# Patient Record
Sex: MALE | Race: WHITE | NOT HISPANIC OR LATINO | Employment: FULL TIME
[De-identification: names, ages, dates, MRNs, and addresses within clinical notes are randomized per-mention and may not be internally consistent; named-entity substitution may affect disease eponyms.]

---

## 2017-08-12 ENCOUNTER — HEALTH MAINTENANCE LETTER (OUTPATIENT)
Age: 52
End: 2017-08-12

## 2017-09-05 ENCOUNTER — HOSPITAL ENCOUNTER (EMERGENCY)
Facility: CLINIC | Age: 52
Discharge: HOME | End: 2017-09-05
Attending: PHYSICIAN ASSISTANT | Admitting: PHYSICIAN ASSISTANT
Payer: COMMERCIAL

## 2017-09-05 VITALS — HEART RATE: 103 BPM | DIASTOLIC BLOOD PRESSURE: 95 MMHG | TEMPERATURE: 98.8 F | SYSTOLIC BLOOD PRESSURE: 137 MMHG

## 2017-09-05 DIAGNOSIS — R50.9 FEVER, UNSPECIFIED: ICD-10-CM

## 2017-09-05 DIAGNOSIS — K30 UPSET STOMACH: ICD-10-CM

## 2017-09-05 DIAGNOSIS — L98.9 SKIN LESION OF RIGHT LEG: ICD-10-CM

## 2017-09-05 LAB
INTERNAL QC OK POCT: YES
S PYO AG THROAT QL IA.RAPID: NEGATIVE

## 2017-09-05 PROCEDURE — 99213 OFFICE O/P EST LOW 20 MIN: CPT | Performed by: PHYSICIAN ASSISTANT

## 2017-09-05 PROCEDURE — 87880 STREP A ASSAY W/OPTIC: CPT | Performed by: PHYSICIAN ASSISTANT

## 2017-09-05 PROCEDURE — 87081 CULTURE SCREEN ONLY: CPT | Performed by: PHYSICIAN ASSISTANT

## 2017-09-05 PROCEDURE — 99213 OFFICE O/P EST LOW 20 MIN: CPT

## 2017-09-05 NOTE — ED AVS SNAPSHOT
[facility] Emergency Department    [address] [address]    Phone:  [phone]    Fax:  [phone]                                       Oscar Greenberg   MRN: [id number]    Department:  [facility] Emergency Department   Date of Visit:  9/5/2017           Patient Information     Date Of Birth          1965        Your diagnoses for this visit were:     Fever, unspecified     Upset stomach     Skin lesion of right leg        You were seen by Yamilet Lu PA-C.      Follow-up Information     Follow up with Rafa Mccoy MD In 2 days.    Specialty:  Family Practice    Contact information:    [address] [id number] [phone]          Discharge Instructions       Patient declined Emergency Room evaluation of fever.     Increase fluids, rest, tylenol/ibuprofen over the counter as needed.     Return if fevers > 104F or not controlled with fever reducing medications, abdominal pain, vomiting, diarrhea, dehydration, worsening headaches, rash, redness, warmth, red streaking up leg, or worsening/changing of overall symptoms.     Patient agrees and will return if symptoms fail to improve. Risks were discussed with patient     24 Hour Appointment Hotline       To make an appointment at any [facility], call [phone] ([phone]). If you don't have a family doctor or clinic, we will help you find one. [place] clinics are conveniently located to serve the needs of you and your family.             Review of your medicines      Our records show that you are taking the medicines listed below. If these are incorrect, please call your family doctor or clinic.        Dose / Directions Last dose taken    IBUPROFEN PO   Dose:  400 mg        Take 400 mg by mouth   Refills:  0                Procedures and tests performed during your visit     Beta strep group A r/o culture    Rapid strep group A screen POCT      Orders Needing  Specimen Collection     None      Pending Results     No orders found from 9/3/2017 to 9/6/2017.            Pending Culture Results     No orders found from 9/3/2017 to 9/6/2017.            Pending Results Instructions     If you had any lab results that were not finalized at the time of your Discharge, you can call the ED Lab Result RN at [phone]. You will be contacted by this team for any positive Lab results or changes in treatment. The nurses are available 7 days a week from 10A to 6:30P.  You can leave a message 24 hours per day and they will return your call.        Test Results From Your Hospital Stay        9/5/2017  9:26 PM      Component Results     Component Value Ref Range & Units Status    Rapid Strep A Screen negative neg Final    Internal QC OK Yes  Final                Thank you for choosing [facility]       Thank you for choosing [facility] for your care. Our goal is always to provide you with excellent care. Hearing back from our patients is one way we can continue to improve our services. Please take a few minutes to complete the written survey that you may receive in the mail after you visit with us. Thank you!        [employer] Information     [employer] gives you secure access to your electronic health record. If you see a primary care provider, you can also send messages to your care team and make appointments. If you have questions, please call your primary care clinic.  If you do not have a primary care provider, please call [phone] and they will assist you.        Care EveryWhere ID     This is your Care EveryWhere ID. This could be used by other organizations to access your [facility] medical records  [id number]        Equal Access to Services     NEREIDA SALCIDO : Maria Harris, karlee patel, qaybchela kaaldavid parmar. So [facility] [phone].    ATENCIÓN: Si habla español, tiene a jones disposición servicios gratuitos de asistencia  maryl ou Gomeznette al [phone].    We comply with applicable federal civil rights laws and [place] laws. We do not discriminate on the basis of race, color, national origin, age, disability sex, sexual orientation or gender identity.            After Visit Summary       This is your record. Keep this with you and show to your community pharmacist(s) and doctor(s) at your next visit.

## 2017-09-05 NOTE — ED AVS SNAPSHOT
[facility] Emergency Department    [address] [address]    Phone:  [phone]    Fax:  [phone]                                       Oscar Greenberg   MRN: [id number]    Department:  [facility] Emergency Department   Date of Visit:  9/5/2017           After Visit Summary Signature Page     I have received my discharge instructions, and my questions have been answered. I have discussed any challenges I see with this plan with the nurse or doctor.    ..........................................................................................................................................  Patient/Patient Representative Signature      ..........................................................................................................................................  Patient Representative Print Name and Relationship to Patient    ..................................................               ................................................  Date                                            Time    ..........................................................................................................................................  Reviewed by Signature/Title    ...................................................              ..............................................  Date                                                            Time

## 2017-09-06 NOTE — ED PROVIDER NOTES
History     Chief Complaint   Patient presents with     Tick Removal     right calf     HPI  Oscar Greenberg is a 52 year old male who presents with concerns for tick to the right mid calf. Patient states he noticed spot to right calf about 6-7 days ago and he returned from [facility] 6 days ago. Patient denies known tick, redness, warmth, swelling, tenderness, or drainage from area. Positive pin point blood blister noted to spot. Patient states ever since he has been home from [place] he has noticed body aches, upset stomach, nausea, on and off headaches, and fever of 100F today after take 1000mg of acetaminophen 1 hr prior. Patient states he had sepsis with unknown source in the past and significant bowel issues and surgeries last year. Patient denies chest pain, cough, shortness of breath, abdominal pain, urinary symptoms. Patient states he is concerned about a blood infection.     I have reviewed the Medications, Allergies, Past Medical and Surgical History, and Social History in the Epic system.    Review of Systems     All normal unless stated above     Physical Exam   BP: (!) 143/111  Pulse: 103  Temp: 98.8  F (37.1  C)  Physical Exam   Constitutional: He is oriented to person, place, and time. He appears well-developed and well-nourished. No distress.   HENT:   Right Ear: External ear normal.   Left Ear: External ear normal.   Mouth/Throat: Mucous membranes are normal. Posterior oropharyngeal erythema present. No oropharyngeal exudate, posterior oropharyngeal edema or tonsillar abscesses.   Neck: Normal range of motion. Neck supple. No tracheal deviation present. No thyromegaly present.   Cardiovascular: Normal rate, normal heart sounds and intact distal pulses.    No murmur heard.  Pulmonary/Chest: Effort normal and breath sounds normal. No stridor.   Abdominal: Soft. Bowel sounds are normal. He exhibits no distension and no mass. There is tenderness (mild throughout). There is no rebound and no  guarding.   Musculoskeletal: Normal range of motion. He exhibits no edema, tenderness or deformity.   Lymphadenopathy:     He has cervical adenopathy.   Neurological: He is alert and oriented to person, place, and time.   Skin: Skin is warm and dry.        2 mm lesion to the right mid calf with central blood blister noted. No tenderness, warmth, erythema, drainage, or red streaking noted. No tick present.    Psychiatric: He has a normal mood and affect. His behavior is normal. Judgment and thought content normal.     Rst: negative in clinic.       ED Course     ED Course     Procedures            Critical Care time:  none               Labs Ordered and Resulted from Time of ED Arrival Up to the Time of Departure from the ED   RAPID STREP GROUP A SCREEN POCT   BETA STREP GROUP A CULTURE       Assessments & Plan (with Medical Decision Making)     I have reviewed the nursing notes.    I have reviewed the findings, diagnosis, plan and need for follow up with the patient.  Informed patient that lesion on right calf at this time does not appear infectious or related to other symptoms, but if lesion persists patient to follow up with Dermatology.   Discussed with patient that I don't think the lesion on the leg is related to the rest of his symptoms and with his history and recent travel to [place] recommendation is for him to have further evaluation and work up for fever, GI upset, and body aches in the Emergency Room. Discussed with Dr. Patterson who agreed. Patient was declined further work up in Emergency Room at this time and states he will return if symptoms persist/change/fail to improve. Patient informed of all risks and is aware and understands.        Discharge Medication List as of 9/5/2017  9:35 PM          Final diagnoses:   Fever, unspecified   Upset stomach   Skin lesion of right leg       9/5/2017   [facility] EMERGENCY DEPARTMENT     Yamilet Lu PA-C  09/05/17 [id number]

## 2017-09-06 NOTE — DISCHARGE INSTRUCTIONS
Patient declined Emergency Room evaluation of fever.     Increase fluids, rest, tylenol/ibuprofen over the counter as needed.     Return if fevers > 104F or not controlled with fever reducing medications, abdominal pain, vomiting, diarrhea, dehydration, worsening headaches, rash, redness, warmth, red streaking up leg, or worsening/changing of overall symptoms.     Patient agrees and will return if symptoms fail to improve. Risks were discussed with patient

## 2017-09-08 LAB
BACTERIA SPEC CULT: NORMAL
Lab: NORMAL
SPECIMEN SOURCE: NORMAL

## 2017-10-16 ENCOUNTER — ALLIED HEALTH/NURSE VISIT (OUTPATIENT)
Dept: FAMILY MEDICINE | Facility: CLINIC | Age: 52
End: 2017-10-16
Payer: COMMERCIAL

## 2017-10-16 DIAGNOSIS — Z23 NEED FOR PROPHYLACTIC VACCINATION AND INOCULATION AGAINST INFLUENZA: Primary | ICD-10-CM

## 2017-10-16 PROCEDURE — 90471 IMMUNIZATION ADMIN: CPT

## 2017-10-16 PROCEDURE — 90686 IIV4 VACC NO PRSV 0.5 ML IM: CPT

## 2017-10-16 PROCEDURE — 99207 ZZC NO CHARGE NURSE ONLY: CPT

## 2017-10-16 NOTE — MR AVS SNAPSHOT
After Visit Summary   10/16/2017    Oscar Greenberg    MRN: [id number]           Patient Information     Date Of Birth          1965        Visit Information        Provider Department      10/16/2017 8:30 AM [place] FP/IM CMA/LPN [facility]        Today's Diagnoses     Need for prophylactic vaccination and inoculation against influenza    -  1       Follow-ups after your visit        Who to contact     If you have questions or need follow up information about today's clinic visit or your schedule please contact [facility] directly at [phone].  Normal or non-critical lab and imaging results will be communicated to you by Probiodrughart, letter or phone within 4 business days after the clinic has received the results. If you do not hear from us within 7 days, please contact the clinic through iOmandot or phone. If you have a critical or abnormal lab result, we will notify you by phone as soon as possible.  Submit refill requests through [employer] or call your pharmacy and they will forward the refill request to us. Please allow 3 business days for your refill to be completed.          Additional Information About Your Visit        MyChart Information     [employer] gives you secure access to your electronic health record. If you see a primary care provider, you can also send messages to your care team and make appointments. If you have questions, please call your primary care clinic.  If you do not have a primary care provider, please call [phone] and they will assist you.        Care EveryWhere ID     This is your Care EveryWhere ID. This could be used by other organizations to access your [facility] medical records  [id number]         Blood Pressure from Last 3 Encounters:   09/05/17 (!) 137/95   08/09/16 125/86   07/20/16 128/81    Weight from Last 3 Encounters:   08/09/16 265 lb (120.2 kg)   07/20/16 259 lb 0.7 oz (117.5 kg)   07/15/16 259 lb (117.5 kg)              We  Performed the Following     FLU VAC, SPLIT VIRUS IM > 3 YO (QUADRIVALENT) [80140]     Vaccine Administration, Initial [[id number]]        Primary Care Provider Office Phone # Fax #    Rafa Mccoy [phone] [address] [address]        Equal Access to Services     NEREIDA SALCIDO : Hadii aad ku hadasho Soomaali, waaxda luqadaha, qaybta kaalmada adeegyada, david sesay . So [facility] [phone].    ATENCIÓN: Si habla español, tiene a jones disposición servicios gratuitos de asistencia lingüística. Llame al [phone].    We comply with applicable federal civil rights laws and [place] laws. We do not discriminate on the basis of race, color, national origin, age, disability, sex, sexual orientation, or gender identity.            Thank you!     Thank you for choosing [facility]  for your care. Our goal is always to provide you with excellent care. Hearing back from our patients is one way we can continue to improve our services. Please take a few minutes to complete the written survey that you may receive in the mail after your visit with us. Thank you!             Your Updated Medication List - Protect others around you: Learn how to safely use, store and throw away your medicines at www.disposemymeds.org.          This list is accurate as of: 10/16/17  8:37 AM.  Always use your most recent med list.                   Brand Name Dispense Instructions for use Diagnosis    IBUPROFEN PO      Take 400 mg by mouth

## 2017-10-16 NOTE — PROGRESS NOTES
Injectable Influenza Immunization Documentation    1.  Is the person to be vaccinated sick today?   No    2. Does the person to be vaccinated have an allergy to a component   of the vaccine?   No    3. Has the person to be vaccinated ever had a serious reaction   to influenza vaccine in the past?   No    4. Has the person to be vaccinated ever had Guillain-Barré syndrome?   No    Form completed by Milena Gomez CMA  Per orders of Dr. Gifford, injection of flu vaccine given by Milena Gomez. Patient instructed to remain in clinic for 15 minutes afterwards, and to report any adverse reaction to me immediately.

## 2018-10-16 ENCOUNTER — TELEPHONE (OUTPATIENT)
Dept: FAMILY MEDICINE | Facility: CLINIC | Age: 53
End: 2018-10-16

## 2018-10-16 NOTE — TELEPHONE ENCOUNTER
Triaging patient for appointment on 10/18/18 for rectal bleeding  Patient reports:  6 years ago he had a colonoscopy and had non cancerous polyps removed  For the last several months, about a day each month, he has bright red blood after having a normal soft bowel movement when wiping, no clots or active bleeding present  No changes in diet  He believes his mother has crohn's disease  He has not seen gastroenterology  He has noticed increase in urgency to have a bowel movement over the last several months.  Bowel movement 8-10 times a day, plans days around being around bathroom.  Appointment with provider 10/18/18 to have provider evaluated these symptoms  Patient advised to keep appointment with provider    Dixie ALEMAN Rn

## 2018-10-16 NOTE — TELEPHONE ENCOUNTER
Received information from scheduling staff he has made an appt for later this week for rectal bleeding.     Attempted to contact patient to triage. Left message for patient to return call  ERYN WilsonN, RN

## 2018-10-18 ENCOUNTER — HOSPITAL ENCOUNTER (OUTPATIENT)
Facility: CLINIC | Age: 53
End: 2018-10-18
Attending: SURGERY | Admitting: SURGERY
Payer: COMMERCIAL

## 2018-10-18 ENCOUNTER — OFFICE VISIT (OUTPATIENT)
Dept: FAMILY MEDICINE | Facility: CLINIC | Age: 53
End: 2018-10-18
Payer: COMMERCIAL

## 2018-10-18 VITALS
RESPIRATION RATE: 14 BRPM | HEIGHT: 74 IN | WEIGHT: 283.8 LBS | DIASTOLIC BLOOD PRESSURE: 84 MMHG | BODY MASS INDEX: 36.42 KG/M2 | TEMPERATURE: 98.4 F | SYSTOLIC BLOOD PRESSURE: 130 MMHG | HEART RATE: 88 BPM | OXYGEN SATURATION: 94 %

## 2018-10-18 DIAGNOSIS — Z23 NEED FOR TDAP VACCINATION: ICD-10-CM

## 2018-10-18 DIAGNOSIS — Z12.11 SCREENING FOR MALIGNANT NEOPLASM OF COLON: ICD-10-CM

## 2018-10-18 DIAGNOSIS — R58 BLOOD ON TOILET PAPER: Primary | ICD-10-CM

## 2018-10-18 DIAGNOSIS — Z23 NEED FOR PROPHYLACTIC VACCINATION AND INOCULATION AGAINST INFLUENZA: ICD-10-CM

## 2018-10-18 PROCEDURE — 90472 IMMUNIZATION ADMIN EACH ADD: CPT | Performed by: FAMILY MEDICINE

## 2018-10-18 PROCEDURE — 90471 IMMUNIZATION ADMIN: CPT | Performed by: FAMILY MEDICINE

## 2018-10-18 PROCEDURE — 90715 TDAP VACCINE 7 YRS/> IM: CPT | Performed by: FAMILY MEDICINE

## 2018-10-18 PROCEDURE — 90682 RIV4 VACC RECOMBINANT DNA IM: CPT | Performed by: FAMILY MEDICINE

## 2018-10-18 PROCEDURE — 99213 OFFICE O/P EST LOW 20 MIN: CPT | Mod: 25 | Performed by: FAMILY MEDICINE

## 2018-10-18 RX ORDER — NICOTINE POLACRILEX 4 MG/1
20 GUM, CHEWING ORAL DAILY PRN
COMMUNITY
End: 2021-03-24 | Stop reason: DRUGHIGH

## 2018-10-18 NOTE — MR AVS SNAPSHOT
After Visit Summary   10/18/2018    Oscar Greenberg    MRN: [id number]           Patient Information     Date Of Birth          1965        Visit Information        Provider Department      10/18/2018 8:20 AM Rafa Mccoy MD [facility]        Today's Diagnoses     Blood on toilet paper    -  1    Screening for malignant neoplasm of colon        Need for prophylactic vaccination and inoculation against influenza        Need for Tdap vaccination          Care Instructions    Schedule colonoscopy for screening for colon cancer at your soonest convenience.    Your exam today did not identify source of bleeding.    If you have briana heavy bleeding or with dizziness or severe pain, see provider promptly.      Thank you for choosing [facility].  You may be receiving a survey in the mail from [employer] regarding your visit today.  Please take a few minutes to complete and return the survey to let us know how we are doing.      If you have questions or concerns, please contact us via [employer] or you can contact your care team at [phone].    Our Clinic hours are:  Monday 6:40 am  to 7:00 pm  Tuesday -Friday 6:40 am to 5:00 pm    The [facility] outpatient lab hours are:  Monday - Friday 6:10 am to 4:45 pm  Saturdays 7:00 am to 11:00 am  Appointments are required, call [phone]    If you have clinical questions after hours or would like to schedule an appointment,  call the clinic at [phone].    Lower GI Bleeding (Stable)  You have signs of blood in your stool. This is called rectal bleeding. The bleeding may have begun in another part of your gastrointestinal (GI) tract. If the blood is bright red, it is likely coming from the lower part of the GI tract. If the blood is black or dark, it might be coming from higher up in the GI tract. Very small amounts of GI bleeding may not be visible and can only be discovered during a test on your stool. Possible causes of  lower GI bleeding include:    Hemorrhoids    Anal fissures    Diverticulitis    Inflammatory bowel disease (Crohn's disease or ulcerative colitis)    Polyps (growths) in the intestine  Note: Iron supplements and medicines for diarrhea or upset stomach can cause black stools. Foods such as licorice and red beets can also discolor the stool and be mistaken for bleeding. These are not bleeding and are not a cause for alarm.  Home care  You have not lost a large amount of blood and your condition appears stable at this time. You may resume normal activity as long as you feel well.  Don't take NSAIDs, such as aspirin, ibuprofen, or naproxen. They can irritate the stomach and cause further bleeding. If you are taking these medicines for other medical reasons, talk to your healthcare provider before you stop them.   Follow-up care  Follow up with your healthcare provider as advised. Further tests may be needed to find the cause of your bleeding.  When to seek medical advice  Call your healthcare provider right away for any of the following:    Large amount of rectal bleeding     Increasing abdominal pain    Weakness, dizziness  Call 911  Call 911 if any of the following occur:    Loss of consciousness    Vomiting blood  Date Last Reviewed: 6/24/2015 2000-2017 [employer]. [address] [place] [address]. All rights reserved. This information is not intended as a substitute for professional medical care. Always follow your healthcare professional's instructions.                Follow-ups after your visit        Additional Services     GASTROENTEROLOGY ADULT REF PROCEDURE ONLY [facility] [phone]; [place] [facility] Surgeon       Last Lab Result: Creatinine (mg/dL)       Date                     Value                 08/09/2016               0.62 (L)         ----------  Body mass index is 36.19 kg/(m^2).     Needed:  No  Language:  English    Patient will be contacted to schedule  procedure.     Please be aware that coverage of these services is subject to the terms and limitations of your health insurance plan.  Call member services at your health plan with any benefit or coverage questions.  Any procedures must be performed at a [facility] facility OR coordinated by your clinic's referral office.    Please bring the following with you to your appointment:    (1) Any X-Rays, CTs or MRIs which have been performed.  Contact the facility where they were done to arrange for  prior to your scheduled appointment.    (2) List of current medications   (3) This referral request   (4) Any documents/labs given to you for this referral                  Who to contact     If you have questions or need follow up information about today's clinic visit or your schedule please contact [facility] directly at [phone].  Normal or non-critical lab and imaging results will be communicated to you by MyChart, letter or phone within 4 business days after the clinic has received the results. If you do not hear from us within 7 days, please contact the clinic through MyChart or phone. If you have a critical or abnormal lab result, we will notify you by phone as soon as possible.  Submit refill requests through [employer] or call your pharmacy and they will forward the refill request to us. Please allow 3 business days for your refill to be completed.          Additional Information About Your Visit        [employer] Information     [employer] gives you secure access to your electronic health record. If you see a primary care provider, you can also send messages to your care team and make appointments. If you have questions, please call your primary care clinic.  If you do not have a primary care provider, please call [phone] and they will assist you.        Care EveryWhere ID     This is your Care EveryWhere ID. This could be used by other organizations to access your [facility]  "records  [id number]        Your Vitals Were     Pulse Temperature Respirations Height Pulse Oximetry BMI (Body Mass Index)    88 98.4  F (36.9  C) (Tympanic) 14 6' 2.25\" (1.886 m) 94% 36.19 kg/m2       Blood Pressure from Last 3 Encounters:   10/18/18 130/84   09/05/17 (!) 137/95   08/09/16 125/86    Weight from Last 3 Encounters:   10/18/18 283 lb 12.8 oz (128.7 kg)   08/09/16 265 lb (120.2 kg)   07/20/16 259 lb 0.7 oz (117.5 kg)              We Performed the Following     EA ADD'L VACCINE     FLU VACCINE, (RIV4) RECOMBINANT HA  , IM (FluBlok, egg free) [26055]- >18 YRS (FMG recommended  50-64 YRS)     GASTROENTEROLOGY ADULT REF PROCEDURE ONLY [facility] [phone]; [place] [facility] Surgeon     TDAP, IM (10 - 64 YRS) - Adacel     Vaccine Administration, Initial [44782]        Primary Care Provider Office Phone # Fax #    Rafa Mccoy [phone] [address] [facility] [address]        Equal Access to Services     NEREIDA SALCIDO AH: Hadii amita garay hadasho Soomaali, waaxda luqadaha, qaybta kaalmada adeegyada, david caba. So [facility] [phone].    ATENCIÓN: Si habla español, tiene a jones disposición servicios gratuitos de asistencia lingüística. Llame al [phone].    We comply with applicable federal civil rights laws and [place] laws. We do not discriminate on the basis of race, color, national origin, age, disability, sex, sexual orientation, or gender identity.            Thank you!     Thank you for choosing [facility]  for your care. Our goal is always to provide you with excellent care. Hearing back from our patients is one way we can continue to improve our services. Please take a few minutes to complete the written survey that you may receive in the mail after your visit with us. Thank you!             Your Updated Medication List - Protect others around you: Learn how to safely use, store and throw away " your medicines at www.disposemymeds.org.          This list is accurate as of 10/18/18  9:05 AM.  Always use your most recent med list.                   Brand Name Dispense Instructions for use Diagnosis    IBUPROFEN PO      Take 400 mg by mouth every 8 hours as needed When needed        omeprazole 20 MG tablet      Take 20 mg by mouth daily as needed

## 2018-10-18 NOTE — PATIENT INSTRUCTIONS
Schedule colonoscopy for screening for colon cancer at your soonest convenience.    Your exam today did not identify source of bleeding.    If you have briana heavy bleeding or with dizziness or severe pain, see provider promptly.      Thank you for choosing [facility].  You may be receiving a survey in the mail from Sushma Cowan regarding your visit today.  Please take a few minutes to complete and return the survey to let us know how we are doing.      If you have questions or concerns, please contact us via [employer] or you can contact your care team at [phone].    Our Clinic hours are:  Monday 6:40 am  to 7:00 pm  Tuesday -Friday 6:40 am to 5:00 pm    The [place] outpatient lab hours are:  Monday - Friday 6:10 am to 4:45 pm  Saturdays 7:00 am to 11:00 am  Appointments are required, call [phone]    If you have clinical questions after hours or would like to schedule an appointment,  call the clinic at [phone].    Lower GI Bleeding (Stable)  You have signs of blood in your stool. This is called rectal bleeding. The bleeding may have begun in another part of your gastrointestinal (GI) tract. If the blood is bright red, it is likely coming from the lower part of the GI tract. If the blood is black or dark, it might be coming from higher up in the GI tract. Very small amounts of GI bleeding may not be visible and can only be discovered during a test on your stool. Possible causes of lower GI bleeding include:    Hemorrhoids    Anal fissures    Diverticulitis    Inflammatory bowel disease (Crohn's disease or ulcerative colitis)    Polyps (growths) in the intestine  Note: Iron supplements and medicines for diarrhea or upset stomach can cause black stools. Foods such as licorice and red beets can also discolor the stool and be mistaken for bleeding. These are not bleeding and are not a cause for alarm.  Home care  You have not lost a large amount of blood and your condition appears stable at this  time. You may resume normal activity as long as you feel well.  Don't take NSAIDs, such as aspirin, ibuprofen, or naproxen. They can irritate the stomach and cause further bleeding. If you are taking these medicines for other medical reasons, talk to your healthcare provider before you stop them.   Follow-up care  Follow up with your healthcare provider as advised. Further tests may be needed to find the cause of your bleeding.  When to seek medical advice  Call your healthcare provider right away for any of the following:    Large amount of rectal bleeding     Increasing abdominal pain    Weakness, dizziness  Call 911  Call 911 if any of the following occur:    Loss of consciousness    Vomiting blood  Date Last Reviewed: 6/24/2015 2000-2017 [employer]. [address] [place] [address], [place] [address]. All rights reserved. This information is not intended as a substitute for professional medical care. Always follow your healthcare professional's instructions.

## 2018-10-18 NOTE — PROGRESS NOTES
"  SUBJECTIVE:   Oscar Greenberg is a 53 year old male who presents to clinic today for the following health issues:  Chief Complaint   Patient presents with     Rectal Bleeding     Flu Shot     Imm/Inj     Tdap- will do today      Health Maintenance     Due for colonoscopy - last one 2007- had polyps          Concern - Rectal Bleeding   Onset: 6 months ago , did have previously 11 yrs ago as well     Description:   Sporadic blood after having a B.M he notices it on the tissue, Bright Red, Thin    Intensity: mild    Progression of Symptoms:  Intermittent - occurs about once a month, about 1-2 days.    Accompanying Signs & Symptoms:  Patient states he notices it more with \"looser bowel movements\".    Previous history of similar problem:   Yes 2011    Precipitating factors:   Worsened by: none     Alleviating factors:  Improved by: none     Therapies Tried and outcome: none     Verified above history with patient.      Problem list and histories reviewed & adjusted, as indicated.  Additional history: as documented    Patient Active Problem List   Diagnosis     ALCOH USE     Contact dermatitis and other eczema, due to unspecified cause     Nonspecific elevation of levels of transaminase or lactic acid dehydrogenase (LDH)     GERD (gastroesophageal reflux disease)     Saphenous vein thrombophlebitis     CARDIOVASCULAR SCREENING; LDL GOAL LESS THAN 160     Screening for hypertension     Alcoholic ketoacidosis     Abdominal pain     Bacteremia     Past Surgical History:   Procedure Laterality Date     ENDOSCOPIC RETROGRADE CHOLANGIOPANCREATOGRAM N/A 6/7/2016    Procedure: COMBINED ENDOSCOPIC RETROGRADE CHOLANGIOPANCREATOGRAPHY, PLACE TUBE/STENT;  Surgeon: Stephane Hunter MD;  Location: [facility] OR     LAPAROSCOPIC CHOLECYSTECTOMY WITH CHOLANGIOGRAMS N/A 6/2/2016    Procedure: LAPAROSCOPIC CHOLECYSTECTOMY WITH CHOLANGIOGRAMS;  Surgeon: Darian Mcclain MD;  Location: [facility] OR     NO HISTORY OF SURGERY         Social History " "  Substance Use Topics     Smoking status: Never Smoker     Smokeless tobacco: Never Used     Alcohol use Yes      Comment: occ.      Family History   Problem Relation Age of Onset     Unknown/Adopted Maternal Grandmother      C.A.D. Maternal Grandfather      Unknown/Adopted Paternal Grandmother      Cancer Paternal Grandfather          Current Outpatient Prescriptions   Medication Sig Dispense Refill     IBUPROFEN PO Take 400 mg by mouth every 8 hours as needed When needed       omeprazole 20 MG tablet Take 20 mg by mouth daily as needed       Allergies   Allergen Reactions     Nka [No Known Allergies]        Reviewed and updated as needed this visit by clinical staff  Tobacco  Allergies  Meds  Problems  Med Hx  Surg Hx  Fam Hx  Soc Hx        Reviewed and updated as needed this visit by Provider  Allergies  Meds  Problems         ROS:  ROS:  C: NEGATIVE for fever, chills or change in weight  I: NEGATIVE for rash or pallor  E: NEGATIVE for jaundice  R: NEGATIVE for exertional symptoms  CV: NEGATIVE for palpitations or lightheadedness  GI: see above  N: NEG for dizziness  H: NEGATIVE for bleeding problems    OBJECTIVE:     /84  Pulse 88  Temp 98.4  F (36.9  C) (Tympanic)  Resp 14  Ht 6' 2.25\" (1.886 m)  Wt 283 lb 12.8 oz (128.7 kg)  SpO2 94%  BMI 36.19 kg/m2  Body mass index is 36.19 kg/(m^2).   GEN: alert, oriented x 3, NAD  EYES: pink conjunctivae  EXT ANAL EXAM: no thrombosed hemorrhoid; no active bleeding, no visible mass, no fissure  ANOSCOPY: deferred  DIRECT RECTAL EXAM: good sphincter tone, intact vault, no mass to reach of exam finger, no blood per exam glove.    Diagnostic Test Results:  none     ASSESSMENT/PLAN:     Problem List Items Addressed This Visit     None      Visit Diagnoses     Blood on toilet paper    -  Primary    Screening for malignant neoplasm of colon        Relevant Orders    GASTROENTEROLOGY ADULT REF PROCEDURE ONLY [facility] [phone]; [facility] " Surgeon    Need for prophylactic vaccination and inoculation against influenza        Relevant Orders    FLU VACCINE, (RIV4) RECOMBINANT HA  , IM (FluBlok, egg free) [95159]- >18 YRS (FMG recommended  50-64 YRS) (Completed)    Vaccine Administration, Initial [90141] (Completed)    Need for Tdap vaccination        Relevant Orders    TDAP, IM (10 - 64 YRS) - Adacel (Completed)    EA ADD'L VACCINE (Completed)         DDx: hemorrhoids, polyps, diverticulosis, angiodysplasias, ulcers, other masses  Since recurrent and chronic, colonoscopy is ordered to look for occult conditions and to screen for colorectal cancer (pt is due)  Bleeding precautions given to patient.  High fiber diet discussed.  Further recommendations after colonoscopy.    FURTHER TESTING:       - colonoscopy  Patient Instructions   Schedule colonoscopy for screening for colon cancer at your soonest convenience.    Your exam today did not identify source of bleeding.    If you have briana heavy bleeding or with dizziness or severe pain, see provider promptly.      Thank you for choosing [facility].  You may be receiving a survey in the mail from [employer] regarding your visit today.  Please take a few minutes to complete and return the survey to let us know how we are doing.      If you have questions or concerns, please contact us via [employer] or you can contact your care team at [phone].    Our Clinic hours are:  Monday 6:40 am  to 7:00 pm  Tuesday -Friday 6:40 am to 5:00 pm    The [place] outpatient lab hours are:  Monday - Friday 6:10 am to 4:45 pm  Saturdays 7:00 am to 11:00 am  Appointments are required, call [phone]    If you have clinical questions after hours or would like to schedule an appointment,  call the clinic at [phone].    Lower GI Bleeding (Stable)  You have signs of blood in your stool. This is called rectal bleeding. The bleeding may have begun in another part of your gastrointestinal (GI) tract. If the blood is  bright red, it is likely coming from the lower part of the GI tract. If the blood is black or dark, it might be coming from higher up in the GI tract. Very small amounts of GI bleeding may not be visible and can only be discovered during a test on your stool. Possible causes of lower GI bleeding include:    Hemorrhoids    Anal fissures    Diverticulitis    Inflammatory bowel disease (Crohn's disease or ulcerative colitis)    Polyps (growths) in the intestine  Note: Iron supplements and medicines for diarrhea or upset stomach can cause black stools. Foods such as licorice and red beets can also discolor the stool and be mistaken for bleeding. These are not bleeding and are not a cause for alarm.  Home care  You have not lost a large amount of blood and your condition appears stable at this time. You may resume normal activity as long as you feel well.  Don't take NSAIDs, such as aspirin, ibuprofen, or naproxen. They can irritate the stomach and cause further bleeding. If you are taking these medicines for other medical reasons, talk to your healthcare provider before you stop them.   Follow-up care  Follow up with your healthcare provider as advised. Further tests may be needed to find the cause of your bleeding.  When to seek medical advice  Call your healthcare provider right away for any of the following:    Large amount of rectal bleeding     Increasing abdominal pain    Weakness, dizziness  Call 911  Call 911 if any of the following occur:    Loss of consciousness    Vomiting blood  Date Last Reviewed: 6/24/2015 2000-2017 [employer]. [address] [place] [address]. All rights reserved. This information is not intended as a substitute for professional medical care. Always follow your healthcare professional's instructions.            Rafa Mccoy MD  [facility]    Injectable Influenza Immunization Documentation    1.  Is the person to be vaccinated sick today?    No    2. Does the person to be vaccinated have an allergy to a component   of the vaccine?   No  Egg Allergy Algorithm Link    3. Has the person to be vaccinated ever had a serious reaction   to influenza vaccine in the past?   No    4. Has the person to be vaccinated ever had Guillain-Barré syndrome?   No    Form completed by Tono ALEMAN CMA

## 2018-10-18 NOTE — NURSING NOTE
Screening Questionnaire for Adult Immunization    Are you sick today?   No   Do you have allergies to medications, food, a vaccine component or latex?   No   Have you ever had a serious reaction after receiving a vaccination?   No   Do you have a long-term health problem with heart disease, lung disease, asthma, kidney disease, metabolic disease (e.g. diabetes), anemia, or other blood disorder?   No   Do you have cancer, leukemia, HIV/AIDS, or any other immune system problem?   No   In the past 3 months, have you taken medications that affect  your immune system, such as prednisone, other steroids, or anticancer drugs; drugs for the treatment of rheumatoid arthritis, Crohn s disease, or psoriasis; or have you had radiation treatments?   No   Have you had a seizure, or a brain or other nervous system problem?   No   During the past year, have you received a transfusion of blood or blood     products, or been given immune (gamma) globulin or antiviral drug?   No   For women: Are you pregnant or is there a chance you could become        pregnant during the next month?   No   Have you received any vaccinations in the past 4 weeks?   No     Immunization questionnaire answers were all negative.      Patient instructed to remain in clinic for 15 minutes afterwards, and to report any adverse reaction to me immediately.       Screening performed by Tono Schaeffer on 10/18/2018 at 8:48 AM.

## 2019-02-06 ENCOUNTER — ANESTHESIA EVENT (OUTPATIENT)
Dept: GASTROENTEROLOGY | Facility: CLINIC | Age: 54
End: 2019-02-06
Payer: COMMERCIAL

## 2019-02-06 NOTE — ANESTHESIA PREPROCEDURE EVALUATION
Anesthesia Pre-Procedure Evaluation    Patient: Oscar Greenberg   MRN: [id number] : 1965          Preoperative Diagnosis: screening    Procedure(s):  COLONOSCOPY    Past Medical History:   Diagnosis Date     Alcohol abuse      Past Surgical History:   Procedure Laterality Date     ENDOSCOPIC RETROGRADE CHOLANGIOPANCREATOGRAM N/A 2016    Procedure: COMBINED ENDOSCOPIC RETROGRADE CHOLANGIOPANCREATOGRAPHY, PLACE TUBE/STENT;  Surgeon: Stephane Hunter MD;  Location: [facility] OR     LAPAROSCOPIC CHOLECYSTECTOMY WITH CHOLANGIOGRAMS N/A 2016    Procedure: LAPAROSCOPIC CHOLECYSTECTOMY WITH CHOLANGIOGRAMS;  Surgeon: Darian Mcclain MD;  Location: [facility] OR     NO HISTORY OF SURGERY         Anesthesia Evaluation     . Pt has had prior anesthetic. Type: General and MAC    No history of anesthetic complications          ROS/MED HX    ENT/Pulmonary:  - neg pulmonary ROS   (+)SILVIA risk factors obese, , . .    Neurologic:  - neg neurologic ROS     Cardiovascular: Comment: Saphenous vein phrombophlebitis    (+) Dyslipidemia, ----. : . . . :. . Previous cardiac testing Echodate:16results:Interpretation Summary  - There are no vegetations or regurgitant valve lesions to suggest  endocarditis.  - Otherwise unremarkable study (see details below).  ______________________________________________________________________________        Procedure  Transesophageal Echocardiogram with color and spectral Doppler performed. The  procedure was performed in the Echo Lab. Informed consent for Transesophegeal  echo obtained. MARTA Probe #3 was used during the procedure. Patient was  sedated using Fentanyl 100 mcg. Patient was sedated using Versed 6 mg. The  Transducer was inserted without difficulty . The patient tolerated the  procedure well. Complications None.     Left Ventricle  Left ventricular diastolic function cannot be assessed. Global and regional  left ventricular function is normal with an EF of 55-60%. Left  ventricular  size is normal. Left ventricular wall thickness is normal.     Right Ventricle  Right ventricular function, chamber size, wall motion, and thickness are  normal.     Atria  The left atrial appendage is normal. It is free of spontaneous echo contrast  and thrombus. Both atria appear normal. The atrial septum is intact as  assessed by agitated saline bubble study .     Mitral Valve  The mitral valve is normal. No vegetations seen. Mild mitral insufficiency is  present.     Aortic Valve  There is very mild prolapse of either the left or non coronary cusp. No  vegetations seen. Trace aortic insufficiency is present.  Tricuspid Valve  The tricuspid valve is normal. No vegetations seen. Trace to mild tricuspid  insufficiency is present.     Pulmonic Valve  The pulmonic valve is normal. No vegetations seen.     Vessels  The aorta root is normal. The thoracic aorta is normal. The pulmonary artery  cannot be assessed.     Pericardium  No pericardial effusion is present.date: results:ECG reviewed date:5/16/16 results:Sinus Tachycardia   WITHIN NORMAL LIMITS date: results:          METS/Exercise Tolerance:     Hematologic:  - neg hematologic  ROS       Musculoskeletal:  - neg musculoskeletal ROS       GI/Hepatic:     (+) GERD liver disease, Other GI/Hepatic ETOH abuse; Alcoholic ketoacidosis      Renal/Genitourinary:  - ROS Renal section negative       Endo:  - neg endo ROS       Psychiatric:  - neg psychiatric ROS       Infectious Disease:  - neg infectious disease ROS       Malignancy:      - no malignancy   Other:    - neg other ROS                      Physical Exam  Normal systems: cardiovascular, pulmonary and dental    Airway   Mallampati: I  TM distance: >3 FB  Neck ROM: full    Dental     Cardiovascular       Pulmonary             Lab Results   Component Value Date    WBC 9.7 06/21/2016    HGB 10.7 (L) 06/21/2016    HCT 32.8 (L) 06/21/2016     06/21/2016    .0 (H) 05/16/2016      "06/22/2016    POTASSIUM 3.1 (L) 06/22/2016    CHLORIDE 102 06/22/2016    CO2 25 06/22/2016    BUN 12 06/22/2016    CR 0.62 (L) 08/09/2016     (H) 06/22/2016    BRIAN 8.2 (L) 06/22/2016    MAG 1.6 06/21/2016    ALBUMIN 2.1 (L) 06/21/2016    PROTTOTAL 6.5 (L) 06/21/2016    ALT 12 06/21/2016    AST 12 06/21/2016    GGT 1,135 (H) 05/30/2016    ALKPHOS 52 06/21/2016    BILITOTAL 0.7 06/21/2016    LIPASE 90 06/20/2016    AMYLASE 21 (L) 06/07/2016    MARIAA 21 05/17/2016    PTT 34 05/20/2016    INR 1.55 (H) 06/20/2016       Preop Vitals  BP Readings from Last 3 Encounters:   10/18/18 130/84   09/05/17 (!) 137/95   08/09/16 125/86    Pulse Readings from Last 3 Encounters:   10/18/18 88   09/05/17 103   07/20/16 89      Resp Readings from Last 3 Encounters:   10/18/18 14   08/09/16 18   07/20/16 14    SpO2 Readings from Last 3 Encounters:   10/18/18 94%   08/09/16 96%   07/20/16 97%      Temp Readings from Last 1 Encounters:   10/18/18 36.9  C (98.4  F) (Tympanic)    Ht Readings from Last 1 Encounters:   10/18/18 1.886 m (6' 2.25\")      Wt Readings from Last 1 Encounters:   10/18/18 128.7 kg (283 lb 12.8 oz)    Estimated body mass index is 36.19 kg/m  as calculated from the following:    Height as of 10/18/18: 1.886 m (6' 2.25\").    Weight as of 10/18/18: 128.7 kg (283 lb 12.8 oz).       Anesthesia Plan      History & Physical Review  History and physical reviewed and following examination; no interval change.    ASA Status:  2 .    NPO Status:  > 4 hours    Plan for MAC with Propofol induction. Reason for MAC:  Deep or markedly invasive procedure (G8)         Postoperative Care      Consents  Anesthetic plan, risks, benefits and alternatives discussed with:  Patient..                 LATRICIA Bosch CRNA  "

## 2019-02-08 ENCOUNTER — ANESTHESIA (OUTPATIENT)
Dept: GASTROENTEROLOGY | Facility: CLINIC | Age: 54
End: 2019-02-08
Payer: COMMERCIAL

## 2019-02-08 ENCOUNTER — HOSPITAL ENCOUNTER (OUTPATIENT)
Facility: CLINIC | Age: 54
Discharge: HOME | End: 2019-02-08
Attending: SURGERY | Admitting: SURGERY
Payer: COMMERCIAL

## 2019-02-08 VITALS
DIASTOLIC BLOOD PRESSURE: 99 MMHG | BODY MASS INDEX: 35.96 KG/M2 | OXYGEN SATURATION: 97 % | WEIGHT: 282 LBS | HEART RATE: 88 BPM | RESPIRATION RATE: 15 BRPM | SYSTOLIC BLOOD PRESSURE: 127 MMHG | TEMPERATURE: 98.4 F

## 2019-02-08 LAB — COLONOSCOPY: NORMAL

## 2019-02-08 PROCEDURE — 25000125 ZZHC RX 250: Performed by: NURSE ANESTHETIST, CERTIFIED REGISTERED

## 2019-02-08 PROCEDURE — 37000008 ZZH ANESTHESIA TECHNICAL FEE, 1ST 30 MIN: Performed by: SURGERY

## 2019-02-08 PROCEDURE — 25000125 ZZHC RX 250: Performed by: SURGERY

## 2019-02-08 PROCEDURE — 45380 COLONOSCOPY AND BIOPSY: CPT | Mod: 59 | Performed by: SURGERY

## 2019-02-08 PROCEDURE — 88305 TISSUE EXAM BY PATHOLOGIST: CPT | Mod: 26 | Performed by: SURGERY

## 2019-02-08 PROCEDURE — 45384 COLONOSCOPY W/LESION REMOVAL: CPT | Mod: PT | Performed by: SURGERY

## 2019-02-08 PROCEDURE — 45380 COLONOSCOPY AND BIOPSY: CPT | Performed by: SURGERY

## 2019-02-08 PROCEDURE — 45385 COLONOSCOPY W/LESION REMOVAL: CPT | Performed by: SURGERY

## 2019-02-08 PROCEDURE — 88305 TISSUE EXAM BY PATHOLOGIST: CPT | Performed by: SURGERY

## 2019-02-08 PROCEDURE — 37000009 ZZH ANESTHESIA TECHNICAL FEE, EACH ADDTL 15 MIN: Performed by: SURGERY

## 2019-02-08 PROCEDURE — 25000128 H RX IP 250 OP 636: Performed by: NURSE ANESTHETIST, CERTIFIED REGISTERED

## 2019-02-08 PROCEDURE — 25000128 H RX IP 250 OP 636: Performed by: SURGERY

## 2019-02-08 RX ORDER — SODIUM CHLORIDE, SODIUM LACTATE, POTASSIUM CHLORIDE, CALCIUM CHLORIDE 600; 310; 30; 20 MG/100ML; MG/100ML; MG/100ML; MG/100ML
INJECTION, SOLUTION INTRAVENOUS CONTINUOUS
Status: DISCONTINUED | OUTPATIENT
Start: 2019-02-08 | End: 2019-02-08 | Stop reason: HOSPADM

## 2019-02-08 RX ORDER — PROPOFOL 10 MG/ML
INJECTION, EMULSION INTRAVENOUS CONTINUOUS PRN
Status: DISCONTINUED | OUTPATIENT
Start: 2019-02-08 | End: 2019-02-08

## 2019-02-08 RX ORDER — FLUMAZENIL 0.1 MG/ML
0.2 INJECTION, SOLUTION INTRAVENOUS
Status: CANCELLED | OUTPATIENT
Start: 2019-02-08 | End: 2019-02-08

## 2019-02-08 RX ORDER — ONDANSETRON 2 MG/ML
4 INJECTION INTRAMUSCULAR; INTRAVENOUS
Status: DISCONTINUED | OUTPATIENT
Start: 2019-02-08 | End: 2019-02-08 | Stop reason: HOSPADM

## 2019-02-08 RX ORDER — NALOXONE HYDROCHLORIDE 0.4 MG/ML
.1-.4 INJECTION, SOLUTION INTRAMUSCULAR; INTRAVENOUS; SUBCUTANEOUS
Status: CANCELLED | OUTPATIENT
Start: 2019-02-08 | End: 2019-02-09

## 2019-02-08 RX ORDER — PROPOFOL 10 MG/ML
INJECTION, EMULSION INTRAVENOUS PRN
Status: DISCONTINUED | OUTPATIENT
Start: 2019-02-08 | End: 2019-02-08

## 2019-02-08 RX ORDER — LIDOCAINE HYDROCHLORIDE 10 MG/ML
INJECTION, SOLUTION INFILTRATION; PERINEURAL PRN
Status: DISCONTINUED | OUTPATIENT
Start: 2019-02-08 | End: 2019-02-08

## 2019-02-08 RX ORDER — LIDOCAINE 40 MG/G
CREAM TOPICAL
Status: DISCONTINUED | OUTPATIENT
Start: 2019-02-08 | End: 2019-02-08 | Stop reason: HOSPADM

## 2019-02-08 RX ADMIN — PROPOFOL 50 MG: 10 INJECTION, EMULSION INTRAVENOUS at 11:07

## 2019-02-08 RX ADMIN — LIDOCAINE HYDROCHLORIDE 50 MG: 10 INJECTION, SOLUTION INFILTRATION; PERINEURAL at 11:04

## 2019-02-08 RX ADMIN — PROPOFOL 150 MCG/KG/MIN: 10 INJECTION, EMULSION INTRAVENOUS at 11:04

## 2019-02-08 RX ADMIN — LIDOCAINE HYDROCHLORIDE 1 ML: 10 INJECTION, SOLUTION EPIDURAL; INFILTRATION; INTRACAUDAL; PERINEURAL at 10:16

## 2019-02-08 RX ADMIN — PROPOFOL 50 MG: 10 INJECTION, EMULSION INTRAVENOUS at 11:22

## 2019-02-08 RX ADMIN — PROPOFOL 50 MG: 10 INJECTION, EMULSION INTRAVENOUS at 11:27

## 2019-02-08 RX ADMIN — PROPOFOL 100 MG: 10 INJECTION, EMULSION INTRAVENOUS at 11:04

## 2019-02-08 RX ADMIN — SODIUM CHLORIDE, POTASSIUM CHLORIDE, SODIUM LACTATE AND CALCIUM CHLORIDE: 600; 310; 30; 20 INJECTION, SOLUTION INTRAVENOUS at 10:15

## 2019-02-08 NOTE — ANESTHESIA POSTPROCEDURE EVALUATION
Patient: Oscar Greenberg    Procedure(s):  COMBINED COLONOSCOPY, SINGLE OR MULTIPLE BIOPSY/POLYPECTOMY BY BIOPSY  Combined Colonoscopy, Remove Tumor/Polyp/Lesion By Snare    Diagnosis:screening  Diagnosis Additional Information: No value filed.    Anesthesia Type:  MAC    Note:  Anesthesia Post Evaluation    Patient location during evaluation: Phase 2  Patient participation: Able to fully participate in evaluation  Level of consciousness: awake  Pain management: adequate  Airway patency: patent  Cardiovascular status: acceptable  Respiratory status: acceptable  Hydration status: acceptable  PONV: none             Last vitals:  Vitals:    02/08/19 0938 02/08/19 1006   Pulse:  103   Resp: 18 16   Temp: 36.9  C (98.4  F)    SpO2: 97%          Electronically Signed By: LATRICIA Edward CRNA  February 8, 2019  11:44 AM

## 2019-02-08 NOTE — H&P
53 year old year old male here for colonoscopy for personal history of polyps.  Last colonoscopy 10 years ago with [facility].  Occasionally has small amount of blood with stool and perianal mass.      Patient Active Problem List   Diagnosis     ALCOH USE     Contact dermatitis and other eczema, due to unspecified cause     Nonspecific elevation of levels of transaminase or lactic acid dehydrogenase (LDH)     GERD (gastroesophageal reflux disease)     Saphenous vein thrombophlebitis     CARDIOVASCULAR SCREENING; LDL GOAL LESS THAN 160     Screening for hypertension     Alcoholic ketoacidosis     Abdominal pain     Bacteremia       Past Medical History:   Diagnosis Date     Alcohol abuse        Past Surgical History:   Procedure Laterality Date     ENDOSCOPIC RETROGRADE CHOLANGIOPANCREATOGRAM N/A 6/7/2016    Procedure: COMBINED ENDOSCOPIC RETROGRADE CHOLANGIOPANCREATOGRAPHY, PLACE TUBE/STENT;  Surgeon: Stephane Hunter MD;  Location: [facility] OR     LAPAROSCOPIC CHOLECYSTECTOMY WITH CHOLANGIOGRAMS N/A 6/2/2016    Procedure: LAPAROSCOPIC CHOLECYSTECTOMY WITH CHOLANGIOGRAMS;  Surgeon: Darian Mcclain MD;  Location: [facility] OR     NO HISTORY OF SURGERY         Family History   Problem Relation Age of Onset     Unknown/Adopted Maternal Grandmother      C.A.D. Maternal Grandfather      Unknown/Adopted Paternal Grandmother      Cancer Paternal Grandfather      No current outpatient medications on file.       Allergies   Allergen Reactions     Nka [No Known Allergies]        Pt reports that  has never smoked. he has never used smokeless tobacco. He reports that he drinks alcohol. He reports that he does not use drugs.    Exam:  Pulse 103   Temp 98.4  F (36.9  C) (Oral)   Resp 16   Wt 127.9 kg (282 lb)   SpO2 97%   BMI 35.96 kg/m      Awake, Alert OX3  Lungs - CTA bilaterally  CV - RRR, no murmurs, distal pulses intact  Abd - soft, non-distended, non-tender, +BS  Extr - No cyanosis or edema    A/P 53 year old  year old male in need of colonoscopy for personal history of polyps. Risks, benefits, alternatives, and complications were discussed including the possibility of perforation, bleeding, missed lesion and the patient agreed to proceed.    Toño Escamilla, DO on 2/8/2019 at 10:56 AM

## 2019-02-08 NOTE — ANESTHESIA CARE TRANSFER NOTE
Patient: Oscar Greenberg    Procedure(s):  COMBINED COLONOSCOPY, SINGLE OR MULTIPLE BIOPSY/POLYPECTOMY BY BIOPSY  Combined Colonoscopy, Remove Tumor/Polyp/Lesion By Snare    Diagnosis: screening  Diagnosis Additional Information: No value filed.    Anesthesia Type:   MAC     Note:  Airway :Room Air  Patient transferred to:Phase II  Handoff Report: Identifed the Patient, Identified the Reponsible Provider, Reviewed the pertinent medical history, Discussed the surgical course, Reviewed Intra-OP anesthesia mangement and issues during anesthesia, Set expectations for post-procedure period and Allowed opportunity for questions and acknowledgement of understanding      Vitals: (Last set prior to Anesthesia Care Transfer)    CRNA VITALS  2/8/2019 1110 - 2/8/2019 1140      2/8/2019             Pulse:  80    SpO2:  91 %                Electronically Signed By: LATRICIA Edward CRNA  February 8, 2019  11:40 AM

## 2019-02-12 LAB — COPATH REPORT: NORMAL

## 2019-02-13 PROBLEM — K63.5 COLON POLYPS: Status: ACTIVE | Noted: 2019-02-13

## 2019-04-09 ENCOUNTER — OFFICE VISIT (OUTPATIENT)
Dept: VASCULAR SURGERY | Facility: CLINIC | Age: 54
End: 2019-04-09
Payer: COMMERCIAL

## 2019-04-09 VITALS — SYSTOLIC BLOOD PRESSURE: 135 MMHG | DIASTOLIC BLOOD PRESSURE: 92 MMHG | HEART RATE: 85 BPM | RESPIRATION RATE: 16 BRPM

## 2019-04-09 DIAGNOSIS — I87.2 VENOUS (PERIPHERAL) INSUFFICIENCY: Primary | ICD-10-CM

## 2019-04-09 PROCEDURE — 99203 OFFICE O/P NEW LOW 30 MIN: CPT | Performed by: SURGERY

## 2019-04-09 NOTE — LETTER
2019       Re: Oscar Greenberg - 1965    [facility]     Oscar Greenberg  Medical Record #:  [id number]  YOB: 1965  Age:  53 year old      Date of Service: 2019     PRIMARY CARE PROVIDER: Rafa Mccoy      Reason for visit:  Left leg swelling, varicose veins.     IMPRESSION:  52 yo male with bilateral lower leg varicose veins, L>R and swelling with heaviness, C3 disease.     RECOMMENDATION:  I discussed with Oscar the need to start compression therapy and he is being measured for knee high hose today 20-30 mmHg.  Also, I'd like to see him back in 3 months in [place] for evaluation of his venous insufficiency with a left leg venous incompetency study.  He is happy with this plan and will see me in follow up.     HPI:  Oscar Greenberg is a 53 year old male who was seen today in consultation as requested by Dr. Mccoy regarding bilateral lower leg varicose veins and swelling.  He describes heaviness in his lower leg and foot that improves with elevation and is worse at the end of the day.  He denies phlebitis or previous DVT and his family history is only positive for a brother with mild venous disease.  Currently, he does not take pain medications for relief and is not limited at this time from his disease.       REVIEW OF SYSTEMS:    A 12 point ROS was reviewed and except for what is listed in the HPI above, all others are negative     PE:  BP (!) 135/92 (BP Location: Right arm, Patient Position: Chair, Cuff Size: Adult Regular)   Pulse 85   Resp 16       Wt Readings from Last 1 Encounters:   19 282 lb (127.9 kg)      There is no height or weight on file to calculate BMI.     EXAM:  GENERAL: This is a well-developed 53 year old male who appears his stated age  EYES: Grossly normal.  MOUTH: Buccal mucosa normal   CARDIAC:  NS1 S2, No Murmur  CHEST/LUNG:  Clear lung fields bilaterally   GASTROINTESINAL (ABDOMEN): Soft,  non-tender, B/S present, no pulsatile mass  MUSCULOSKELETAL: Grossly normal and both lower extremities are intact.  HEME/LYMPH: No lymphedema  NEUROLOGIC: Focally intact, Alert and oriented x 3.   PSYCH: appropriate affect  INTEGUMENT: No open lesions or ulcers.  The left medial calf there are 4-6 mm varicose veins in a cluster and communicate with the left thigh.  Mild small varicose veins in the right lower leg.  Significant lower leg edema noted in the left lower leg compared to the right side.  No evidence of phlebitis.  Some superficial clusters of spider veins in the area of this nest of varicose veins.     Pulse Exam:      Radial: Left 1              Right  2     DP:      Left 1              Right  1     PT:       Left 1              Right  1      LABS:            Sodium   Date Value Ref Range Status   06/22/2016 135 133 - 144 mmol/L Final   06/21/2016 135 133 - 144 mmol/L Final   06/20/2016 135 133 - 144 mmol/L Final            Urea Nitrogen   Date Value Ref Range Status   06/22/2016 12 7 - 30 mg/dL Final   06/21/2016 12 7 - 30 mg/dL Final   06/20/2016 7 7 - 30 mg/dL Final            Hemoglobin   Date Value Ref Range Status   06/21/2016 10.7 (L) 13.3 - 17.7 g/dL Final   06/20/2016 13.2 (L) 13.3 - 17.7 g/dL Final   06/07/2016 12.3 (L) 13.3 - 17.7 g/dL Final            Platelet Count   Date Value Ref Range Status   06/21/2016 170 150 - 450 10e9/L Final   06/20/2016 256 150 - 450 10e9/L Final   06/07/2016 262 150 - 450 10e9/L Final            INR   Date Value Ref Range Status   06/20/2016 1.55 (H) 0.86 - 1.14 Final   05/20/2016 1.11 0.86 - 1.14 Final   05/19/2016 1.11 0.86 - 1.14 Final         Vincent Alfaro MD  VASCULAR SURGERY

## 2019-04-09 NOTE — NURSING NOTE
"Initial BP (!) 135/92 (BP Location: Right arm, Patient Position: Chair, Cuff Size: Adult Regular)   Pulse 85   Resp 16  Estimated body mass index is 35.96 kg/m  as calculated from the following:    Height as of 10/18/18: 1.886 m (6' 2.25\").    Weight as of 2/8/19: 127.9 kg (282 lb). .    Patient is here for a consult for  Veins.  padmaja chanel LPN    "

## 2019-04-09 NOTE — PROGRESS NOTES
[facility]ASC[facility]GERY CLINIC CONSULTATION    VASCULAR SURGEON: Vincent Alfaro MD    LOCATION:  [facility]    Oscar Greenberg   Medical Record #:  [id number]  YOB: 1965  Age:  53 year old     Date of Service: 4/9/2019    PRIMARY CARE PROVIDER: Rafa Mccoy      Reason for visit:  Left leg swelling, varicose veins.    IMPRESSION:  52 yo male with bilateral lower leg varicose veins, L>R and swelling with heaviness, C3 disease.    RECOMMENDATION:  I discussed with Oscar the need to start compression therapy and he is being measured for knee high hose today 20-30 mmHg.  Also, I'd like to see him back in 3 months in [place] for evaluation of his venous insufficiency with a left leg venous incompetency study.  He is happy with this plan and will see me in follow up.    HPI:  Oscar Greenberg is a 53 year old male who was seen today in consultation as requested by Dr. Mccoy regarding bilateral lower leg varicose veins and swelling.  He describes heaviness in his lower leg and foot that improves with elevation and is worse at the end of the day.  He denies phlebitis or previous DVT and his family history is only positive for a brother with mild venous disease.  Currently, he does not take pain medications for relief and is not limited at this time from his disease.     PHH:    Past Medical History:   Diagnosis Date     Alcohol abuse         Past Surgical History:   Procedure Laterality Date     COLONOSCOPY N/A 2/8/2019    Procedure: COMBINED COLONOSCOPY, SINGLE OR MULTIPLE BIOPSY/POLYPECTOMY BY BIOPSY;  Surgeon: Toño Escamilla DO;  Location: [facility]     ENDOSCOPIC RETROGRADE CHOLANGIOPANCREATOGRAM N/A 6/7/2016    Procedure: COMBINED ENDOSCOPIC RETROGRADE CHOLANGIOPANCREATOGRAPHY, PLACE TUBE/STENT;  Surgeon: Stephane Hunter MD;  Location: [facility]     LAPAROSCOPIC CHOLECYSTECTOMY WITH CHOLANGIOGRAMS N/A 6/2/2016    Procedure: LAPAROSCOPIC  CHOLECYSTECTOMY WITH CHOLANGIOGRAMS;  Surgeon: Darian Mcclain MD;  Location: [facility]     NO HISTORY OF SURGERY         ALLERGIES:  Nka [no known allergies]    MEDS:    Current Outpatient Medications:      IBUPROFEN PO, Take 400 mg by mouth every 8 hours as needed When needed, Disp: , Rfl:      omeprazole 20 MG tablet, Take 20 mg by mouth daily as needed, Disp: , Rfl:     SOCIAL HABITS:    History   Smoking Status     Never Smoker   Smokeless Tobacco     Never Used     Social History    Substance and Sexual Activity      Alcohol use: Yes        Comment: occ.       History   Drug Use No       FAMILY HISTORY:    Family History   Problem Relation Age of Onset     Unknown/Adopted Maternal Grandmother      C.A.D. Maternal Grandfather      Unknown/Adopted Paternal Grandmother      Cancer Paternal Grandfather        REVIEW OF SYSTEMS:    A 12 point ROS was reviewed and except for what is listed in the HPI above, all others are negative    PE:  BP (!) 135/92 (BP Location: Right arm, Patient Position: Chair, Cuff Size: Adult Regular)   Pulse 85   Resp 16   Wt Readings from Last 1 Encounters:   02/08/19 282 lb (127.9 kg)     There is no height or weight on file to calculate BMI.    EXAM:  GENERAL: This is a well-developed 53 year old male who appears his stated age  EYES: Grossly normal.  MOUTH: Buccal mucosa normal   CARDIAC:  NS1 S2, No Murmur  CHEST/LUNG:  Clear lung fields bilaterally   GASTROINTESINAL (ABDOMEN): Soft, non-tender, B/S present, no pulsatile mass  MUSCULOSKELETAL: Grossly normal and both lower extremities are intact.  HEME/LYMPH: No lymphedema  NEUROLOGIC: Focally intact, Alert and oriented x 3.   PSYCH: appropriate affect  INTEGUMENT: No open lesions or ulcers.  The left medial calf there are 4-6 mm varicose veins in a cluster and communicate with the left thigh.  Mild small varicose veins in the right lower leg.  Significant lower leg edema noted in the left lower leg compared to the right side.  No  evidence of phlebitis.  Some superficial clusters of spider veins in the area of this nest of varicose veins.    Pulse Exam:     Radial: Left 1   Right  2    DP: Left 1   Right  1     PT:   Left 1   Right  1          LABS:      Sodium   Date Value Ref Range Status   06/22/2016 135 133 - 144 mmol/L Final   06/21/2016 135 133 - 144 mmol/L Final   06/20/2016 135 133 - 144 mmol/L Final     Urea Nitrogen   Date Value Ref Range Status   06/22/2016 12 7 - 30 mg/dL Final   06/21/2016 12 7 - 30 mg/dL Final   06/20/2016 7 7 - 30 mg/dL Final     Hemoglobin   Date Value Ref Range Status   06/21/2016 10.7 (L) 13.3 - 17.7 g/dL Final   06/20/2016 13.2 (L) 13.3 - 17.7 g/dL Final   06/07/2016 12.3 (L) 13.3 - 17.7 g/dL Final     Platelet Count   Date Value Ref Range Status   06/21/2016 170 150 - 450 10e9/L Final   06/20/2016 256 150 - 450 10e9/L Final   06/07/2016 262 150 - 450 10e9/L Final     INR   Date Value Ref Range Status   06/20/2016 1.55 (H) 0.86 - 1.14 Final   05/20/2016 1.11 0.86 - 1.14 Final   05/19/2016 1.11 0.86 - 1.14 Final       Vincent Alfaro MD  VASCULAR SURGERY

## 2019-07-10 ENCOUNTER — HOSPITAL ENCOUNTER (EMERGENCY)
Facility: CLINIC | Age: 54
Discharge: HOME | End: 2019-07-10
Attending: EMERGENCY MEDICINE | Admitting: EMERGENCY MEDICINE
Payer: COMMERCIAL

## 2019-07-10 VITALS
HEART RATE: 95 BPM | HEIGHT: 74 IN | SYSTOLIC BLOOD PRESSURE: 130 MMHG | BODY MASS INDEX: 35.94 KG/M2 | DIASTOLIC BLOOD PRESSURE: 83 MMHG | OXYGEN SATURATION: 95 % | WEIGHT: 280 LBS | TEMPERATURE: 98 F | RESPIRATION RATE: 16 BRPM

## 2019-07-10 DIAGNOSIS — R11.2 NON-INTRACTABLE VOMITING WITH NAUSEA, UNSPECIFIED VOMITING TYPE: ICD-10-CM

## 2019-07-10 DIAGNOSIS — F41.9 ANXIETY: ICD-10-CM

## 2019-07-10 DIAGNOSIS — R25.2 CRAMP OF LIMB: ICD-10-CM

## 2019-07-10 LAB
ALBUMIN SERPL-MCNC: 4.2 G/DL (ref 3.4–5)
ALP SERPL-CCNC: 102 U/L (ref 40–150)
ALT SERPL W P-5'-P-CCNC: 137 U/L (ref 0–70)
ANION GAP SERPL CALCULATED.3IONS-SCNC: 19 MMOL/L (ref 3–14)
AST SERPL W P-5'-P-CCNC: 147 U/L (ref 0–45)
BASOPHILS # BLD AUTO: 0.1 10E9/L (ref 0–0.2)
BASOPHILS NFR BLD AUTO: 0.9 %
BILIRUB SERPL-MCNC: 1.9 MG/DL (ref 0.2–1.3)
BUN SERPL-MCNC: 7 MG/DL (ref 7–30)
CALCIUM SERPL-MCNC: 9.5 MG/DL (ref 8.5–10.1)
CHLORIDE SERPL-SCNC: 98 MMOL/L (ref 94–109)
CO2 SERPL-SCNC: 20 MMOL/L (ref 20–32)
CREAT SERPL-MCNC: 0.71 MG/DL (ref 0.66–1.25)
DIFFERENTIAL METHOD BLD: ABNORMAL
EOSINOPHIL # BLD AUTO: 0 10E9/L (ref 0–0.7)
EOSINOPHIL NFR BLD AUTO: 0 %
ERYTHROCYTE [DISTWIDTH] IN BLOOD BY AUTOMATED COUNT: 12.1 % (ref 10–15)
GFR SERPL CREATININE-BSD FRML MDRD: >90 ML/MIN/{1.73_M2}
GLUCOSE SERPL-MCNC: 136 MG/DL (ref 70–99)
HCT VFR BLD AUTO: 47.4 % (ref 40–53)
HGB BLD-MCNC: 16 G/DL (ref 13.3–17.7)
IMM GRANULOCYTES # BLD: 0 10E9/L (ref 0–0.4)
IMM GRANULOCYTES NFR BLD: 0.5 %
LIPASE SERPL-CCNC: 144 U/L (ref 73–393)
LYMPHOCYTES # BLD AUTO: 0.7 10E9/L (ref 0.8–5.3)
LYMPHOCYTES NFR BLD AUTO: 12.5 %
MCH RBC QN AUTO: 33.9 PG (ref 26.5–33)
MCHC RBC AUTO-ENTMCNC: 33.8 G/DL (ref 31.5–36.5)
MCV RBC AUTO: 100 FL (ref 78–100)
MONOCYTES # BLD AUTO: 0.5 10E9/L (ref 0–1.3)
MONOCYTES NFR BLD AUTO: 8.6 %
NEUTROPHILS # BLD AUTO: 4.5 10E9/L (ref 1.6–8.3)
NEUTROPHILS NFR BLD AUTO: 77.5 %
NRBC # BLD AUTO: 0 10*3/UL
NRBC BLD AUTO-RTO: 0 /100
PLATELET # BLD AUTO: 154 10E9/L (ref 150–450)
POTASSIUM SERPL-SCNC: 3.5 MMOL/L (ref 3.4–5.3)
PROT SERPL-MCNC: 8.5 G/DL (ref 6.8–8.8)
RBC # BLD AUTO: 4.72 10E12/L (ref 4.4–5.9)
SODIUM SERPL-SCNC: 137 MMOL/L (ref 133–144)
TROPONIN I SERPL-MCNC: <0.015 UG/L (ref 0–0.04)
WBC # BLD AUTO: 5.8 10E9/L (ref 4–11)

## 2019-07-10 PROCEDURE — 25000128 H RX IP 250 OP 636: Performed by: EMERGENCY MEDICINE

## 2019-07-10 PROCEDURE — 84484 ASSAY OF TROPONIN QUANT: CPT | Performed by: EMERGENCY MEDICINE

## 2019-07-10 PROCEDURE — 80053 COMPREHEN METABOLIC PANEL: CPT | Performed by: EMERGENCY MEDICINE

## 2019-07-10 PROCEDURE — 96375 TX/PRO/DX INJ NEW DRUG ADDON: CPT | Performed by: EMERGENCY MEDICINE

## 2019-07-10 PROCEDURE — 93005 ELECTROCARDIOGRAM TRACING: CPT | Performed by: EMERGENCY MEDICINE

## 2019-07-10 PROCEDURE — 93010 ELECTROCARDIOGRAM REPORT: CPT | Mod: Z6 | Performed by: EMERGENCY MEDICINE

## 2019-07-10 PROCEDURE — 99285 EMERGENCY DEPT VISIT HI MDM: CPT | Mod: 25 | Performed by: EMERGENCY MEDICINE

## 2019-07-10 PROCEDURE — 83690 ASSAY OF LIPASE: CPT | Performed by: EMERGENCY MEDICINE

## 2019-07-10 PROCEDURE — 96361 HYDRATE IV INFUSION ADD-ON: CPT | Performed by: EMERGENCY MEDICINE

## 2019-07-10 PROCEDURE — 85025 COMPLETE CBC W/AUTO DIFF WBC: CPT | Performed by: EMERGENCY MEDICINE

## 2019-07-10 PROCEDURE — 96374 THER/PROPH/DIAG INJ IV PUSH: CPT | Performed by: EMERGENCY MEDICINE

## 2019-07-10 RX ORDER — LORAZEPAM 2 MG/ML
1 INJECTION INTRAMUSCULAR ONCE
Status: COMPLETED | OUTPATIENT
Start: 2019-07-10 | End: 2019-07-10

## 2019-07-10 RX ORDER — ONDANSETRON 2 MG/ML
4 INJECTION INTRAMUSCULAR; INTRAVENOUS EVERY 30 MIN PRN
Status: DISCONTINUED | OUTPATIENT
Start: 2019-07-10 | End: 2019-07-11 | Stop reason: HOSPADM

## 2019-07-10 RX ORDER — LORAZEPAM 1 MG/1
.5-1 TABLET ORAL EVERY 6 HOURS PRN
Qty: 15 TABLET | Refills: 0 | Status: SHIPPED | OUTPATIENT
Start: 2019-07-10 | End: 2021-10-25

## 2019-07-10 RX ORDER — ONDANSETRON 4 MG/1
4 TABLET, ORALLY DISINTEGRATING ORAL EVERY 8 HOURS PRN
Qty: 10 TABLET | Refills: 0 | Status: SHIPPED | OUTPATIENT
Start: 2019-07-10 | End: 2019-07-13

## 2019-07-10 RX ADMIN — ONDANSETRON 4 MG: 2 INJECTION INTRAMUSCULAR; INTRAVENOUS at 20:22

## 2019-07-10 RX ADMIN — LORAZEPAM 1 MG: 2 INJECTION INTRAMUSCULAR; INTRAVENOUS at 20:52

## 2019-07-10 RX ADMIN — SODIUM CHLORIDE 1000 ML: 9 INJECTION, SOLUTION INTRAVENOUS at 21:52

## 2019-07-10 RX ADMIN — SODIUM CHLORIDE 1000 ML: 9 INJECTION, SOLUTION INTRAVENOUS at 20:22

## 2019-07-10 ASSESSMENT — ENCOUNTER SYMPTOMS
SHORTNESS OF BREATH: 0
ABDOMINAL PAIN: 0
FEVER: 0
NAUSEA: 1
VOMITING: 1
DIARRHEA: 1

## 2019-07-10 ASSESSMENT — MIFFLIN-ST. JEOR: SCORE: 2194.82

## 2019-07-10 NOTE — ED AVS SNAPSHOT
[facility] Emergency Department  [address] [address]  Phone:  [phone]  Fax:  [phone]                                    Oscar Greenberg   MRN: [id number]    Department:  [facility] Emergency Department   Date of Visit:  7/10/2019           After Visit Summary Signature Page    I have received my discharge instructions, and my questions have been answered. I have discussed any challenges I see with this plan with the nurse or doctor.    ..........................................................................................................................................  Patient/Patient Representative Signature      ..........................................................................................................................................  Patient Representative Print Name and Relationship to Patient    ..................................................               ................................................  Date                                   Time    ..........................................................................................................................................  Reviewed by Signature/Title    ...................................................              ..............................................  Date                                               Time          22EPIC Rev 08/18

## 2019-07-11 NOTE — ED PROVIDER NOTES
History     Chief Complaint   Patient presents with     Nausea, Vomiting, & Diarrhea     started 2 days ago, took one of his wifes BP meds at 1300 today because he thought he could use it, not on meds for BP and wasn't having high BP that he is aware of      Panic Attack     had two of them today      HPI  Oscar Greenberg is a 53 year old male who has past medical history significant for alcohol abuse, GERD, history of prior cholecystectomy, presenting to the emergency department with a presently 2-day history of nausea, vomiting, diarrhea.  Patient also has had significant amounts of stresses in his life, and reports that he has had increased amounts of anxiety, which is not typical for the patient.  Later, during his ED course, patient also reports having significant amounts of compulsive behavior, especially with letters of the alphabet, reading street signs, and having compulsions to try to re-create words using ladders in the sports on the street signs.  Patient reports that over the past 2 days has had decreased appetite, with no significant abdominal pains, however has had increased amounts of nausea, with associated vomiting, diarrhea.  Patient does drink alcohol frequently, almost a daily basis for last drink of alcohol was at approximately 2 PM this afternoon.  Patient reports daily diarrhea, with no change in bowel movements.  No urinary symptoms.  Patient normally does not take home medications, however did take 1 of his wife's anxiety, and blood pressure medications this afternoon to see if they would help with symptoms    Allergies:  Allergies   Allergen Reactions     Nka [No Known Allergies]        Problem List:    Patient Active Problem List    Diagnosis Date Noted     Venous (peripheral) insufficiency 04/09/2019     Priority: Medium     Colon polyps 02/13/2019     Priority: Medium     FINAL DIAGNOSIS:   A.  Right colon, mucosal biopsy:   - Tubular adenoma.   - Negative for high-grade dysplasia and  malignancy.     B.  Right colon, mucosal biopsies:   - Several fragments of tubular adenoma.   - Negative for high-grade dysplasia and malignancy     C.  Sigmoid colon, mucosal biopsies:   - Tubular adenoma and hyperplastic polyp.   - Negative for high-grade dysplasia and malignancy.        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     Priority: Medium     GERD (gastroesophageal reflux disease) 10/23/2008     Priority: Medium     ALCOH USE 01/11/2007     Priority: Medium     500 ML per day in Jan o7  Chem dep eval recommended       Contact dermatitis and other eczema, due to unspecified cause 01/11/2007     Priority: Medium     Nonspecific elevation of levels of transaminase or lactic acid dehydrogenase (LDH) 01/11/2007     Priority: Medium     Suspect etoh related          Past Medical History:    Past Medical History:   Diagnosis Date     Alcohol abuse        Past Surgical History:    Past Surgical History:   Procedure Laterality Date     COLONOSCOPY N/A 2/8/2019    Procedure: COMBINED COLONOSCOPY, SINGLE OR MULTIPLE BIOPSY/POLYPECTOMY BY BIOPSY;  Surgeon: Toño Escamilla DO;  Location: [facility]     ENDOSCOPIC RETROGRADE CHOLANGIOPANCREATOGRAM N/A 6/7/2016    Procedure: COMBINED ENDOSCOPIC RETROGRADE CHOLANGIOPANCREATOGRAPHY, PLACE TUBE/STENT;  Surgeon: Stephane Hunter MD;  Location: [facility]     LAPAROSCOPIC CHOLECYSTECTOMY WITH CHOLANGIOGRAMS N/A 6/2/2016    Procedure: LAPAROSCOPIC CHOLECYSTECTOMY WITH CHOLANGIOGRAMS;  Surgeon: Darian Mcclain MD;  Location: [facility]     NO HISTORY OF SURGERY         Family History:    Family History   Problem Relation Age of Onset     Unknown/Adopted Maternal Grandmother   "    C.A.D. Maternal Grandfather      Unknown/Adopted Paternal Grandmother      Cancer Paternal Grandfather        Social History:  Marital Status:   [2]  Social History     Tobacco Use     Smoking status: Never Smoker     Smokeless tobacco: Never Used   Substance Use Topics     Alcohol use: Yes     Comment: occ.      Drug use: No        Medications:      IBUPROFEN PO   LORazepam (ATIVAN) 1 MG tablet   omeprazole 20 MG tablet   ondansetron (ZOFRAN ODT) 4 MG ODT tab         Review of Systems   Constitutional: Negative for fever.   Respiratory: Negative for shortness of breath.    Cardiovascular: Negative for chest pain.   Gastrointestinal: Positive for diarrhea, nausea and vomiting. Negative for abdominal pain.   All other systems reviewed and are negative.      Physical Exam   BP: 148/89  Pulse: 97  Heart Rate: 101  Temp: 98  F (36.7  C)  Resp: 16  Height: 188 cm (6' 2\")  Weight: 127 kg (280 lb)  SpO2: 99 %      Physical Exam  /83   Pulse 95   Temp 98  F (36.7  C) (Oral)   Resp 16   Ht 1.88 m (6' 2\")   Wt 127 kg (280 lb)   SpO2 95%   BMI 35.95 kg/m    General: alert, interactive, anxious appearing, with slight tremulousness  Head: atraumatic  Nose: no rhinorrhea or epistaxis  Ears: no external auditory canal discharge or bleeding.    Eyes: Sclera nonicteric. Conjunctiva noninjected. PERRL, EOMI  Mouth: no tonsillar erythema, edema, or exudate  Neck: supple, no palp LAD  Lungs: CTAB  CV: RRR, S1/S2; peripheral pulses palpable and symmetric  Abdomen: soft, nt, nd, no guarding or rebound. Positive bowel sounds  Extremities: no cyanosis or edema  Skin: no rash or diaphoresis  Neuro:  strength 5/5 in UE and LEs bilaterally, sensation intact to light touch in UE and LEs bilaterally;       ED Course        Procedures  EKG showing sinus rhythm.  Rate 94.  Nonspecific ST-T segment changes.  No acute ischemic changes.             Critical Care time:  none               Results for orders placed or performed " during the hospital encounter of 07/10/19 (from the past 24 hour(s))   CBC with platelets differential   Result Value Ref Range    WBC 5.8 4.0 - 11.0 10e9/L    RBC Count 4.72 4.4 - 5.9 10e12/L    Hemoglobin 16.0 13.3 - 17.7 g/dL    Hematocrit 47.4 40.0 - 53.0 %     78 - 100 fl    MCH 33.9 (H) 26.5 - 33.0 pg    MCHC 33.8 31.5 - 36.5 g/dL    RDW 12.1 10.0 - 15.0 %    Platelet Count 154 150 - 450 10e9/L    Diff Method Automated Method     % Neutrophils 77.5 %    % Lymphocytes 12.5 %    % Monocytes 8.6 %    % Eosinophils 0.0 %    % Basophils 0.9 %    % Immature Granulocytes 0.5 %    Nucleated RBCs 0 0 /100    Absolute Neutrophil 4.5 1.6 - 8.3 10e9/L    Absolute Lymphocytes 0.7 (L) 0.8 - 5.3 10e9/L    Absolute Monocytes 0.5 0.0 - 1.3 10e9/L    Absolute Eosinophils 0.0 0.0 - 0.7 10e9/L    Absolute Basophils 0.1 0.0 - 0.2 10e9/L    Abs Immature Granulocytes 0.0 0 - 0.4 10e9/L    Absolute Nucleated RBC 0.0    Comprehensive metabolic panel   Result Value Ref Range    Sodium 137 133 - 144 mmol/L    Potassium 3.5 3.4 - 5.3 mmol/L    Chloride 98 94 - 109 mmol/L    Carbon Dioxide 20 20 - 32 mmol/L    Anion Gap 19 (H) 3 - 14 mmol/L    Glucose 136 (H) 70 - 99 mg/dL    Urea Nitrogen 7 7 - 30 mg/dL    Creatinine 0.71 0.66 - 1.25 mg/dL    GFR Estimate >90 >60 mL/min/[1.73_m2]    GFR Estimate If Black >90 >60 mL/min/[1.73_m2]    Calcium 9.5 8.5 - 10.1 mg/dL    Bilirubin Total 1.9 (H) 0.2 - 1.3 mg/dL    Albumin 4.2 3.4 - 5.0 g/dL    Protein Total 8.5 6.8 - 8.8 g/dL    Alkaline Phosphatase 102 40 - 150 U/L     (H) 0 - 70 U/L     (H) 0 - 45 U/L   Lipase   Result Value Ref Range    Lipase 144 73 - 393 U/L   Troponin I   Result Value Ref Range    Troponin I ES <0.015 0.000 - 0.045 ug/L       Medications   ondansetron (ZOFRAN) injection 4 mg (4 mg Intravenous Given 7/10/19 2022)   0.9% sodium chloride BOLUS (0 mLs Intravenous Stopped 7/10/19 2127)   LORazepam (ATIVAN) injection 1 mg (1 mg Intravenous Given 7/10/19 2052)    0.9% sodium chloride BOLUS (0 mLs Intravenous Stopped 7/10/19 2961)       Assessments & Plan (with Medical Decision Making)  53 year old male, with past medical history as reviewed above, presenting to the emergency department with concerns regarding nausea, vomiting, diarrhea, which have been present over the past 2 days.  Also with increased amounts of anxiety during the day today.  Patient denies any recent triggers to his change in appetite over the past 2 days, or the significant amount of vomiting that has occurred over the past 2 days.  He does have slight amounts of left upper abdominal pain, however no significant tenderness to palpation on physical exam.  Patient also reports that he has had increased amounts of anxiety recently, with panic attack symptoms which have occurred on 2 occasions during the day today, which is not typical of the patient.  He does have significant history of alcohol abuse, and last alcohol was this afternoon.  No reports of alcohol withdrawal type issues, however patient is slightly tremulous upon arrival.  This did improve after Ativan was administered.    Laboratory work-up showing CBC which is normal, with normal white blood cell count.  CMP does have slightly elevated anion gap, with elevated LFTs, nonspecific in nature.  Remainder of CMP is unremarkable.  Given patient's left upper abdominal pain, decision was made to obtain EKG, in addition to troponin.  These are both normal/negative.  His LFTs are slightly elevated, however in the context of significant amounts of alcohol use, of uncertain significance.  No significant right upper quadrant tenderness to palpation.  Patient has had prior cholecystectomy.    Patient was given Zofran, in addition to Ativan.  Had great improvement of his symptoms.  He will be discharged home, and encouraged follow-up in clinic.  Return if worsening symptoms.     I have reviewed the nursing notes.    I have reviewed the findings, diagnosis,  plan and need for follow up with the patient.          Medication List      Started    LORazepam 1 MG tablet  Commonly known as:  ATIVAN  0.5-1 mg, Oral, EVERY 6 HOURS PRN     ondansetron 4 MG ODT tab  Commonly known as:  ZOFRAN ODT  4 mg, Oral, EVERY 8 HOURS PRN            Final diagnoses:   Vomiting   Cramp of limb   Anxiety       7/10/2019   [facility] [place] EMERGENCY DEPARTMENT     Seth Cornell MD  07/10/19 8276

## 2019-07-11 NOTE — ED NOTES
Patient states yesterday he started having a decreased appetite today started with vomiting and diarrhea. Patient states also having some left upper abdominal pain that goes towards the middle. Patient thinks he is also having panic attack experiencing increased dizziness and confusion intermittently. Patient states having stress 10/10 right now.

## 2019-07-11 NOTE — DISCHARGE INSTRUCTIONS
Zofran as needed for nausea, vomiting.    Ativan as needed for anxiety.    Recommend following up with primary care provider in the next week.

## 2019-07-17 ENCOUNTER — OFFICE VISIT (OUTPATIENT)
Dept: FAMILY MEDICINE | Facility: CLINIC | Age: 54
End: 2019-07-17
Payer: COMMERCIAL

## 2019-07-17 VITALS
SYSTOLIC BLOOD PRESSURE: 110 MMHG | HEIGHT: 74 IN | BODY MASS INDEX: 35.68 KG/M2 | HEART RATE: 85 BPM | TEMPERATURE: 98 F | OXYGEN SATURATION: 96 % | WEIGHT: 278 LBS | DIASTOLIC BLOOD PRESSURE: 86 MMHG | RESPIRATION RATE: 16 BRPM

## 2019-07-17 DIAGNOSIS — Z90.49 S/P CHOLECYSTECTOMY: ICD-10-CM

## 2019-07-17 DIAGNOSIS — K31.9 DYSPEPSIA AND DISORDER OF FUNCTION OF STOMACH: ICD-10-CM

## 2019-07-17 DIAGNOSIS — R10.13 DYSPEPSIA AND DISORDER OF FUNCTION OF STOMACH: ICD-10-CM

## 2019-07-17 DIAGNOSIS — R74.01 TRANSAMINASEMIA: ICD-10-CM

## 2019-07-17 DIAGNOSIS — F42.8 OBSESSIVE REACTION: ICD-10-CM

## 2019-07-17 DIAGNOSIS — F43.22 ADJUSTMENT DISORDER WITH ANXIOUS MOOD: Primary | ICD-10-CM

## 2019-07-17 LAB
ALBUMIN SERPL-MCNC: 3.3 G/DL (ref 3.4–5)
ALP SERPL-CCNC: 94 U/L (ref 40–150)
ALT SERPL W P-5'-P-CCNC: 175 U/L (ref 0–70)
ANION GAP SERPL CALCULATED.3IONS-SCNC: 5 MMOL/L (ref 3–14)
AST SERPL W P-5'-P-CCNC: 163 U/L (ref 0–45)
BILIRUB SERPL-MCNC: 0.6 MG/DL (ref 0.2–1.3)
BUN SERPL-MCNC: 8 MG/DL (ref 7–30)
CALCIUM SERPL-MCNC: 9.1 MG/DL (ref 8.5–10.1)
CHLORIDE SERPL-SCNC: 103 MMOL/L (ref 94–109)
CO2 SERPL-SCNC: 29 MMOL/L (ref 20–32)
CREAT SERPL-MCNC: 0.58 MG/DL (ref 0.66–1.25)
GFR SERPL CREATININE-BSD FRML MDRD: >90 ML/MIN/{1.73_M2}
GLUCOSE SERPL-MCNC: 115 MG/DL (ref 70–99)
POTASSIUM SERPL-SCNC: 3.5 MMOL/L (ref 3.4–5.3)
PROT SERPL-MCNC: 7.5 G/DL (ref 6.8–8.8)
SODIUM SERPL-SCNC: 137 MMOL/L (ref 133–144)

## 2019-07-17 PROCEDURE — 36415 COLL VENOUS BLD VENIPUNCTURE: CPT | Performed by: FAMILY MEDICINE

## 2019-07-17 PROCEDURE — 80053 COMPREHEN METABOLIC PANEL: CPT | Performed by: FAMILY MEDICINE

## 2019-07-17 PROCEDURE — 99214 OFFICE O/P EST MOD 30 MIN: CPT | Performed by: FAMILY MEDICINE

## 2019-07-17 RX ORDER — OMEPRAZOLE 40 MG/1
40 CAPSULE, DELAYED RELEASE ORAL DAILY
Qty: 30 CAPSULE | Refills: 0 | Status: SHIPPED | OUTPATIENT
Start: 2019-07-17 | End: 2021-10-25

## 2019-07-17 ASSESSMENT — MIFFLIN-ST. JEOR: SCORE: 2185.75

## 2019-07-17 ASSESSMENT — PATIENT HEALTH QUESTIONNAIRE - PHQ9
5. POOR APPETITE OR OVEREATING: MORE THAN HALF THE DAYS
SUM OF ALL RESPONSES TO PHQ QUESTIONS 1-9: 14

## 2019-07-17 ASSESSMENT — ANXIETY QUESTIONNAIRES
2. NOT BEING ABLE TO STOP OR CONTROL WORRYING: MORE THAN HALF THE DAYS
1. FEELING NERVOUS, ANXIOUS, OR ON EDGE: MORE THAN HALF THE DAYS
IF YOU CHECKED OFF ANY PROBLEMS ON THIS QUESTIONNAIRE, HOW DIFFICULT HAVE THESE PROBLEMS MADE IT FOR YOU TO DO YOUR WORK, TAKE CARE OF THINGS AT HOME, OR GET ALONG WITH OTHER PEOPLE: SOMEWHAT DIFFICULT
7. FEELING AFRAID AS IF SOMETHING AWFUL MIGHT HAPPEN: SEVERAL DAYS
5. BEING SO RESTLESS THAT IT IS HARD TO SIT STILL: SEVERAL DAYS
GAD7 TOTAL SCORE: 11
3. WORRYING TOO MUCH ABOUT DIFFERENT THINGS: MORE THAN HALF THE DAYS
6. BECOMING EASILY ANNOYED OR IRRITABLE: SEVERAL DAYS

## 2019-07-17 NOTE — PROGRESS NOTES
Subjective     Oscar Greenberg is a 53 year old male who presents to clinic today for the following health issues:    HPI   ED/UC Followup:    Facility:  [facility]  Date of visit: 7-  Reason for visit: ER NOTE IS COPIED BELOW:  Nausea, Vomiting, & Diarrhea    started 2 days ago, took one of his wifes BP meds at 1300 today because he thought he could use it, not on meds for BP and wasn't having high BP that he is aware of    Panic Attack   had two of them today       HPI from EMERGENCY DEPARTMENT:  Oscar Greenberg is a 53 year old male who has past medical history significant for alcohol abuse, GERD, history of prior cholecystectomy, presenting to the emergency department with a presently 2-day history of nausea, vomiting, diarrhea.  Patient also has had significant amounts of stresses in his life, and reports that he has had increased amounts of anxiety, which is not typical for the patient.  Later, during his ED course, patient also reports having significant amounts of compulsive behavior, especially with letters of the alphabet, reading street signs, and having compulsions to try to re-create words using ladders in the sports on the street signs.  Patient reports that over the past 2 days has had decreased appetite, with no significant abdominal pains, however has had increased amounts of nausea, with associated vomiting, diarrhea.  Patient does drink alcohol frequently, almost a daily basis for last drink of alcohol was at approximately 2 PM this afternoon.  Patient reports daily diarrhea, with no change in bowel movements.  No urinary symptoms.  Patient normally does not take home medications, however did take 1 of his wife's anxiety, and blood pressure medications this afternoon to see if they would help with symptoms     Current Status: Here to follow up on his labs, EKG and medication that was prescribed.  For the Lorazepam, he has taken 7 tablets on occasion when he has felt anxious or stressed, or knew  "there would be a situation to cause anxiety.  He is not sure if the medication has helped.     Patient reports recent stressors and worries: spouse's mental health condition; significant changes at work    Patient states has seen counselor only once with his wife. He states he intends to pursue further see one.    GASTRIC PROBLEM:  Would like to discuss about reflux symptoms and getting a Rx; reports intermittent unexplained nausea with emesis \"on occasion\". He is currently taking OTC Omeprazole as needed.  He takes 1-2 tablets in the morning, 5-6 days per week.  Taking more to prevent symptoms.  His father had his gallbladder removed and takes a powder form of medication due to that.  Pt would like to discuss about that for himself.  Patient is s/p cholecystectomy w/ cholangiograms. Since then \"bowels have just been a mess\" - multiple BMs per day with loose stools commonly.   Due to his symptoms, he does not eat as much as before.  Patient denies specific food triggers.  Patient reports he has decreased alcohol consumption - 300-400 ml per day, most days of the week.      Patient Active Problem List   Diagnosis     ALCOH USE     Contact dermatitis and other eczema, due to unspecified cause     Nonspecific elevation of levels of transaminase or lactic acid dehydrogenase (LDH)     GERD (gastroesophageal reflux disease)     Saphenous vein thrombophlebitis     CARDIOVASCULAR SCREENING; LDL GOAL LESS THAN 160     Screening for hypertension     Alcoholic ketoacidosis     Abdominal pain     Bacteremia     Colon polyps     Venous (peripheral) insufficiency     Past Surgical History:   Procedure Laterality Date     COLONOSCOPY N/A 2/8/2019    Procedure: COMBINED COLONOSCOPY, SINGLE OR MULTIPLE BIOPSY/POLYPECTOMY BY BIOPSY;  Surgeon: Toño Escamilla DO;  Location: [facility]     ENDOSCOPIC RETROGRADE CHOLANGIOPANCREATOGRAM N/A 6/7/2016    Procedure: COMBINED ENDOSCOPIC RETROGRADE CHOLANGIOPANCREATOGRAPHY, PLACE " TUBE/STENT;  Surgeon: Stephane Hunter MD;  Location: [facility]     LAPAROSCOPIC CHOLECYSTECTOMY WITH CHOLANGIOGRAMS N/A 6/2/2016    Procedure: LAPAROSCOPIC CHOLECYSTECTOMY WITH CHOLANGIOGRAMS;  Surgeon: Darian Mcclain MD;  Location: [facility]     NO HISTORY OF SURGERY         Social History     Tobacco Use     Smoking status: Never Smoker     Smokeless tobacco: Never Used   Substance Use Topics     Alcohol use: Yes     Comment: occ.      Family History   Problem Relation Age of Onset     Unknown/Adopted Maternal Grandmother      C.A.D. Maternal Grandfather      Unknown/Adopted Paternal Grandmother      Cancer Paternal Grandfather      Gallbladder Disease Father      Post-Traumatic Stress Disorder (PTSD) Father          Current Outpatient Medications   Medication Sig Dispense Refill     IBUPROFEN PO Take 400 mg by mouth every 8 hours as needed When needed       LORazepam (ATIVAN) 1 MG tablet Take 0.5-1 tablets (0.5-1 mg) by mouth every 6 hours as needed for anxiety 15 tablet 0     omeprazole (PRILOSEC) 40 MG DR capsule Take 1 capsule (40 mg) by mouth daily 30 capsule 0     omeprazole 20 MG tablet Take 20 mg by mouth daily as needed       Allergies   Allergen Reactions     Nka [No Known Allergies]        Reviewed and updated as needed this visit by Provider  Tobacco  Allergies  Meds  Problems  Med Hx  Surg Hx  Fam Hx         Review of Systems   C: NEGATIVE for fever, chills or change in weight  I: NEGATIVE for worrisome rashes, moles or lesions  E: NEGATIVE for vision changes or irritation  E/M: NEGATIVE for ear, mouth and throat problems  R: NEGATIVE for significant cough or SOB  CV: NEGATIVE for chest pain, palpitations or peripheral edema  GI: see above  : NEGATIVE for frequency, dysuria, or hematuria  N: NEGATIVE for weakness, dizziness or paresthesias  E: NEGATIVE for temperature intolerance, skin/hair changes  PSYCHIATRIC: see above      Objective    /86   Pulse 85   Temp 98  F (36.7  C)  "(Tympanic)   Resp 16   Ht 1.88 m (6' 2\")   Wt 126.1 kg (278 lb)   SpO2 96%   BMI 35.69 kg/m    Body mass index is 35.69 kg/m .  Physical Exam   GENERAL: obese, alert and no distress  NECK: no tenderness, no adenopathy,  Thyroid not enlarged  RESP: lungs clear to auscultation - no rales, no rhonchi, no wheezes  CV: regular rates and rhythm, normal S1 S2, no S3 or S4 and no murmur, no click or rub  ABD: rounded abdomen, no skin changes, no TTP today, no mass, no guarding, no Omntalvo's sign  MS: extremities- no gross deformities noted, no edema  SKIN: no jaundice or rash  PSYCH: well-kempt, linear thought process, normal mood, appropriate affect, no suicidality/aggression/hallucination  Diagnostic Test Results:  Labs reviewed in Epic  Results for orders placed or performed in visit on 07/17/19   Comprehensive metabolic panel   Result Value Ref Range    Sodium 137 133 - 144 mmol/L    Potassium 3.5 3.4 - 5.3 mmol/L    Chloride 103 94 - 109 mmol/L    Carbon Dioxide 29 20 - 32 mmol/L    Anion Gap 5 3 - 14 mmol/L    Glucose 115 (H) 70 - 99 mg/dL    Urea Nitrogen 8 7 - 30 mg/dL    Creatinine 0.58 (L) 0.66 - 1.25 mg/dL    GFR Estimate >90 >60 mL/min/[1.73_m2]    GFR Estimate If Black >90 >60 mL/min/[1.73_m2]    Calcium 9.1 8.5 - 10.1 mg/dL    Bilirubin Total 0.6 0.2 - 1.3 mg/dL    Albumin 3.3 (L) 3.4 - 5.0 g/dL    Protein Total 7.5 6.8 - 8.8 g/dL    Alkaline Phosphatase 94 40 - 150 U/L     (H) 0 - 70 U/L     (H) 0 - 45 U/L           Assessment & Plan     Oscar was seen today for er f/u and gastric problem.    Diagnoses and all orders for this visit:    Adjustment disorder with anxious mood  -     MENTAL HEALTH REFERRAL  - Adult; Outpatient Treatment, Assessments and Testing; General Psychological Testing; [facility] [place]: [facility] [phone]; Individual/Couples/Family/Group Therapy/Health Psychology; [facility]: [facility]...  Discussed with patient causes, course and treatment of " condition.  Non-suicidal, non-violent.    Recommended multimodal approach to management: medication and counseling; patient agreed to both  Advised options for medical treatment.  Discussed possible side effects of meds.  Discussed maintenance med may take a few weeks before noticing significant benefit.  Advised indications for benzodiazepines. Advised possible ADR. Since patient reports not much improvement from his persistent symptoms of anxiety and moods, advised to take it only for severe anxiety or panic-like symptoms.  Advised suicidal and homicidal precautions.    Obsessive reaction  -     MENTAL HEALTH REFERRAL  - Adult; Outpatient Treatment, Assessments and Testing; General Psychological Testing; [facility] [place] [facility] [phone]; Individual/Couples/Family/Group Therapy/Health Psychology; [facility] [place] [facility]...  May have underlying undiagnosed condition. OCD? Personality disorder? Bipolar disorder?  More extensive assessment per mental health provider.  Consider serotonin specific reuptake inhibitor if indicated.    Transaminasemia  -     Comprehensive metabolic panel  -     US Abdomen Limited; Future  Advised possible causes: biliary duct stone, fatty liver, hepatitis, alcohol.  Repeat CMP today. Consider hepatitis screen if increasing.    S/P cholecystectomy  -     Comprehensive metabolic panel  -     US Abdomen Limited; Future    Dyspepsia and disorder of function of stomach  -     US Abdomen Limited; Future  -     omeprazole (PRILOSEC) 40 MG DR capsule; Take 1 capsule (40 mg) by mouth daily  Could still be gastritis or GERD. Post-mona syndrome?  Trial of higher PPI dose for a few weeks, reinforced ways to reduce acid reflux.  Low fat diet.  Wait for US.        Patient Instructions     Schedule ultrasound.  To schedule this, call [phone].    You will be contacted in 1-2 days for results of your lab tests.    Increase omeprazole to 40 mg daily for 30 days.    Observe for  improvement of abdominal symptoms.    You will be contacted in 1-2 business days to get a schedule for the behavior therapist for evaluation and treatment.  If additional underlying diagnosis is arrived at, further treatment may be recommended.      Thank you for choosing [facility].  You may be receiving an email and/or telephone survey request from [facility] regarding your visit today.  Please take a few minutes to respond to the survey to let us know how we are doing.      If you have questions or concerns, please contact us via [employer] or you can contact your care team at [phone].    Our Clinic hours are:  Monday 6:40 am  to 7:00 pm  Tuesday -Friday 6:40 am to 5:00 pm    The [place] outpatient lab hours are:  Monday - Friday 6:10 am to 4:45 pm  Saturdays 7:00 am to 11:00 am  Appointments are required, call [phone]    If you have clinical questions after hours or would like to schedule an appointment,  call the clinic at [phone].    Patient Education     Understanding Functional Dyspepsia    Dyspepsia is a set of symptoms in the upper belly (abdomen) that are linked to digestion. You may feel full too quickly after eating, and have pain or a burning feeling. Or you may have other problems. In some cases, dyspepsia is caused by an infection or physical problem that can be treated. But functional dyspepsia isn t caused by a disease. The symptoms are long-term (chronic). You ll need to learn ways to manage your symptoms over time. This may include taking medicines. It may also mean making changes to your diet and managing your mental health.  How to say it  [id number]   What causes functional dyspepsia?  Experts are still learning what may cause functional dyspepsia. The symptoms are likely from a digestive tract that is very sensitive to certain things. These may include stress and some foods and drinks. In some cases, the symptoms may start after an infection with  bacteria, a virus, or parasites.  Symptoms of functional dyspepsia  Symptoms have lasted for 3 months or more and can include:    Feeling full too quickly    Burping a lot    A burning feeling in the middle of your chest    Pain that doesn t get better after a bowel movement or passing gas    Upset stomach (nausea) or vomiting after eating    Feeling bloated    Loss of appetite  You may also have symptoms of irritable bowel syndrome (IBS). These can include ongoing constipation or diarrhea.  Treatment for functional dyspepsia  Your healthcare provider may prescribe a medicine to help ease your symptoms. You may take one or more of these:    Medicine to reduce stomach acid. You may take an H2-receptor antagonist. Or you may take a proton pump inhibitor (PPI). These medicines lower the amount of acid your stomach makes.    Medicine to increase digestive movement. This is also called a motility medicine. You may be given metoclopramide.    Antidepressant or antianxiety medicine. Some of these types of medicines may help to reduce symptoms.    Medicine to treat a stomach bacteria. If tests showyou have a stomach bacteria, you will be prescribed antibiotics.  Living withfunctional dyspepsia  To manage your condition over time, you will also need to:    Change your diet. Caffeine, alcohol, and foods that are fatty or spicy can cause symptoms in some people. It may help to keep a diary of when your symptoms occur and what you were eating or drinking. This can help you find out what foods and drinks to avoid.    Focus on your mental health. Anxiety, depression, and stress can also cause symptoms in some people. Learning ways to manage your mental health can help reduce symptoms. This may include working with a counselor.  When to call your healthcare provider  Call your healthcare provider right away if you have any of these:    Symptoms that don t get better, or get worse    New symptoms    Vomiting that doesn t  stop    Vomiting blood    Bloody stool or black tarry stool    Unexplained weight loss   Date Last Reviewed: 6/1/2016 2000-2018 [employer]. [address] [place] [address], [place] [address]. All rights reserved. This information is not intended as a substitute for professional medical care. Always follow your healthcare professional's instructions.           Patient Education     Adjustment Disorder  Life changes--work, family, parents, children--each can cause a great deal of stress in life. An adjustment disorder means you have trouble dealing with this change and stress. This problem can have serious results. You may feel helpless, depressed, make bad decisions, or even feel like you want to hurt yourself.  Adjustment disorder can cause anxiety or depression. It is triggered by daily stresses such as:    Death of a loved one    Divorce    Marriage    General life changes such as changing or leaving a job    Moving    Illness or other health issue for you or a family member    Sex    Money     Symptoms may include:    Sadness or crying    Anxiety    Insomnia    Poor concentration    Trouble doing simple things    New problems at work or with family or friends    Loss of self-esteem    Sense of hopelessness    Feeling trapped or cut off from others  With this condition, it is common to feel sad, guilty, hopeless, and restless. These feelings may continue for weeks or months. It can be helpful to identify what is causing the additional stress and take steps to get extra support. If new stressful events do not happen, it is likely that you will gradually start feeling better.  Home care    If you have been given a prescription for medicine, take it as directed.    It helps to talk about your feelings and thoughts with family or friends who understand and support you.  Follow-up care  Follow up with your healthcare provider, or therapist as advised. Let them know if this condition does not improve or gets  worse.  When to seek medical advice  Call your healthcare provider right away if any of these happen:    Worsening depression or anxiety    Feeling out of control    Thoughts of harming yourself or another    Being unable to care for yourself  Date Last Reviewed: 10/1/2017    [id number] [employer]. [address] [place] [address]. All rights reserved. This information is not intended as a substitute for professional medical care. Always follow your healthcare professional's instructions.               Return in about 1 month (around 8/17/2019) for follow up for abdominal symptoms.    Rafa Mccoy MD  [facility]

## 2019-07-18 ASSESSMENT — ANXIETY QUESTIONNAIRES: GAD7 TOTAL SCORE: 11

## 2019-07-22 PROBLEM — F43.22 ADJUSTMENT DISORDER WITH ANXIOUS MOOD: Status: ACTIVE | Noted: 2019-07-22

## 2019-08-06 ENCOUNTER — APPOINTMENT (OUTPATIENT)
Dept: VASCULAR SURGERY | Facility: CLINIC | Age: 54
End: 2019-08-06
Payer: COMMERCIAL

## 2019-08-06 ENCOUNTER — OFFICE VISIT (OUTPATIENT)
Dept: VASCULAR SURGERY | Facility: CLINIC | Age: 54
End: 2019-08-06
Payer: COMMERCIAL

## 2019-08-06 DIAGNOSIS — Z53.9 ERRONEOUS ENCOUNTER--DISREGARD: Primary | ICD-10-CM

## 2019-08-06 PROCEDURE — 99213 OFFICE O/P EST LOW 20 MIN: CPT | Performed by: SURGERY

## 2019-08-06 PROCEDURE — 93971 EXTREMITY STUDY: CPT | Performed by: SURGERY

## 2019-08-06 NOTE — PROGRESS NOTES
[facility] Consultation Note    HPI:  Oscar Greenberg is a 53 year old male who was [facility] in [facility] for follow-up regarding bilateral lower extremity varicose veins with swelling and pain, C3 disease.  I had seen fani back in [place] in April 2019 at that time had recommended compression therapy.  He did schedule a follow-up appointment with me in 3 months with a venous incompetency study to evaluate distribution of disease and then consider treatment options of compression therapy was not working.  He has done compression therapy and notices some minor relief from the compression hose however he is complaining of heaviness in the left foot especially in the left lower leg with painful varicose vein that starts in the medial thigh and traverses down in the medial calf.  He is concerned because he has to elevate his legs at the end of the day for relief and the symptoms seem to be worsening.  Is also wondering what to do long-term about his chronic swelling.      ROS: Otherwise negative.    PHH:    Past Medical History:   Diagnosis Date     Alcohol abuse         Past Surgical History:   Procedure Laterality Date     COLONOSCOPY N/A 2/8/2019    Procedure: COMBINED COLONOSCOPY, SINGLE OR MULTIPLE BIOPSY/POLYPECTOMY BY BIOPSY;  Surgeon: Toño Escamilla DO;  Location: [facility]     ENDOSCOPIC RETROGRADE CHOLANGIOPANCREATOGRAM N/A 6/7/2016    Procedure: COMBINED ENDOSCOPIC RETROGRADE CHOLANGIOPANCREATOGRAPHY, PLACE TUBE/STENT;  Surgeon: Stephane Hunter MD;  [facility]     LAPAROSCOPIC CHOLECYSTECTOMY WITH CHOLANGIOGRAMS N/A 6/2/2016    Procedure: LAPAROSCOPIC CHOLECYSTECTOMY WITH CHOLANGIOGRAMS;  Surgeon: Darian Mcclain MD;  Location: [facility]     NO HISTORY OF SURGERY         ALLERGIES:  Nka [no known allergies]    MEDS:    Current Outpatient Medications:      IBUPROFEN PO, Take 400 mg by mouth every 8 hours as needed When needed, Disp:  , Rfl:      LORazepam (ATIVAN) 1 MG tablet, Take 0.5-1 tablets (0.5-1 mg) by mouth every 6 hours as needed for anxiety (Patient not taking: Reported on 7/25/2019), Disp: 15 tablet, Rfl: 0     omeprazole (PRILOSEC) 40 MG DR capsule, Take 1 capsule (40 mg) by mouth daily, Disp: 30 capsule, Rfl: 0     omeprazole 20 MG tablet, Take 20 mg by mouth daily as needed, Disp: , Rfl:     SOCIAL HABITS:    History   Smoking Status     Never Smoker   Smokeless Tobacco     Never Used     Social History    Substance and Sexual Activity      Alcohol use: Yes        Comment: occ.       History   Drug Use No       FAMILY HISTORY:    Family History   Problem Relation Age of Onset     Unknown/Adopted Maternal Grandmother      C.A.D. Maternal Grandfather      Unknown/Adopted Paternal Grandmother      Cancer Paternal Grandfather      Gallbladder Disease Father      Post-Traumatic Stress Disorder (PTSD) Father        PHYSICAL EXAMINATION:  Constitutional: No acute distress alert and oriented person place and time.  HEENT: PERRLA, mucous memories moist.  Chest: Nonlabored breathing.  Skin: Bilateral lower extremities are swelling at the ankles and seems more severe in the left and left calf swelling.  There are some scattered spider veins in the lateral thigh in the medial calf bilaterally.  There is a varicose vein in each leg starting in the mid thigh on the left side it traverses more significantly in the medial mid thigh across the knee joint in the proximal calf.  No evidence of phlebitis.  There are some skin changes around this area from chronic swelling and hemosiderin deposition.  Neurologic: Normal motor, sensory, gait.  Psych: Normal mood, judgment, and affect.  Pulses: Pedal pulses are palpable bilaterally    IMAGING:    Venous incompetency is defined as greater than 500 ms reflux for superficial venous reflux and greater than 1000 ms reflux for deep venous reflux.    The left common femoral femoral vein are incompetent in the  left leg.    Left greater saphenous vein is incompetent at the saphenofemoral junction.    Left accessory saphenous vein is incompetent saphenofemoral junction for 22 cm straight course where preferred to fashion becomes a superficial incompetent 9.6 cm varicose vein.  This traverses down the medial aspect of the time of the proximal calf.    The remainder of the superficial veins in the left leg are competent.        ASSESSMENT: 53-year-old male with C3 disease in the left lower extremity.    PLAN:    I discussed treatment options with Oscar given that compression was have not relieved his symptoms completely.  I also discussed with him that I think he is going to need long-term compression hose therapy given his deep venous reflux.  My plan is to perform a left accessory saphenous vein radiofrequency ablation of the 22 cm straight course and then multiple medically necessary stab phlebectomy of this large 9.6 cm varicosity off the accessory saphenous vein.  Consent was obtained and risks were discussed including DVT, greater saphenous nerve injury and numbness, 5% recurrence rate at 1 year, and pigmentation change.  He also has a cluster of spider veins in both legs that he inquired about treatment I did offer him sclerotherapy but this is cosmetic and would require out-of-pocket payment.  He will think about this after we treat this vein and consider sclerotherapy in the future for spider veins.      Vincent Alfaro MD  Vascular Surgery

## 2019-11-06 ENCOUNTER — HEALTH MAINTENANCE LETTER (OUTPATIENT)
Age: 54
End: 2019-11-06

## 2019-12-10 ENCOUNTER — IMMUNIZATION (OUTPATIENT)
Dept: FAMILY MEDICINE | Facility: CLINIC | Age: 54
End: 2019-12-10
Payer: COMMERCIAL

## 2019-12-10 PROCEDURE — 90682 RIV4 VACC RECOMBINANT DNA IM: CPT

## 2019-12-10 PROCEDURE — 90471 IMMUNIZATION ADMIN: CPT

## 2020-11-11 ENCOUNTER — VIRTUAL VISIT (OUTPATIENT)
Dept: FAMILY MEDICINE | Facility: OTHER | Age: 55
End: 2020-11-11

## 2020-11-11 NOTE — PROGRESS NOTES
"Date: 2020 12:02:54  Clinician: Pema Castle  Clinician NPI: [id number]  Patient: Oscar Greenberg  Patient : 1965  Patient Address: [address] [place], [place] [address]  Patient Phone: [phone]  Visit Protocol: URI  Patient Summary:  Oscar is a 55 year old ( : 1965 ) male who initiated a [facility] Visit for COVID-19 (Coronavirus) evaluation and screening. When asked the question \"Please sign me up to receive news, health information and promotions. \", Oscar responded \"No\".    Oscar states his symptoms started gradually 7-9 days ago. After his symptoms started, they improved and then got worse again.   His symptoms consist of vomiting, rhinitis, myalgia, chills, malaise, a sore throat, diarrhea, a headache, enlarged lymph nodes, a cough, and nausea. He is experiencing difficulty breathing due to nasal congestion but he is not short of breath. Oscar also feels feverish.   Symptom details     Nasal secretions: The color of his mucus is white.    Cough: Oscar coughs every 5-10 minutes and his cough is more bothersome at night. Phlegm comes into his throat when he coughs. He does not believe his cough is caused by post-nasal drip. The color of the phlegm is clear.     Sore throat: Oscar reports having moderate throat pain (4-6 on a 10 point pain scale), does not have exudate on his tonsils, and can swallow liquids. The lymph nodes in his neck are enlarged. A rash has not appeared on the skin since the sore throat started.     Temperature: His current temperature is 99.4 degrees Fahrenheit.     Headache: He states the headache is moderate (4-6 on a 10 point pain scale).      Oscar denies having facial pain or pressure, teeth pain, ageusia, ear pain, wheezing, nasal congestion, and anosmia. He also denies taking antibiotic medication in the past month, having recent facial or sinus surgery in the past 60 days, and having a sinus infection within the past year.   Precipitating events  Within " the past week, Oscar has not been exposed to someone with strep throat. He has not recently been exposed to someone with influenza. Oscar has not been in close contact with any high risk individuals.   Pertinent COVID-19 (Coronavirus) information  Oscar does not work or volunteer as healthcare worker or a . In the past 14 days, Oscar has not worked or volunteered at a healthcare facility or group living setting.   In the past 14 days, he also has not lived in a congregate living setting.   Oscar has not had a close contact with a laboratory-confirmed COVID-19 patient within 14 days of symptom onset.    Since December 2019, Oscar has not been tested for COVID-19 and has had upper respiratory infection (URI) or influenza-like illness.      Date(s) of previous URI or influenza-like illness (free-text): 2019     Symptoms Oscar experienced during previous URI or influenza-like illness as reported by the patient (free-text): General flu symptoms        Pertinent medical history  Oscar does not need a return to work/school note.   Weight: 270 lbs   Oscar does not smoke or use smokeless tobacco.   Weight: 270 lbs    MEDICATIONS: No current medications, ALLERGIES: NKDA  Clinician Response:  Dear Oscar,  Based on the information provided, you have a viral upper respiratory infection, otherwise known as a cold. Symptoms vary from person to person, but can include sneezing, coughing, a runny nose, sore throat, and headache and range from mild to severe.  Unfortunately, there are no medications that can cure a cold, so treatment is focused on controlling symptoms as much as possible. Most people gradually feel better until symptoms are gone in 1-2 weeks.  Medication information  Because you have a viral infection, antibiotics will not help you get better. Treating a viral infection with antibiotics could actually make you feel worse.  Self care  Steps you can take to be as comfortable as possible:     Rest.    Drink plenty  of fluids.    Take a warm shower to loosen congestion    Use a cool-mist humidifier.    Use throat lozenges.    Suck on frozen items such as popsicles.    Drink hot tea with lemon and honey.    Gargle with warm salt water (1/4 teaspoon of salt per 8 ounce glass of water).    Take a spoonful of honey to reduce your cough.     When to seek care  Please be seen in a clinic or urgent care if any of the following occur:   New symptoms develop, or symptoms become worse   Call ahead before going to the clinic or urgent care.  Call 911 or go to the emergency room if you feel that your throat is closing off, you suddenly develop a rash, you are unable to swallow fluids, you are drooling, or you are having difficulty breathing.  Additional treatment plan    Your symptoms show that you may have coronavirus (COVID-19). This illness can cause fever, cough and trouble breathing. Many people get a mild case and get better on their own. Some people can get very sick.  Based on the symptoms you have shared, I would like you to be re-checked in 2 to 3 days. Please call your family clinic to set up a video or phone visit.  Will I be tested for COVID-19?  We would like to test you for this virus.   Please call [phone] to schedule your visit. Explain that you were referred by [facility] to have a COVID-19 test. Be ready to share your [facility] visit ID number.   * If you need to schedule in [place] or [facility] please call [phone] or for [employer] employees please call [phone].    The following will serve as your written order for this COVID Test, ordered by me, for the indication of suspected COVID [Z20.828]: The test will be ordered in [employer] [facility], our electronic health record, after you are scheduled. It will show as ordered and authorized by Brayden Marx MD.  Order: COVID-19 (Coronavirus) PCR for SYMPTOMATIC testing from [facility].   1.When it's time for your COVID test:   Stay at least 6 feet away from others. (If someone will  "drive you to your test, stay in the backseat, as far away from the  as you can.)   Cover your mouth and nose with a mask, tissue or washcloth.  Go straight to the testing site. Don't make any stops on the way there or back.      2.Starting now: Stay home and away from others (self-isolate) until:   You've had no fever---and no medicine that reduces fever---for one full day (24 hours). And...   Your other symptoms have gotten better. For example, your cough or breathing has improved. And...   At least 10 days have passed since your symptoms started.       During this time, don't leave the house except for testing or medical care.   Stay in your own room, even for meals. Use your own bathroom if you can.   Stay away from others in your home. No hugging, kissing or shaking hands. No visitors.  Don't go to work, school or anywhere else.    Clean \"high touch\" surfaces often (doorknobs, counters, handles, etc.). Use a household cleaning spray or wipes. You'll find a full list of  on the EPA website: www.epa.gov/pesticide-registration/list-n-disinfectants-use-against-sars-cov-2.   Cover your mouth and nose with a mask, tissue or washcloth to avoid spreading germs.  Wash your hands and face often. Use soap and water.  Caregivers in these groups are at risk for severe illness due to COVID-19:  o People 65 years and older  o People who live in a nursing home or long-term care facility  o People with chronic disease (lung, heart, cancer, diabetes, kidney, liver, immunologic)   o People who have a weakened immune system, including those who:   Are in cancer treatment  Take medicine that weakens the immune system, such as corticosteroids  Had a bone marrow or organ transplant  Have an immune deficiency  Have poorly controlled HIV or AIDS  Are obese (body mass index of 40 or higher)  Smoke regularly   o Caregivers should wear gloves while washing dishes, handling laundry and cleaning bedrooms and bathrooms.  o Use " caution when washing and drying laundry: Don't shake dirty laundry, and use the warmest water setting that you can.  o For more tips, go to www.cdc.gov/coronavirus/2019-ncov/downloads/10Things.pdf.      How can I take care of myself?   Get lots of rest. Drink extra fluids (unless a doctor has told you not to)   Take Tylenol (acetaminophen) for fever or pain. If you have liver or kidney problems, ask your family doctor if it's okay to take Tylenol.   Adults can take either:    650 mg (two 325 mg pills) every 4 to 6 hours, or...   1,000 mg (two 500 mg pills) every 8 hours as needed.    Note: Don't take more than 3,000 mg in one day. Acetaminophen is found in many medicines (both prescribed and over-the-counter medicines). Read all labels to be sure you don't take too much.   For children, check the Tylenol bottle for the right dose. The dose is based on the child's age or weight.    If you have other health problems (like cancer, heart failure, an organ transplant or severe kidney disease): Call your specialty clinic if you don't feel better in the next 2 days.       Know when to call 911. Emergency warning signs include:    Trouble breathing or shortness of breath Pain or pressure in the chest that doesn't go away Feeling confused like you haven't felt before, or not being able to wake up Bluish-colored lips or face  Where can I get more information?   [facility] -- About COVID-19: [email or web address]   CDC -- What to Do If You're Sick: www.cdc.gov/coronavirus/2019-ncov/about/steps-when-sick.html   CDC -- Ending Home Isolation: www.cdc.gov/coronavirus/2019-ncov/hcp/disposition-in-home-patients.html   CDC -- Caring for Someone: www.cdc.gov/coronavirus/2019-ncov/if-you-are-sick/care-for-someone.html   [facility] -- Interim Guidance for Hospital Discharge to Home: www.health.[facility].[place]/diseases/coronavirus/hcp/hospdischarge.pdf   [facility] clinical trials (COVID-19 research studies):  clinicalaffairs.[facility]-clinical-trials    Below are the COVID-19 hotlines at the [place] [facility] ([facility]). Interpreters are available.    For health questions: Call 147-161-0246 or 1-333.575.2313 (7 a.m. to 7 p.m.) For questions about schools and childcare: Call 258-470-5617 or 1-499.942.9787 (7 a.m. to 7 p.m.)         Diagnosis: Contact with and (suspected) exposure to other viral communicable diseases  Diagnosis ICD: Z20.828

## 2020-11-12 ENCOUNTER — HOSPITAL ENCOUNTER (EMERGENCY)
Facility: CLINIC | Age: 55
Discharge: HOME | End: 2020-11-12
Attending: EMERGENCY MEDICINE | Admitting: EMERGENCY MEDICINE
Payer: COMMERCIAL

## 2020-11-12 VITALS
TEMPERATURE: 98.1 F | RESPIRATION RATE: 20 BRPM | HEIGHT: 74 IN | DIASTOLIC BLOOD PRESSURE: 73 MMHG | BODY MASS INDEX: 34.65 KG/M2 | WEIGHT: 270 LBS | HEART RATE: 91 BPM | OXYGEN SATURATION: 96 % | SYSTOLIC BLOOD PRESSURE: 106 MMHG

## 2020-11-12 DIAGNOSIS — R74.8 ELEVATED LIVER ENZYMES: ICD-10-CM

## 2020-11-12 DIAGNOSIS — R11.10 VOMITING AND DIARRHEA: ICD-10-CM

## 2020-11-12 DIAGNOSIS — E87.6 HYPOKALEMIA: ICD-10-CM

## 2020-11-12 DIAGNOSIS — R19.7 VOMITING AND DIARRHEA: ICD-10-CM

## 2020-11-12 DIAGNOSIS — F10.20 ALCOHOL USE DISORDER, MODERATE, DEPENDENCE (H): ICD-10-CM

## 2020-11-12 LAB
ALBUMIN SERPL-MCNC: 3.5 G/DL (ref 3.4–5)
ALP SERPL-CCNC: 177 U/L (ref 40–150)
ALT SERPL W P-5'-P-CCNC: 92 U/L (ref 0–70)
ANION GAP SERPL CALCULATED.3IONS-SCNC: 12 MMOL/L (ref 3–14)
AST SERPL W P-5'-P-CCNC: 143 U/L (ref 0–45)
BASE EXCESS BLDV CALC-SCNC: 6.6 MMOL/L
BASOPHILS # BLD AUTO: 0.1 10E9/L (ref 0–0.2)
BASOPHILS NFR BLD AUTO: 0.7 %
BILIRUB SERPL-MCNC: 2.3 MG/DL (ref 0.2–1.3)
BUN SERPL-MCNC: 6 MG/DL (ref 7–30)
CALCIUM SERPL-MCNC: 9.1 MG/DL (ref 8.5–10.1)
CHLORIDE SERPL-SCNC: 95 MMOL/L (ref 94–109)
CO2 SERPL-SCNC: 28 MMOL/L (ref 20–32)
CREAT SERPL-MCNC: 0.8 MG/DL (ref 0.66–1.25)
DIFFERENTIAL METHOD BLD: ABNORMAL
EOSINOPHIL # BLD AUTO: 0 10E9/L (ref 0–0.7)
EOSINOPHIL NFR BLD AUTO: 0.4 %
ERYTHROCYTE [DISTWIDTH] IN BLOOD BY AUTOMATED COUNT: 13.8 % (ref 10–15)
GFR SERPL CREATININE-BSD FRML MDRD: >90 ML/MIN/{1.73_M2}
GLUCOSE SERPL-MCNC: 140 MG/DL (ref 70–99)
HCO3 BLDV-SCNC: 31 MMOL/L (ref 21–28)
HCT VFR BLD AUTO: 47.1 % (ref 40–53)
HGB BLD-MCNC: 15.8 G/DL (ref 13.3–17.7)
IMM GRANULOCYTES # BLD: 0 10E9/L (ref 0–0.4)
IMM GRANULOCYTES NFR BLD: 0.4 %
LIPASE SERPL-CCNC: 186 U/L (ref 73–393)
LYMPHOCYTES # BLD AUTO: 1.4 10E9/L (ref 0.8–5.3)
LYMPHOCYTES NFR BLD AUTO: 13 %
MCH RBC QN AUTO: 36.4 PG (ref 26.5–33)
MCHC RBC AUTO-ENTMCNC: 33.5 G/DL (ref 31.5–36.5)
MCV RBC AUTO: 109 FL (ref 78–100)
MONOCYTES # BLD AUTO: 1.1 10E9/L (ref 0–1.3)
MONOCYTES NFR BLD AUTO: 9.9 %
NEUTROPHILS # BLD AUTO: 8.2 10E9/L (ref 1.6–8.3)
NEUTROPHILS NFR BLD AUTO: 75.6 %
NRBC # BLD AUTO: 0 10*3/UL
NRBC BLD AUTO-RTO: 0 /100
PCO2 BLDV: 42 MM HG (ref 40–50)
PH BLDV: 7.47 PH (ref 7.32–7.43)
PLATELET # BLD AUTO: 225 10E9/L (ref 150–450)
PO2 BLDV: 22 MM HG (ref 25–47)
POTASSIUM SERPL-SCNC: 3.1 MMOL/L (ref 3.4–5.3)
PROT SERPL-MCNC: 9 G/DL (ref 6.8–8.8)
RBC # BLD AUTO: 4.34 10E12/L (ref 4.4–5.9)
SODIUM SERPL-SCNC: 135 MMOL/L (ref 133–144)
WBC # BLD AUTO: 10.9 10E9/L (ref 4–11)

## 2020-11-12 PROCEDURE — 250N000013 HC RX MED GY IP 250 OP 250 PS 637: Performed by: EMERGENCY MEDICINE

## 2020-11-12 PROCEDURE — 258N000003 HC RX IP 258 OP 636: Performed by: EMERGENCY MEDICINE

## 2020-11-12 PROCEDURE — 96374 THER/PROPH/DIAG INJ IV PUSH: CPT | Performed by: EMERGENCY MEDICINE

## 2020-11-12 PROCEDURE — 80053 COMPREHEN METABOLIC PANEL: CPT | Performed by: EMERGENCY MEDICINE

## 2020-11-12 PROCEDURE — 96361 HYDRATE IV INFUSION ADD-ON: CPT | Performed by: EMERGENCY MEDICINE

## 2020-11-12 PROCEDURE — 83690 ASSAY OF LIPASE: CPT | Performed by: EMERGENCY MEDICINE

## 2020-11-12 PROCEDURE — U0003 INFECTIOUS AGENT DETECTION BY NUCLEIC ACID (DNA OR RNA); SEVERE ACUTE RESPIRATORY SYNDROME CORONAVIRUS 2 (SARS-COV-2) (CORONAVIRUS DISEASE [COVID-19]), AMPLIFIED PROBE TECHNIQUE, MAKING USE OF HIGH THROUGHPUT TECHNOLOGIES AS DESCRIBED BY CMS-2020-01-R: HCPCS | Performed by: EMERGENCY MEDICINE

## 2020-11-12 PROCEDURE — 85025 COMPLETE CBC W/AUTO DIFF WBC: CPT | Performed by: EMERGENCY MEDICINE

## 2020-11-12 PROCEDURE — 99284 EMERGENCY DEPT VISIT MOD MDM: CPT | Mod: 25 | Performed by: EMERGENCY MEDICINE

## 2020-11-12 PROCEDURE — 250N000011 HC RX IP 250 OP 636: Performed by: EMERGENCY MEDICINE

## 2020-11-12 PROCEDURE — C9803 HOPD COVID-19 SPEC COLLECT: HCPCS | Performed by: EMERGENCY MEDICINE

## 2020-11-12 PROCEDURE — 99284 EMERGENCY DEPT VISIT MOD MDM: CPT | Performed by: EMERGENCY MEDICINE

## 2020-11-12 PROCEDURE — 82803 BLOOD GASES ANY COMBINATION: CPT | Performed by: EMERGENCY MEDICINE

## 2020-11-12 RX ORDER — ACETAMINOPHEN 500 MG
1000 TABLET ORAL ONCE
Status: COMPLETED | OUTPATIENT
Start: 2020-11-12 | End: 2020-11-12

## 2020-11-12 RX ORDER — POTASSIUM CHLORIDE 1.5 G/1.58G
60 POWDER, FOR SOLUTION ORAL ONCE
Status: DISCONTINUED | OUTPATIENT
Start: 2020-11-12 | End: 2020-11-12

## 2020-11-12 RX ORDER — SODIUM CHLORIDE 9 MG/ML
INJECTION, SOLUTION INTRAVENOUS CONTINUOUS
Status: DISCONTINUED | OUTPATIENT
Start: 2020-11-12 | End: 2020-11-13 | Stop reason: HOSPADM

## 2020-11-12 RX ORDER — ONDANSETRON 4 MG/1
4 TABLET, ORALLY DISINTEGRATING ORAL EVERY 8 HOURS PRN
Qty: 7 TABLET | Refills: 0 | Status: SHIPPED | OUTPATIENT
Start: 2020-11-12 | End: 2021-10-25

## 2020-11-12 RX ORDER — ONDANSETRON 2 MG/ML
4 INJECTION INTRAMUSCULAR; INTRAVENOUS ONCE
Status: COMPLETED | OUTPATIENT
Start: 2020-11-12 | End: 2020-11-12

## 2020-11-12 RX ORDER — POTASSIUM CHLORIDE 1.5 G/1.58G
20 POWDER, FOR SOLUTION ORAL ONCE
Status: COMPLETED | OUTPATIENT
Start: 2020-11-12 | End: 2020-11-12

## 2020-11-12 RX ORDER — POTASSIUM CHLORIDE 1.5 G/1.58G
40 POWDER, FOR SOLUTION ORAL ONCE
Status: COMPLETED | OUTPATIENT
Start: 2020-11-12 | End: 2020-11-12

## 2020-11-12 RX ADMIN — ACETAMINOPHEN 1000 MG: 500 TABLET, FILM COATED ORAL at 20:12

## 2020-11-12 RX ADMIN — POTASSIUM CHLORIDE 40 MEQ: 1.5 POWDER, FOR SOLUTION ORAL at 22:05

## 2020-11-12 RX ADMIN — POTASSIUM CHLORIDE 20 MEQ: 1.5 POWDER, FOR SOLUTION ORAL at 22:52

## 2020-11-12 RX ADMIN — SODIUM CHLORIDE 1000 ML: 9 INJECTION, SOLUTION INTRAVENOUS at 20:11

## 2020-11-12 RX ADMIN — ONDANSETRON 4 MG: 2 INJECTION INTRAMUSCULAR; INTRAVENOUS at 20:11

## 2020-11-12 ASSESSMENT — ENCOUNTER SYMPTOMS
FEVER: 1
ENDOCRINE NEGATIVE: 1
HEMATOLOGIC/LYMPHATIC NEGATIVE: 1
EYES NEGATIVE: 1
NAUSEA: 1
VOMITING: 1
CHILLS: 1
CARDIOVASCULAR NEGATIVE: 1
PSYCHIATRIC NEGATIVE: 1
COUGH: 1
MUSCULOSKELETAL NEGATIVE: 1
NEUROLOGICAL NEGATIVE: 1
ALLERGIC/IMMUNOLOGIC NEGATIVE: 1
DIARRHEA: 1

## 2020-11-12 ASSESSMENT — MIFFLIN-ST. JEOR: SCORE: 2139.46

## 2020-11-12 NOTE — ED AVS SNAPSHOT
[facility]  [address] [address]  Phone: [phone]  Fax: [phone]                                    Oscar Greenberg   MRN: [id number]    Department: [facility]   Date of Visit: 11/12/2020           After Visit Summary Signature Page    I have received my discharge instructions, and my questions have been answered. I have discussed any challenges I see with this plan with the nurse or doctor.    ..........................................................................................................................................  Patient/Patient Representative Signature      ..........................................................................................................................................  Patient Representative Print Name and Relationship to Patient    ..................................................               ................................................  Date                                   Time    ..........................................................................................................................................  Reviewed by Signature/Title    ...................................................              ..............................................  Date                                               Time          22EPIC Rev 08/18

## 2020-11-13 NOTE — ED NOTES
Pt arrives with main concerns for nausea and vomiting for the last 4 days. He states that he cannot keep anything down. He has had fevers. He has had some SOB today but states that has been mild. He has had body aches. He has been sleepy.

## 2020-11-13 NOTE — ED PROVIDER NOTES
History     Chief Complaint   Patient presents with     Cough     since sunday, no eating x 4 days.      Fever     HPI  Oscar Greenberg is a 55 year old male who presents with report of cough, fever, nausea, headache and sensation of fogginess.  Patient arrived by car from home.  Patient reports history of alcohol dependence.  Patient reports his last drink was yesterday-November 11, 2020.  He reports a drink of choice is vodka.  Patient reports nausea and vomiting unable to keep anything down and has had nonbloody diarrhea over the last 4 days.  Patient  reports fever and chills and feeling unwell and reports a non-productive cough.  He lives at home with his wife who is asymptomatic.  Patient reports no exposure to anyone with COVID-19. Patient has a history of colonic polyps, peripheral venous insufficiency, contact dermatitis, history of elevated liver enzymes, history of thrombophlebitis involving the saphenous vein,  Patient is prescribed omeprazole, and reports ibuprofen use.  No other medications.    Allergies:  Allergies   Allergen Reactions     Nka [No Known Allergies]        Problem List:    Patient Active Problem List    Diagnosis Date Noted     Adjustment disorder with anxious mood 07/22/2019     Priority: Medium     Venous (peripheral) insufficiency 04/09/2019     Priority: Medium     Colon polyps 02/13/2019     Priority: Medium     FINAL DIAGNOSIS:   A.  Right colon, mucosal biopsy:   - Tubular adenoma.   - Negative for high-grade dysplasia and malignancy.     B.  Right colon, mucosal biopsies:   - Several fragments of tubular adenoma.   - Negative for high-grade dysplasia and malignancy     C.  Sigmoid colon, mucosal biopsies:   - Tubular adenoma and hyperplastic polyp.   - Negative for high-grade dysplasia and malignancy.        Bacteremia 05/25/2016     Priority: Medium     Alcoholic ketoacidosis 05/16/2016     Priority: Medium     Abdominal pain 05/16/2016     Priority: Medium     Screening for  hypertension 05/09/2016     Priority: Medium     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     Priority: Medium     GERD (gastroesophageal reflux disease) 10/23/2008     Priority: Medium     ALCOH USE 01/11/2007     Priority: Medium     500 ML per day in Tom o7  Chem dep eval recommended       Contact dermatitis and other eczema, due to unspecified cause 01/11/2007     Priority: Medium     Nonspecific elevation of levels of transaminase or lactic acid dehydrogenase (LDH) 01/11/2007     Priority: Medium     Suspect etoh related          Past Medical History:    Past Medical History:   Diagnosis Date     Alcohol abuse        Past Surgical History:    Past Surgical History:   Procedure Laterality Date     COLONOSCOPY N/A 2/8/2019    Procedure: COMBINED COLONOSCOPY, SINGLE OR MULTIPLE BIOPSY/POLYPECTOMY BY BIOPSY;  Surgeon: Toño Escamilla DO;  Location: [facility]     ENDOSCOPIC RETROGRADE CHOLANGIOPANCREATOGRAM N/A 6/7/2016    Procedure: COMBINED ENDOSCOPIC RETROGRADE CHOLANGIOPANCREATOGRAPHY, PLACE TUBE/STENT;  Surgeon: Stephane Hunter MD;  Location: [facility]     LAPAROSCOPIC CHOLECYSTECTOMY WITH CHOLANGIOGRAMS N/A 6/2/2016    Procedure: LAPAROSCOPIC CHOLECYSTECTOMY WITH CHOLANGIOGRAMS;  Surgeon: Darian Mcclain MD;  Location: [facility]     NO HISTORY OF SURGERY         Family History:    Family History   Problem Relation Age of Onset     Unknown/Adopted Maternal Grandmother      C.A.D. Maternal Grandfather      Unknown/Adopted Paternal Grandmother      Cancer Paternal Grandfather      Gallbladder Disease Father      Post-Traumatic Stress Disorder (PTSD) Father        Social History:  Marital Status:   [2]  Social History     Tobacco Use     Smoking status: Never Smoker     Smokeless tobacco: Never Used   Substance Use Topics     Alcohol use: Yes     Comment: occ.      Drug use: No        Medications:         IBUPROFEN  "PO       LORazepam (ATIVAN) 1 MG tablet       omeprazole (PRILOSEC) 40 MG DR capsule       omeprazole 20 MG tablet          Review of Systems   Constitutional: Positive for chills and fever.   HENT: Negative.    Eyes: Negative.    Respiratory: Positive for cough.    Cardiovascular: Negative.    Gastrointestinal: Positive for diarrhea, nausea and vomiting.   Endocrine: Negative.    Genitourinary: Negative.    Musculoskeletal: Negative.    Skin: Negative.    Allergic/Immunologic: Negative.    Neurological: Negative.    Hematological: Negative.    Psychiatric/Behavioral: Negative.    All other systems reviewed and are negative.      Physical Exam   BP: (!) 148/87  Pulse: 111  Temp: 98.1  F (36.7  C)  Resp: 20  Height: 188 cm (6' 2\")  Weight: 122.5 kg (270 lb)  SpO2: 96 %      Physical Exam  Constitutional:       General: He is not in acute distress.     Appearance: He is ill-appearing. He is not toxic-appearing or diaphoretic.   HENT:      Head: Normocephalic and atraumatic.      Mouth/Throat:      Mouth: Mucous membranes are moist.   Eyes:      Extraocular Movements: Extraocular movements intact.      Pupils: Pupils are equal, round, and reactive to light.   Neck:      Musculoskeletal: Normal range of motion. No neck rigidity or muscular tenderness.      Vascular: No carotid bruit.   Cardiovascular:      Rate and Rhythm: Normal rate and regular rhythm.   Pulmonary:      Effort: Pulmonary effort is normal. No respiratory distress.      Breath sounds: Normal breath sounds. No stridor. No wheezing, rhonchi or rales.   Chest:      Chest wall: No tenderness.   Abdominal:      Tenderness: There is no abdominal tenderness. There is no guarding.   Musculoskeletal:         General: No swelling, tenderness, deformity or signs of injury.      Right lower leg: No edema.      Left lower leg: No edema.   Lymphadenopathy:      Cervical: No cervical adenopathy.   Skin:     Capillary Refill: Capillary refill takes less than 2 seconds. "      Coloration: Skin is not jaundiced or pale.      Findings: No bruising, erythema, lesion or rash.   Neurological:      General: No focal deficit present.      Mental Status: He is alert and oriented to person, place, and time.      Cranial Nerves: No cranial nerve deficit.      Sensory: No sensory deficit.      Motor: No weakness.      Coordination: Coordination normal.      Deep Tendon Reflexes: Reflexes normal.   Psychiatric:         Mood and Affect: Mood normal.         Behavior: Behavior normal.         Thought Content: Thought content normal.         Judgment: Judgment normal.         ED Course        Procedures               Critical Care time:  none               ED medications:  Medications   0.9% sodium chloride BOLUS (0 mLs Intravenous Stopped 11/12/20 2204)     Followed by   sodium chloride 0.9% infusion (has no administration in time range)   potassium chloride (KLOR-CON) Packet 20 mEq (has no administration in time range)   ondansetron (ZOFRAN) injection 4 mg (4 mg Intravenous Given 11/12/20 2011)   acetaminophen (TYLENOL) tablet 1,000 mg (1,000 mg Oral Given 11/12/20 2012)   potassium chloride (KLOR-CON) Packet 40 mEq (40 mEq Oral Given 11/12/20 2205)       ED Vitals:  Vitals:    11/12/20 2015 11/12/20 2030 11/12/20 2045 11/12/20 2100   BP: 119/81 118/77 115/69 117/70   Pulse: 98 94 94 93   Resp:       Temp:       TempSrc:       SpO2: 98% 92% 94% 90%   Weight:       Height:         ED labs and imaging:  Results for orders placed or performed during the hospital encounter of 11/12/20   CBC with platelets differential     Status: Abnormal   Result Value Ref Range    WBC 10.9 4.0 - 11.0 10e9/L    RBC Count 4.34 (L) 4.4 - 5.9 10e12/L    Hemoglobin 15.8 13.3 - 17.7 g/dL    Hematocrit 47.1 40.0 - 53.0 %     (H) 78 - 100 fl    MCH 36.4 (H) 26.5 - 33.0 pg    MCHC 33.5 31.5 - 36.5 g/dL    RDW 13.8 10.0 - 15.0 %    Platelet Count 225 150 - 450 10e9/L    Diff Method Automated Method     % Neutrophils  75.6 %    % Lymphocytes 13.0 %    % Monocytes 9.9 %    % Eosinophils 0.4 %    % Basophils 0.7 %    % Immature Granulocytes 0.4 %    Nucleated RBCs 0 0 /100    Absolute Neutrophil 8.2 1.6 - 8.3 10e9/L    Absolute Lymphocytes 1.4 0.8 - 5.3 10e9/L    Absolute Monocytes 1.1 0.0 - 1.3 10e9/L    Absolute Eosinophils 0.0 0.0 - 0.7 10e9/L    Absolute Basophils 0.1 0.0 - 0.2 10e9/L    Abs Immature Granulocytes 0.0 0 - 0.4 10e9/L    Absolute Nucleated RBC 0.0    Comprehensive metabolic panel     Status: Abnormal   Result Value Ref Range    Sodium 135 133 - 144 mmol/L    Potassium 3.1 (L) 3.4 - 5.3 mmol/L    Chloride 95 94 - 109 mmol/L    Carbon Dioxide 28 20 - 32 mmol/L    Anion Gap 12 3 - 14 mmol/L    Glucose 140 (H) 70 - 99 mg/dL    Urea Nitrogen 6 (L) 7 - 30 mg/dL    Creatinine 0.80 0.66 - 1.25 mg/dL    GFR Estimate >90 >60 mL/min/[1.73_m2]    GFR Estimate If Black >90 >60 mL/min/[1.73_m2]    Calcium 9.1 8.5 - 10.1 mg/dL    Bilirubin Total 2.3 (H) 0.2 - 1.3 mg/dL    Albumin 3.5 3.4 - 5.0 g/dL    Protein Total 9.0 (H) 6.8 - 8.8 g/dL    Alkaline Phosphatase 177 (H) 40 - 150 U/L    ALT 92 (H) 0 - 70 U/L     (H) 0 - 45 U/L   Blood gas venous     Status: Abnormal   Result Value Ref Range    Ph Venous 7.47 (H) 7.32 - 7.43 pH    PCO2 Venous 42 40 - 50 mm Hg    PO2 Venous 22 (L) 25 - 47 mm Hg    Bicarbonate Venous 31 (H) 21 - 28 mmol/L    Base Excess Venous 6.6 mmol/L   Lipase     Status: None   Result Value Ref Range    Lipase 186 73 - 393 U/L           Assessments & Plan (with Medical Decision Making)   Assessment Summary and Clinical Impression: 55-year-old male who presented with a constellation of symptoms including headache, nausea, fatigue, fogginess, diarrhea, cough and feeling unwell.  Patient has a history of alcohol use disorder and reports he is currently dependent with last drink 24 hours prior to presenting to the department for care.  Drink of choice is vodka.  Patient reports no sick household contacts and  no known exposure to COVID-19.  He reports a distant history of dysentery and that he was hospitalized for sepsis-2 years prior.  On my exam he appeared ill but not toxic he was tachycardic at rest.  Tachycardia improved with IV fluids and hydration during his ED course.  He was afebrile.  96% on room air.  Blood pressure was 148/87 protuberant abdomen without localized guarding or rebound.  Patient was stable his electrolyte Avel was repleted.  He expressed comfort going home and declined additional imaging during his ED course and plan for close outpatient follow-up with history of elevated liver enzymes in the context of history of active alcohol use.  COVID-19 test pending.  Low threshold to return to the department to be re-evaluated.      ED course and Plan:  Reviewed the medical record.  A broad differential was considered including nausea and vomiting secondary to alcohol use-alcoholic ketoacidosis/withdrawal, with report of cough and fogginess and feeling unwell COVID-19 considered. With dehydration from GI losses with report of nausea,vomiting and diarrhea he was given IV fluids and monitored on telemetry with frequent vital signs.  Patient's work-up today revealed elevated liver enzymes which is consistent with prior episodes of elevated liver enzymes which could be attributed to alcohol use disorder with moderate dependence, hypokalemia.  Potassium was 3.1.  Patient's potassium was repleted orally.  Given IV fluids and antiemetics.  He was reevaluated after period of observation.  Patient reported feeling improved.  He had no diarrhea during his ED course and reported no abdominal pain. We discussed his elevated liver enzymes in the context of his alcohol use disorder.  Patient does not recall prior imaging about the liver.  He was offered imaging tonight versus a trial of outpatient care with close follow-up with outpatient imaging.  Patient elected to go home with outpatient imaging.  We discussed  and reviewed reasons to return to the department to be reevaluated patient expressed comfort, understanding and agreement plan of care. An Epic in-basket message was sent to Dr Mccoy- PCP-to help with coordination of outpatient imaging and close outpatient follow-up for recheck in electrolytes and liver enzymes follow-up.  Given Zofran for as needed use for nausea and vomiting.  Over-the-counter remedies to help manage diarrhea was also discussed.      Disclaimer: This note consists of symbols derived from keyboarding, dictation and/or voice recognition software. As a result, there may be errors in the script that have gone undetected. Please consider this when interpreting information found in this chart.  I have reviewed the nursing notes.    I have reviewed the findings, diagnosis, plan and need for follow up with the patient.       New Prescriptions    No medications on file       Final diagnoses:   Alcohol use disorder, moderate, dependence (H) - Vodka- last drink- 24 hour prior   Vomiting and diarrhea   Hypokalemia - K+-3.1   Elevated liver enzymes - History of elevated liver enzymes       11/12/2020   [facility] EMERGENCY DEPT     Chemo Merritt MD  11/12/20 [id number]

## 2020-11-13 NOTE — DISCHARGE INSTRUCTIONS
1) Your evaluation did note that you have elevated liver enzymes which is chronic, and a low potassium.  Your potassium has been repleted.  It should be rechecked in the next few days.  You have elected to go home with plan to follow-up with ultrasound with elevated liver enzymes noted during assessment today although this appears to be in the similar range over the last few years.    2) COVID-19 test is pending.  If your test is positive you will be called by the ED follow-up nurses and notified about results.  If it is negative you may receive a mail notice.    3) You are discharge home with plan to use nausea medication as needed, drink fluids.  It is important to seek counseling and support for alcohol use.    4) Call Dr Mccoy-to schedule follow-up visit  to have a recheck of your potassium, and additional outpatient imaging as discussed with elevated liver enzymes.    5) although you appear stable for discharge to home if you develop new symptoms of concern you should return immediately to be reevaluated

## 2020-11-14 LAB
SARS-COV-2 RNA SPEC QL NAA+PROBE: NOT DETECTED
SPECIMEN SOURCE: NORMAL

## 2020-11-29 ENCOUNTER — HEALTH MAINTENANCE LETTER (OUTPATIENT)
Age: 55
End: 2020-11-29

## 2021-03-24 ENCOUNTER — HOSPITAL ENCOUNTER (EMERGENCY)
Facility: CLINIC | Age: 56
Discharge: HOME | End: 2021-03-24
Attending: NURSE PRACTITIONER | Admitting: NURSE PRACTITIONER
Payer: COMMERCIAL

## 2021-03-24 ENCOUNTER — APPOINTMENT (OUTPATIENT)
Dept: ULTRASOUND IMAGING | Facility: CLINIC | Age: 56
End: 2021-03-24
Attending: NURSE PRACTITIONER
Payer: COMMERCIAL

## 2021-03-24 ENCOUNTER — APPOINTMENT (OUTPATIENT)
Dept: GENERAL RADIOLOGY | Facility: CLINIC | Age: 56
End: 2021-03-24
Attending: NURSE PRACTITIONER
Payer: COMMERCIAL

## 2021-03-24 VITALS
TEMPERATURE: 98 F | HEART RATE: 95 BPM | OXYGEN SATURATION: 92 % | RESPIRATION RATE: 14 BRPM | WEIGHT: 260 LBS | DIASTOLIC BLOOD PRESSURE: 82 MMHG | SYSTOLIC BLOOD PRESSURE: 122 MMHG | BODY MASS INDEX: 33.38 KG/M2

## 2021-03-24 DIAGNOSIS — R74.8 ELEVATED LIVER ENZYMES: ICD-10-CM

## 2021-03-24 DIAGNOSIS — M25.50 MULTIPLE JOINT PAIN: ICD-10-CM

## 2021-03-24 LAB
ALBUMIN SERPL-MCNC: 3.3 G/DL (ref 3.4–5)
ALP SERPL-CCNC: 161 U/L (ref 40–150)
ALT SERPL W P-5'-P-CCNC: 49 U/L (ref 0–70)
ANION GAP SERPL CALCULATED.3IONS-SCNC: 10 MMOL/L (ref 3–14)
AST SERPL W P-5'-P-CCNC: 121 U/L (ref 0–45)
BASOPHILS # BLD AUTO: 0.1 10E9/L (ref 0–0.2)
BASOPHILS NFR BLD AUTO: 0.8 %
BILIRUB DIRECT SERPL-MCNC: 1.2 MG/DL (ref 0–0.2)
BILIRUB SERPL-MCNC: 2.6 MG/DL (ref 0.2–1.3)
BUN SERPL-MCNC: 6 MG/DL (ref 7–30)
CALCIUM SERPL-MCNC: 9.4 MG/DL (ref 8.5–10.1)
CHLORIDE SERPL-SCNC: 98 MMOL/L (ref 94–109)
CO2 SERPL-SCNC: 24 MMOL/L (ref 20–32)
CREAT SERPL-MCNC: 0.6 MG/DL (ref 0.66–1.25)
DIFFERENTIAL METHOD BLD: ABNORMAL
EOSINOPHIL # BLD AUTO: 0 10E9/L (ref 0–0.7)
EOSINOPHIL NFR BLD AUTO: 0.3 %
ERYTHROCYTE [DISTWIDTH] IN BLOOD BY AUTOMATED COUNT: 13.3 % (ref 10–15)
GFR SERPL CREATININE-BSD FRML MDRD: >90 ML/MIN/{1.73_M2}
GLUCOSE SERPL-MCNC: 145 MG/DL (ref 70–99)
HCT VFR BLD AUTO: 42.6 % (ref 40–53)
HGB BLD-MCNC: 14.5 G/DL (ref 13.3–17.7)
IMM GRANULOCYTES # BLD: 0 10E9/L (ref 0–0.4)
IMM GRANULOCYTES NFR BLD: 0.5 %
INR PPP: 1.17 (ref 0.86–1.14)
LYMPHOCYTES # BLD AUTO: 0.7 10E9/L (ref 0.8–5.3)
LYMPHOCYTES NFR BLD AUTO: 11.1 %
MCH RBC QN AUTO: 36.1 PG (ref 26.5–33)
MCHC RBC AUTO-ENTMCNC: 34 G/DL (ref 31.5–36.5)
MCV RBC AUTO: 106 FL (ref 78–100)
MONOCYTES # BLD AUTO: 0.7 10E9/L (ref 0–1.3)
MONOCYTES NFR BLD AUTO: 10.3 %
NEUTROPHILS # BLD AUTO: 4.9 10E9/L (ref 1.6–8.3)
NEUTROPHILS NFR BLD AUTO: 77 %
NRBC # BLD AUTO: 0 10*3/UL
NRBC BLD AUTO-RTO: 0 /100
PLATELET # BLD AUTO: 165 10E9/L (ref 150–450)
POTASSIUM SERPL-SCNC: 3.6 MMOL/L (ref 3.4–5.3)
PROT SERPL-MCNC: 8 G/DL (ref 6.8–8.8)
RBC # BLD AUTO: 4.02 10E12/L (ref 4.4–5.9)
SODIUM SERPL-SCNC: 132 MMOL/L (ref 133–144)
URATE SERPL-MCNC: 6.5 MG/DL (ref 3.5–7.2)
WBC # BLD AUTO: 6.4 10E9/L (ref 4–11)

## 2021-03-24 PROCEDURE — 250N000011 HC RX IP 250 OP 636: Performed by: NURSE PRACTITIONER

## 2021-03-24 PROCEDURE — 73070 X-RAY EXAM OF ELBOW: CPT | Mod: LT

## 2021-03-24 PROCEDURE — 73560 X-RAY EXAM OF KNEE 1 OR 2: CPT | Mod: LT

## 2021-03-24 PROCEDURE — 85025 COMPLETE CBC W/AUTO DIFF WBC: CPT | Performed by: NURSE PRACTITIONER

## 2021-03-24 PROCEDURE — 96374 THER/PROPH/DIAG INJ IV PUSH: CPT | Performed by: NURSE PRACTITIONER

## 2021-03-24 PROCEDURE — 93971 EXTREMITY STUDY: CPT | Mod: LT

## 2021-03-24 PROCEDURE — 96375 TX/PRO/DX INJ NEW DRUG ADDON: CPT | Performed by: NURSE PRACTITIONER

## 2021-03-24 PROCEDURE — 84550 ASSAY OF BLOOD/URIC ACID: CPT | Performed by: NURSE PRACTITIONER

## 2021-03-24 PROCEDURE — 250N000012 HC RX MED GY IP 250 OP 636 PS 637: Performed by: NURSE PRACTITIONER

## 2021-03-24 PROCEDURE — 85610 PROTHROMBIN TIME: CPT | Performed by: NURSE PRACTITIONER

## 2021-03-24 PROCEDURE — 99285 EMERGENCY DEPT VISIT HI MDM: CPT | Mod: 25 | Performed by: NURSE PRACTITIONER

## 2021-03-24 PROCEDURE — 80076 HEPATIC FUNCTION PANEL: CPT | Performed by: NURSE PRACTITIONER

## 2021-03-24 PROCEDURE — 96361 HYDRATE IV INFUSION ADD-ON: CPT | Performed by: NURSE PRACTITIONER

## 2021-03-24 PROCEDURE — 99285 EMERGENCY DEPT VISIT HI MDM: CPT | Performed by: NURSE PRACTITIONER

## 2021-03-24 PROCEDURE — 258N000003 HC RX IP 258 OP 636: Performed by: NURSE PRACTITIONER

## 2021-03-24 PROCEDURE — 80048 BASIC METABOLIC PNL TOTAL CA: CPT | Performed by: NURSE PRACTITIONER

## 2021-03-24 RX ORDER — SODIUM CHLORIDE, SODIUM LACTATE, POTASSIUM CHLORIDE, CALCIUM CHLORIDE 600; 310; 30; 20 MG/100ML; MG/100ML; MG/100ML; MG/100ML
INJECTION, SOLUTION INTRAVENOUS CONTINUOUS
Status: DISCONTINUED | OUTPATIENT
Start: 2021-03-24 | End: 2021-03-24 | Stop reason: HOSPADM

## 2021-03-24 RX ORDER — PREDNISONE 20 MG/1
TABLET ORAL
Qty: 10 TABLET | Refills: 0 | Status: SHIPPED | OUTPATIENT
Start: 2021-03-24 | End: 2021-10-25

## 2021-03-24 RX ORDER — LORAZEPAM 2 MG/ML
0.5 INJECTION INTRAMUSCULAR ONCE
Status: COMPLETED | OUTPATIENT
Start: 2021-03-24 | End: 2021-03-24

## 2021-03-24 RX ORDER — PREDNISONE 20 MG/1
60 TABLET ORAL ONCE
Status: COMPLETED | OUTPATIENT
Start: 2021-03-24 | End: 2021-03-24

## 2021-03-24 RX ORDER — ONDANSETRON 2 MG/ML
4 INJECTION INTRAMUSCULAR; INTRAVENOUS EVERY 30 MIN PRN
Status: DISCONTINUED | OUTPATIENT
Start: 2021-03-24 | End: 2021-03-24 | Stop reason: HOSPADM

## 2021-03-24 RX ADMIN — SODIUM CHLORIDE, POTASSIUM CHLORIDE, SODIUM LACTATE AND CALCIUM CHLORIDE 1000 ML: 600; 310; 30; 20 INJECTION, SOLUTION INTRAVENOUS at 09:59

## 2021-03-24 RX ADMIN — LORAZEPAM 0.5 MG: 2 INJECTION INTRAMUSCULAR; INTRAVENOUS at 10:05

## 2021-03-24 RX ADMIN — ONDANSETRON 4 MG: 2 INJECTION INTRAMUSCULAR; INTRAVENOUS at 10:05

## 2021-03-24 RX ADMIN — PREDNISONE 60 MG: 20 TABLET ORAL at 11:38

## 2021-03-24 NOTE — ED PROVIDER NOTES
History     Chief Complaint   Patient presents with     Knee left     increased left leg and knee pain, onset this am, no back pain     HPI  Oscar Greenberg is a 55 year old male with past medical history remarkable for GERD, alcohol use, acute cholecystitis with subsequent sequela of sepsis and abdominal abscess who presents to the emergency room with reports of left elbow and left knee pain.  Patient reports that he has a history of bilateral knee, hip, ankle, and elbow pain intermittently off and on for the past 2-3 years  No work up has been completed  Patient reports he has never been seen for this before    Today pt reports 2-3 day hx of left knee, left elbow pain  Describes pain as aching throbbing pain  He states the pain is constant with exacerbations  Rate pain 05/10 at best and 10/10 at worst  Patient states that twisting and ambulating aggravates the pain  Patient reports that rest and no movement is relieving but not resolving  Pt has tried ice and reports icing it all the time  Denies taking any medications to treat his symptoms  Reports decreased appetite, loose stools and intermittent nausea over the past 2-3 days as well and unsure if the symptoms are related.  No other associated symptoms  No hx of rheumatoid disorder or psoriatic arthritis and denies any autoimmune disease illness history    Patient does admit to daily alcohol use and states that he has been cutting down and currently admits to 2 drinks daily vodka - regular   Non smoker  No THC  No recreational drugs    Patient denies any fevers, aches, chills, mental status changes, rashes, trauma within the past 3 to 4 days.  Patient denies any thoughts of harming himself.  Patient denying any ear nose or throat pain or abdominal pain or dysuria or hematuria or rectal bleeding.    Allergies:  Allergies   Allergen Reactions     Nka [No Known Allergies]        Problem List:    Patient Active Problem List    Diagnosis Date Noted     Adjustment  disorder with anxious mood 07/22/2019     Priority: Medium     Venous (peripheral) insufficiency 04/09/2019     Priority: Medium     Colon polyps 02/13/2019     Priority: Medium     FINAL DIAGNOSIS:   A.  Right colon, mucosal biopsy:   - Tubular adenoma.   - Negative for high-grade dysplasia and malignancy.     B.  Right colon, mucosal biopsies:   - Several fragments of tubular adenoma.   - Negative for high-grade dysplasia and malignancy     C.  Sigmoid colon, mucosal biopsies:   - Tubular adenoma and hyperplastic polyp.   - Negative for high-grade dysplasia and malignancy.        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     Priority: Medium     GERD (gastroesophageal reflux disease) 10/23/2008     Priority: Medium     ALCOH USE 01/11/2007     Priority: Medium     500 ML per day in Jan o7  Chem dep eval recommended       Contact dermatitis and other eczema, due to unspecified cause 01/11/2007     Priority: Medium     Nonspecific elevation of levels of transaminase or lactic acid dehydrogenase (LDH) 01/11/2007     Priority: Medium     Suspect etoh related          Past Medical History:    Past Medical History:   Diagnosis Date     Alcohol abuse        Past Surgical History:    Past Surgical History:   Procedure Laterality Date     COLONOSCOPY N/A 2/8/2019    Procedure: COMBINED COLONOSCOPY, SINGLE OR MULTIPLE BIOPSY/POLYPECTOMY BY BIOPSY;  Surgeon: Toño Escamilla DO;  Location: [facility]     ENDOSCOPIC RETROGRADE CHOLANGIOPANCREATOGRAM N/A 6/7/2016    Procedure: COMBINED ENDOSCOPIC RETROGRADE CHOLANGIOPANCREATOGRAPHY, PLACE TUBE/STENT;  Surgeon: Stephane Hunter MD;  Location:  OR     LAPAROSCOPIC CHOLECYSTECTOMY WITH CHOLANGIOGRAMS N/A 6/2/2016    Procedure: LAPAROSCOPIC CHOLECYSTECTOMY WITH CHOLANGIOGRAMS;  Surgeon: Darian Mcclain MD;  Location: [facility]     NO HISTORY OF SURGERY         Family History:    Family  History   Problem Relation Age of Onset     Unknown/Adopted Maternal Grandmother      C.A.D. Maternal Grandfather      Unknown/Adopted Paternal Grandmother      Cancer Paternal Grandfather      Gallbladder Disease Father      Post-Traumatic Stress Disorder (PTSD) Father        Social History:  Marital Status:   [2]  Social History     Tobacco Use     Smoking status: Never Smoker     Smokeless tobacco: Never Used   Substance Use Topics     Alcohol use: Yes     Comment: occ.      Drug use: No        Medications:    predniSONE (DELTASONE) 20 MG tablet  IBUPROFEN PO  LORazepam (ATIVAN) 1 MG tablet  omeprazole (PRILOSEC) 40 MG DR capsule  ondansetron (ZOFRAN ODT) 4 MG ODT tab      Review of Systems  As mentioned above in the history present illness. All other systems were reviewed and are negative.    Physical Exam   BP: 130/85  Pulse: 104  Temp: 98  F (36.7  C)  Resp: 14  Weight: 117.9 kg (260 lb)  SpO2: 100 %      Physical Exam  Vitals signs and nursing note reviewed.   Constitutional:       General: He is not in acute distress.     Appearance: He is well-developed. He is not diaphoretic.   HENT:      Head: Normocephalic and atraumatic.      Right Ear: Hearing and external ear normal.      Left Ear: Hearing and external ear normal.      Nose: Nose normal.   Eyes:      General: No scleral icterus.        Right eye: No discharge.         Left eye: No discharge.      Conjunctiva/sclera: Conjunctivae normal.   Cardiovascular:      Rate and Rhythm: Normal rate and regular rhythm.      Heart sounds: Normal heart sounds. No murmur. No friction rub. No gallop.    Pulmonary:      Effort: Pulmonary effort is normal. No respiratory distress.      Breath sounds: Normal breath sounds. No stridor. No wheezing or rales.   Musculoskeletal:      Left elbow: He exhibits decreased range of motion (due to pain suspected -) and swelling (olecranon process swelling without erythema). He exhibits no effusion, no deformity and no  laceration. Tenderness found. Olecranon process tenderness noted.      Right knee: Normal.      Left knee: He exhibits decreased range of motion (due to pain). He exhibits no swelling, no effusion, no ecchymosis, no deformity, no laceration, no erythema, normal alignment and no LCL laxity. Tenderness found. Lateral joint line tenderness noted.      Comments: Faint warmth without erythema on lateral side of knee - unable to maneuver due to pain   Skin:     General: Skin is warm.      Capillary Refill: Capillary refill takes less than 2 seconds.      Findings: No rash.   Neurological:      Mental Status: He is alert and oriented to person, place, and time.   Psychiatric:         Behavior: Behavior is cooperative.         ED Course        Procedures    Results for orders placed or performed during the hospital encounter of 03/24/21 (from the past 24 hour(s))   CBC with platelets differential   Result Value Ref Range    WBC 6.4 4.0 - 11.0 10e9/L    RBC Count 4.02 (L) 4.4 - 5.9 10e12/L    Hemoglobin 14.5 13.3 - 17.7 g/dL    Hematocrit 42.6 40.0 - 53.0 %     (H) 78 - 100 fl    MCH 36.1 (H) 26.5 - 33.0 pg    MCHC 34.0 31.5 - 36.5 g/dL    RDW 13.3 10.0 - 15.0 %    Platelet Count 165 150 - 450 10e9/L    Diff Method Automated Method     % Neutrophils 77.0 %    % Lymphocytes 11.1 %    % Monocytes 10.3 %    % Eosinophils 0.3 %    % Basophils 0.8 %    % Immature Granulocytes 0.5 %    Nucleated RBCs 0 0 /100    Absolute Neutrophil 4.9 1.6 - 8.3 10e9/L    Absolute Lymphocytes 0.7 (L) 0.8 - 5.3 10e9/L    Absolute Monocytes 0.7 0.0 - 1.3 10e9/L    Absolute Eosinophils 0.0 0.0 - 0.7 10e9/L    Absolute Basophils 0.1 0.0 - 0.2 10e9/L    Abs Immature Granulocytes 0.0 0 - 0.4 10e9/L    Absolute Nucleated RBC 0.0    Basic metabolic panel   Result Value Ref Range    Sodium 132 (L) 133 - 144 mmol/L    Potassium 3.6 3.4 - 5.3 mmol/L    Chloride 98 94 - 109 mmol/L    Carbon Dioxide 24 20 - 32 mmol/L    Anion Gap 10 3 - 14 mmol/L     Glucose 145 (H) 70 - 99 mg/dL    Urea Nitrogen 6 (L) 7 - 30 mg/dL    Creatinine 0.60 (L) 0.66 - 1.25 mg/dL    GFR Estimate >90 >60 mL/min/[1.73_m2]    GFR Estimate If Black >90 >60 mL/min/[1.73_m2]    Calcium 9.4 8.5 - 10.1 mg/dL   Hepatic panel   Result Value Ref Range    Bilirubin Direct 1.2 (H) 0.0 - 0.2 mg/dL    Bilirubin Total 2.6 (H) 0.2 - 1.3 mg/dL    Albumin 3.3 (L) 3.4 - 5.0 g/dL    Protein Total 8.0 6.8 - 8.8 g/dL    Alkaline Phosphatase 161 (H) 40 - 150 U/L    ALT 49 0 - 70 U/L     (H) 0 - 45 U/L   INR   Result Value Ref Range    INR 1.17 (H) 0.86 - 1.14   Uric acid   Result Value Ref Range    Uric Acid 6.5 3.5 - 7.2 mg/dL   US Lower Extremity Venous Duplex Left    Narrative    US LOWER EXTREMITY VENOUS DUPLEX LEFT  3/24/2021 10:48 AM    CLINICAL HISTORY/INDICATION: rule out DVT. Left leg pain and swelling    COMPARISON: 8/7/2012    TECHNIQUE:   Grayscale, color-flow, and spectral waveform analysis were performed  of the deep veins of the left lower extremity    FINDINGS:   The left common femoral vein, femoral vein and popliteal vein  demonstrate normal compressibility, spectral waveform, color flow and  augmentation.    The left posterior tibial vein, peroneal vein, and greater saphenous  vein are compressible.    The contralateral right common femoral vein demonstrates normal  compressibility, spectral waveform, color flow and augmentation.      Impression    IMPRESSION:   No evidence of deep venous thrombosis in the left lower extremity.    LAKIA JOHNSON DO   XR Knee Left 1/2 Views    Narrative    XR KNEE LT 1/2 VW 3/24/2021 11:02 AM     HISTORY: acute knee pain no trauma      Impression    IMPRESSION: Negative exam.    KEN URENA MD   Elbow XR, 2 view, left    Narrative    XR ELBOW LT 2 VW 3/24/2021 11:02 AM     HISTORY: acute left elbow pain, swelling left elbow      Impression    IMPRESSION: Prominent dorsal soft tissue swelling. Small olecranon  process spur at the triceps tendon  attachment. No abnormal fat pad  sign to indicate elbow joint effusion. Joint space widths appear  within normal limits.    KEN URENA MD       Medications   lactated ringers BOLUS 1,000 mL (0 mLs Intravenous Stopped 3/24/21 1231)     Followed by   lactated ringers BOLUS 1,000 mL (has no administration in time range)     Followed by   lactated ringers infusion (has no administration in time range)   ondansetron (ZOFRAN) injection 4 mg (4 mg Intravenous Given 3/24/21 1005)   LORazepam (ATIVAN) injection 0.5 mg (0.5 mg Intravenous Given 3/24/21 1005)   predniSONE (DELTASONE) tablet 60 mg (60 mg Oral Given 3/24/21 1138)       Assessments & Plan (with Medical Decision Making)  Oscar Greenberg is a 55 year old male with past medical history remarkable for GERD, alcohol use, acute cholecystitis with subsequent sequela of sepsis and abdominal abscess who presents to the emergency room with reports of left elbow and left knee pain.  On exam patient has no obvious knee deformity he has multiple varicosities noted.  Patient has scattered bruising on his lower extremities.  Given history may be due to falls and or cirrhosis and associative sequela.  Ultrasound of left lower leg completed to rule out DVT and is negative.  X-ray of the left knee obtained and no obvious deformity or sign of a septic joint noted.  CBC completed and is unremarkable for infectious etiology.  Hepatic panel completed and reveals elevated bilirubin, alkaline phosphatase, and AST.  This may be related to chronic alcohol and possibly cirrhosis.  Recommend follow-up with primary care for further evaluation.  Uric acid obtained and is within normal limits.  This does not rule out a gouty arthritis diagnosis today but may favor reactive arthritis versus autoimmune disorder.  Did discuss all of this with patient.  Patient given 60 mg of prednisone in the ED and then given a prescription for 40 mg daily for 5 additional days.  Recommend follow-up with  primary for repeat labs next week strongly discussed alcohol cessation and long-term sequela of alcoholic cirrhosis including hepatic encephalopathy, ascites, jaundice.  Patient verbalized understanding regarding all of the above and was discharged in stable condition.     I have reviewed the nursing notes.    I have reviewed the findings, diagnosis, plan and need for follow up with the patient.    Discharge Medication List as of 3/24/2021 12:33 PM      START taking these medications    Details   predniSONE (DELTASONE) 20 MG tablet Take two tablets (= 40mg) each day for 5 (five) days, Disp-10 tablet, R-0, E-Prescribe             Final diagnoses:   Elevated liver enzymes   Multiple joint pain       3/24/2021   [facility] EMERGENCY DEPT     Gabriella Sanches, LATRICIA CNP  03/24/21 [id number]

## 2021-03-24 NOTE — DISCHARGE INSTRUCTIONS
Today you were seen in the emergency room.  We ruled out infectious causes and a blood clot in the legs.  There is no sign of fractures in the joints today specifically the knee and the elbow.  Possibility is reactive arthritis or gout or other form of arthritis.  Today you were given 60 mg of prednisone in the emergency department I recommend continuing taking 40 mg daily.  If your joint pain is completely gone by the third dose you do not need to finish the remaining doses.  I recommend follow-up with Dr. Mccoy in 1 week for reevaluation.  Please call and get this scheduled.  I have placed a referral for rheumatology as well for ongoing evaluation and further evaluation of this joint pain that you are experiencing.  Because your liver enzymes are elevated that is not the first time and is consistent with possible alcoholic cirrhosis.  I recommend considering seeing a liver specialist or a hepatologist.  I also strongly recommend stopping drinking altogether as further drinking can worsen your liver disease.

## 2021-03-24 NOTE — LETTER
March 24, 2021      To Whom It May Concern:      Oscar Greenberg was seen in our Emergency Department today, 03/24/21.  Please excuse him from work for today and the remainder of this week due to current illness.    Sincerely,        LATRICIA Serrato CNP

## 2021-06-23 ENCOUNTER — OFFICE VISIT (OUTPATIENT)
Dept: RHEUMATOLOGY | Facility: CLINIC | Age: 56
End: 2021-06-23
Payer: COMMERCIAL

## 2021-06-23 VITALS
SYSTOLIC BLOOD PRESSURE: 123 MMHG | DIASTOLIC BLOOD PRESSURE: 81 MMHG | OXYGEN SATURATION: 96 % | TEMPERATURE: 98.5 F | WEIGHT: 263 LBS | BODY MASS INDEX: 33.77 KG/M2 | HEART RATE: 93 BPM

## 2021-06-23 DIAGNOSIS — M25.50 POLYARTHRALGIA: Primary | ICD-10-CM

## 2021-06-23 DIAGNOSIS — M25.50 POLYARTHRALGIA: ICD-10-CM

## 2021-06-23 LAB
ALBUMIN SERPL-MCNC: 3.5 G/DL (ref 3.4–5)
ALP SERPL-CCNC: 199 U/L (ref 40–150)
ALT SERPL W P-5'-P-CCNC: 69 U/L (ref 0–70)
ANION GAP SERPL CALCULATED.3IONS-SCNC: 8 MMOL/L (ref 3–14)
AST SERPL W P-5'-P-CCNC: 103 U/L (ref 0–45)
BILIRUB SERPL-MCNC: 2.9 MG/DL (ref 0.2–1.3)
BUN SERPL-MCNC: 5 MG/DL (ref 7–30)
CALCIUM SERPL-MCNC: 9.3 MG/DL (ref 8.5–10.1)
CHLORIDE SERPL-SCNC: 97 MMOL/L (ref 94–109)
CO2 SERPL-SCNC: 29 MMOL/L (ref 20–32)
CREAT SERPL-MCNC: 0.52 MG/DL (ref 0.66–1.25)
CRP SERPL-MCNC: 24.2 MG/L (ref 0–8)
ERYTHROCYTE [DISTWIDTH] IN BLOOD BY AUTOMATED COUNT: 13.4 % (ref 10–15)
ERYTHROCYTE [SEDIMENTATION RATE] IN BLOOD BY WESTERGREN METHOD: 29 MM/H (ref 0–20)
GFR SERPL CREATININE-BSD FRML MDRD: >90 ML/MIN/{1.73_M2}
GLUCOSE SERPL-MCNC: 91 MG/DL (ref 70–99)
HCT VFR BLD AUTO: 39.3 % (ref 40–53)
HGB BLD-MCNC: 13.1 G/DL (ref 13.3–17.7)
MCH RBC QN AUTO: 34.2 PG (ref 26.5–33)
MCHC RBC AUTO-ENTMCNC: 33.3 G/DL (ref 31.5–36.5)
MCV RBC AUTO: 103 FL (ref 78–100)
PLATELET # BLD AUTO: 172 10E9/L (ref 150–450)
POTASSIUM SERPL-SCNC: 4.4 MMOL/L (ref 3.4–5.3)
PROT SERPL-MCNC: 8.1 G/DL (ref 6.8–8.8)
RBC # BLD AUTO: 3.83 10E12/L (ref 4.4–5.9)
SODIUM SERPL-SCNC: 134 MMOL/L (ref 133–144)
URATE SERPL-MCNC: 7.5 MG/DL (ref 3.5–7.2)
WBC # BLD AUTO: 6 10E9/L (ref 4–11)

## 2021-06-23 PROCEDURE — 80053 COMPREHEN METABOLIC PANEL: CPT | Performed by: STUDENT IN AN ORGANIZED HEALTH CARE EDUCATION/TRAINING PROGRAM

## 2021-06-23 PROCEDURE — 85027 COMPLETE CBC AUTOMATED: CPT | Performed by: STUDENT IN AN ORGANIZED HEALTH CARE EDUCATION/TRAINING PROGRAM

## 2021-06-23 PROCEDURE — 86140 C-REACTIVE PROTEIN: CPT | Performed by: STUDENT IN AN ORGANIZED HEALTH CARE EDUCATION/TRAINING PROGRAM

## 2021-06-23 PROCEDURE — 86200 CCP ANTIBODY: CPT | Performed by: STUDENT IN AN ORGANIZED HEALTH CARE EDUCATION/TRAINING PROGRAM

## 2021-06-23 PROCEDURE — 36415 COLL VENOUS BLD VENIPUNCTURE: CPT | Performed by: STUDENT IN AN ORGANIZED HEALTH CARE EDUCATION/TRAINING PROGRAM

## 2021-06-23 PROCEDURE — 84550 ASSAY OF BLOOD/URIC ACID: CPT | Performed by: STUDENT IN AN ORGANIZED HEALTH CARE EDUCATION/TRAINING PROGRAM

## 2021-06-23 PROCEDURE — 85652 RBC SED RATE AUTOMATED: CPT | Performed by: STUDENT IN AN ORGANIZED HEALTH CARE EDUCATION/TRAINING PROGRAM

## 2021-06-23 PROCEDURE — 99204 OFFICE O/P NEW MOD 45 MIN: CPT | Performed by: STUDENT IN AN ORGANIZED HEALTH CARE EDUCATION/TRAINING PROGRAM

## 2021-06-23 PROCEDURE — 86431 RHEUMATOID FACTOR QUANT: CPT | Performed by: STUDENT IN AN ORGANIZED HEALTH CARE EDUCATION/TRAINING PROGRAM

## 2021-06-23 ASSESSMENT — PAIN SCALES - GENERAL: PAINLEVEL: MODERATE PAIN (4)

## 2021-06-23 NOTE — PROGRESS NOTES
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 Greenberg is 55 year old male seen in clinic for evaluation of joint pains.     Episodic inflammatory arthritis : Reports episodic joint pain in bilateral knees, ankles, right elbow, wrists on and off.  Episodes happen 2-3 times a year.  During these flares the pain is 10/10 intensity and he cannot walk or put weight on his ankles.  In between the flares the pain is 3/10 intensity.  Denies any a.m. stiffness.  No family history of inflammatory arthritis or other autoimmune connective tissue disease.    On exam he has no synovitis over MCP, PIP joints, bilateral wrists, elbows.  He has mild puffiness noted over bilateral elbows.  Normal examination of shoulders.  No synovitis of the bilateral ankles, MTP joints.  He has small nodular deposits noted over bilateral heels, possible tophi.    Based on clinical history and physical exam possibility of crystalline arthropathy is high.  His last uric acid level checked in 3/2021 was 6.5 mg/dl but that was at the time of acute flare.  I will repeat uric acid level along with CBC, CMP.  We will get rheumatoid serologies to complete work-up.    X-rays of bilateral ankles ordered to evaluate for erosive disease.    Chronic alcohol intake: Chronic alcohol intake could be risk factors for Gout.  I recommended him to limit alcohol intake.  Information about dietary modifications in gout provided.    Plan    Your symptoms are most likely related to Gout     Will do blood tests for RA as well    Please give him information about gout    Follow up in 6 weeks.       -- Orders placed this encounter  Orders Placed This  Encounter   Procedures     X-ray bl ankle 3+ views     CBC with platelets     Comprehensive metabolic panel     Uric acid     Rheumatoid factor     Cyclic Citrullinated Peptide Antibody IgG     Erythrocyte sedimentation rate auto     CRP inflammation       TIME SPENT:   A total of 60 minutes was spent on the patient today, greater than 50% of that time was spent on face to face counseling and care coordination regarding diagnoses and treatment options as mentioned above.                    Active Problem List:     Patient Active Problem List    Diagnosis Date Noted     Adjustment disorder with anxious mood 07/22/2019     Priority: Medium     Venous (peripheral) insufficiency 04/09/2019     Priority: Medium     Colon polyps 02/13/2019     Priority: Medium     FINAL DIAGNOSIS:   A.  Right colon, mucosal biopsy:   - Tubular adenoma.   - Negative for high-grade dysplasia and malignancy.     B.  Right colon, mucosal biopsies:   - Several fragments of tubular adenoma.   - Negative for high-grade dysplasia and malignancy     C.  Sigmoid colon, mucosal biopsies:   - Tubular adenoma and hyperplastic polyp.   - Negative for high-grade dysplasia and malignancy.        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     Priority: Medium     GERD (gastroesophageal reflux disease) 10/23/2008     Priority: Medium     ALCOH USE 01/11/2007     Priority: Medium     500 ML per day in Jan o7  Chem dep eval recommended       Contact dermatitis and other eczema, due to unspecified cause 01/11/2007     Priority: Medium     Nonspecific elevation of levels of transaminase or lactic acid dehydrogenase (LDH) 01/11/2007     Priority: Medium     Suspect etoh related              History of Present Illness:   Oscar Greenberg is a 55 year old male with PMH of chronic alcohol intake, alcoholic hepatitis, varicose veins, joint pains seen in the clinic in consultation at  request of Dr Sanches for evaluation of joint pains.    He reports intermittent pain in knees, ankles, elbows and wrist for the last 5 to 6 years.  Severe flares happen during 2 - 3 times a year.  He has noticed swelling of his elbow, ankles at the time of the flare.  He has difficulty walking and putting weight on his feet at the time of a flare.  Episode last for about 3 to 5 days.  In 3/21 he presented to the ER with severe pain in the left knee and elbow.  X-ray of the knee and the elbow at that time did not show any evidence of fracture or erosive disease.  Mild puffiness was noted around the left elbow.  His blood work during the ER visit showed uric acid of 6.5, elevated AST, alkaline phosphatase, total bilirubin, macrocytosis and lymphopenia.    He is a chronic alcoholic for many years.  Denies any family history of gout or other autoimmune connective tissue disease.    No history of psoriasis, ulcerative colitis or chron's disease. No h/o iritis, enthesitis, finger or toe swelling like dactylitis, plantar fascitis or heel pain. Denies any raynauds, malar rash, photosensitivity, recurrent mouth/genital ulcers, sicca symptoms, pleuritic chest pains, recurrent sinusitis/rhinitis, swallowing difficulty, hearing or visual changes recently. No h/o arterial/venous thrombosis in the past. Denies any tick bite, recent GI/ infection.             Review of Systems:     Review Of Systems  Constitutional: denies fever, chills, night sweats and weight loss.  Skin: No skin rash.  Eyes: No dryness or irritation in eyes. No episode of eye inflammation or redness.   Ears/Nose/Throat: no recurrent sinus infections.  Respiratory: No shortness of breath, dyspnea on exertion, cough, or hemoptysis  Cardiovascular: no chest pain or palpitations.  Gastrointestinal: no nausea, vomiting, abdominal pain.  Normal bowel movements.  Genitourinary: no dysuria, frequency  or hematuria.  Musculoskeletal: as in HPI  Neurologic: + numbness,  tingling.  Psychiatric: no mood disorders.  Hematologic/Lymphatic/Immunologic: no history of easy bruising, petechia or purpura.  No abnormal bleeding.   Endocrine: no h/o thyroid disease or Diabetes.                  Past Medical History:     Past Medical History:   Diagnosis Date     Alcohol abuse      Past Surgical History:   Procedure Laterality Date     COLONOSCOPY N/A 2/8/2019    Procedure: COMBINED COLONOSCOPY, SINGLE OR MULTIPLE BIOPSY/POLYPECTOMY BY BIOPSY;  Surgeon: Toño Escamilla DO;  Location: [facility]     ENDOSCOPIC RETROGRADE CHOLANGIOPANCREATOGRAM N/A 6/7/2016    Procedure: COMBINED ENDOSCOPIC RETROGRADE CHOLANGIOPANCREATOGRAPHY, PLACE TUBE/STENT;  Surgeon: Stephane Hunter MD;  Location: [facility]     LAPAROSCOPIC CHOLECYSTECTOMY WITH CHOLANGIOGRAMS N/A 6/2/2016    Procedure: LAPAROSCOPIC CHOLECYSTECTOMY WITH CHOLANGIOGRAMS;  Surgeon: Darian Mcclain MD;  Location: [facility]     NO HISTORY OF SURGERY              Social History:     Social History     Occupational History     Not on file   Tobacco Use     Smoking status: Never Smoker     Smokeless tobacco: Never Used   Substance and Sexual Activity     Alcohol use: Yes     Comment: occ.      Drug use: No     Sexual activity: Yes     Partners: Female            Family History:     Family History   Problem Relation Age of Onset     Unknown/Adopted Maternal Grandmother      C.A.D. Maternal Grandfather      Unknown/Adopted Paternal Grandmother      Cancer Paternal Grandfather      Gallbladder Disease Father      Post-Traumatic Stress Disorder (PTSD) Father             Allergies:     Allergies   Allergen Reactions     Nka [No Known Allergies]             Medications:     Current Outpatient Medications   Medication Sig Dispense Refill     omeprazole (PRILOSEC) 40 MG DR capsule Take 1 capsule (40 mg) by mouth daily (Patient taking differently: Take 40 mg by mouth daily 20mg -40mg daily as needed) 30 capsule 0     IBUPROFEN PO Take 400 mg by mouth every  8 hours as needed When needed       LORazepam (ATIVAN) 1 MG tablet Take 0.5-1 tablets (0.5-1 mg) by mouth every 6 hours as needed for anxiety (Patient not taking: Reported on 7/25/2019) 15 tablet 0     ondansetron (ZOFRAN ODT) 4 MG ODT tab Take 1 tablet (4 mg) by mouth every 8 hours as needed for nausea or vomiting 7 tablet 0     predniSONE (DELTASONE) 20 MG tablet Take two tablets (= 40mg) each day for 5 (five) days 10 tablet 0            Physical Exam:   Blood pressure 123/81, pulse 93, temperature 98.5  F (36.9  C), temperature source Oral, weight 119.3 kg (263 lb), SpO2 96 %.  Wt Readings from Last 4 Encounters:   06/23/21 119.3 kg (263 lb)   03/24/21 117.9 kg (260 lb)   11/12/20 122.5 kg (270 lb)   07/25/19 126.1 kg (278 lb)       Constitutional: Obese, appearing stated age; cooperative  Eyes: nl EOM, PERRLA, vision, conjunctiva, sclera  ENT: nl external ears, nose, hearing, lips, teeth, gums, throat  No mucous membrane lesions, normal saliva pool  Neck: no mass or thyroid enlargement  Resp: lungs clear to auscultation, nl to palpation  CV: RRR, no murmurs, rubs or gallops, no edema  GI: no ABD mass or tenderness, no HSM  : not tested  Lymph: no cervical, supraclavicular, inguinal or epitrochlear nodes    MS: All TMJ, neck, shoulder, elbow, wrist, MCP/PIP/DIP, spine, hip, knee, ankle, and foot MTP/IP joints were examined.   -- No active synovitis or deformity. Full ROM.  Normal  strength.   -- No dactylitis,  tenosynovitis, enthesopathy.  -- Soft tissue puffiness noted over elbows, multiple small nodular swelling over the heels raising concern about tophi    Skin: no nail pitting, alopecia, rash, nodules or lesions  Neuro: nl cranial nerves, strength, sensation, DTRs.   Psych: nl judgement, orientation, memory, affect.         Data:     No results found for any visits on 06/23/21.    Recent Labs   Lab Test 03/24/21  0959 11/12/20 2001 07/17/19  0759 07/10/19  2021 05/16/16  1300 05/16/16  1300   WBC 6.4  10.9  --  5.8   < >  --    RBC 4.02* 4.34*  --  4.72   < >  --    HGB 14.5 15.8  --  16.0   < >  --    HCT 42.6 47.1  --  47.4   < >  --    * 109*  --  100   < >  --    RDW 13.3 13.8  --  12.1   < >  --     225  --  154   < >  --    ALBUMIN 3.3* 3.5 3.3* 4.2   < >  --    CRP  --   --   --   --   --  108.0*   BUN 6* 6* 8 7   < >  --     < > = values in this interval not displayed.      No results for input(s): TSH, T4 in the last 09362 hours.  Hemoglobin   Date Value Ref Range Status   03/24/2021 14.5 13.3 - 17.7 g/dL Final   11/12/2020 15.8 13.3 - 17.7 g/dL Final   07/10/2019 16.0 13.3 - 17.7 g/dL Final     Urea Nitrogen   Date Value Ref Range Status   03/24/2021 6 (L) 7 - 30 mg/dL Final   11/12/2020 6 (L) 7 - 30 mg/dL Final   07/17/2019 8 7 - 30 mg/dL Final     CRP Inflammation   Date Value Ref Range Status   05/16/2016 108.0 (H) 0.0 - 8.0 mg/L Final     AST   Date Value Ref Range Status   03/24/2021 121 (H) 0 - 45 U/L Final   11/12/2020 143 (H) 0 - 45 U/L Final   07/17/2019 163 (H) 0 - 45 U/L Final     Albumin   Date Value Ref Range Status   03/24/2021 3.3 (L) 3.4 - 5.0 g/dL Final   11/12/2020 3.5 3.4 - 5.0 g/dL Final   07/17/2019 3.3 (L) 3.4 - 5.0 g/dL Final     Alkaline Phosphatase   Date Value Ref Range Status   03/24/2021 161 (H) 40 - 150 U/L Final   11/12/2020 177 (H) 40 - 150 U/L Final   07/17/2019 94 40 - 150 U/L Final     ALT   Date Value Ref Range Status   03/24/2021 49 0 - 70 U/L Final   11/12/2020 92 (H) 0 - 70 U/L Final   07/17/2019 175 (H) 0 - 70 U/L Final     Recent Labs   Lab Test 03/24/21  0959 11/12/20 2001 07/17/19  0759 07/10/19  2021 05/30/16  1205 05/30/16  1205 05/16/16  0825 05/16/16  0825   WBC 6.4 10.9  --  5.8   < > 6.3   < > 6.0   HGB 14.5 15.8  --  16.0   < > 13.1*   < > 16.0   HCT 42.6 47.1  --  47.4   < > 39.3*   < > 46.1   * 109*  --  100   < > 101*   < > 98    225  --  154   < > 277   < > 139*   BUN 6* 6* 8 7   < > 5*   < > 12   * 143* 163* 147*    < > 199*   < > 185*   ALT 49 92* 175* 137*   < > 187*   < > 111*   GGT  --   --   --   --   --  1,135*  --  1,851*   ALKPHOS 161* 177* 94 102   < > 271*   < > 113    < > = values in this interval not displayed.       Reviewed Rheumatology lab flowsheet    Kerrie Coulter MD  [facility]  Department of Rheumatology & Autoimmune Disorders  [facility]: [phone]   Pager - [phone]

## 2021-06-23 NOTE — PATIENT INSTRUCTIONS
Your symptoms are most likely related to Gout     Will do blood tests for RA as well    Please give him information about gout    Follow up in 6 weeks.   Patient Education     Gout Diet  Gout is a painful condition caused by an excess of uric acid, a waste product made by the body. Uric acid forms crystals that collect in the joints. The immune response to these crystals brings on symptoms of joint pain and swelling. This is called a gout attack. Often, medications and diet changes are combined to manage gout. Below are some guidelines for changing your diet to help you manage gout and prevent attacks. Your healthcare provider will help you determine the best eating plan for you.  Eating to manage gout  Weight loss for those who are overweight may help reduce gout attacks.  Eat less of these foods  Eating too many foods containing purines may raise the levels of uric acid in your body. This raises your risk for a gout attack. Try to limit these foods and drinks:    Alcohol, such as beer and red wine. You may be told to avoid alcohol completely.    Soft drinks that contain sugar or high fructose corn syrup    Certain fish, including anchovies, sardines, fish eggs, and herring    Shellfish    Certain meats, such as red meat, hot dogs, luncheon meats, and turkey    Organ meats, such as liver, kidneys, and sweetbreads    Legumes, such as dried beans and peas    Other high fat foods such as gravy, whole milk, and high fat cheeses    Vegetables such as asparagus, cauliflower, spinach, and mushrooms used to be thought to contribute to an increased risk for a gout attack, but recent studies show that high purine vegetables don't increase the risk for a gout attack.  Eat more of these foods  Other foods may be helpful for people with gout. Add some of these foods to your diet:    Cherries contain chemicals that may lower uric acid.    Omega fatty acids. These are found in some fatty fish such as salmon, certain oils (flax,  olive, or nut), and nuts themselves. Omega fatty acids may help prevent inflammation due to gout.    Dairy products that are low-fat or fat-free, such as cheese and yogurt    Complex carbohydrate foods, including whole grains, brown rice, oats, and beans    Coffee, in moderation    Water, approximately 64 ounces per day  Follow-up care  Follow up with your healthcare provider as advised.  When to seek medical advice  Call your healthcare provider right away if any of these occur:    Return of gout symptoms, usually at night:    Severe pain, swelling, and heat in a joint, especially the base of the big toe    Affected joint is hard to move    Skin of the affected joint is purple or red    Fever of 100.4 F (38 C) or higher    Pain that doesn't get better even with prescribed medicine   [employer] last reviewed this educational content on 6/1/2018 2000-2021 [employer]. All rights reserved. This information is not intended as a substitute for professional medical care. Always follow your healthcare professional's instructions.

## 2021-06-23 NOTE — NURSING NOTE
Oscar Greenberg's goals for this visit include:   Chief Complaint   Patient presents with     Consult     New pt: referred by Gabriella Sanches APRN CNP. Multiple joint pain       He requests these members of his care team be copied on today's visit information: no    PCP: Rafa Mccoy    Referring Provider:  LATRICIA Cruz CNP  [address] [place] [address]    /81 (BP Location: Left arm, Patient Position: Sitting, Cuff Size: Adult Large)   Pulse 93   Temp 98.5  F (36.9  C) (Oral)   Wt 119.3 kg (263 lb)   SpO2 96%   BMI 33.77 kg/m      Do you need any medication refills at today's visit? No    Casi Jones LPN

## 2021-06-24 LAB
CCP AB SER IA-ACNC: 1 U/ML
RHEUMATOID FACT SER NEPH-ACNC: <7 IU/ML (ref 0–20)

## 2021-09-19 ENCOUNTER — HEALTH MAINTENANCE LETTER (OUTPATIENT)
Age: 56
End: 2021-09-19

## 2021-10-24 NOTE — PROGRESS NOTES
Assessment & Plan     Alcohol abuse  Patient was seen in ER back in March and did not follow-up with elevated liver enzymes.  Patient has continued issues with alcoholism.  Due to extensive jaundice, shortness of breath and fatigue with distention of abdomen and recent hx of black stools, I am sending patient to ER for immediate workup and inpatient treatment to get him stable.  I spoke with ER about the situation and transferred patient to ER in wheelchair with his wife also.    Jaundice  See note above.    Other fatigue  See note above.    Kalyn Sequeira NP  [facility]   Oscar Greenberg is a 56 year old male who presents today with the following health issues;    Patient presents today with jaundice significantly from waist to head.  He has history of alcohol use and abuse.  He also has GERD.  He states that he did have some black tarry stools but these have improved over the last couple of days.  He started eating again the last couple of days.  He admits to his urine being orange in color.  He states his symptoms started about 2 weeks ago and he quit drinking at that time.  Prior to that he was drinking 6 drinks daily with about 2 ounces of vodka in each drink.  Patient states that he has some sharp upper abdominal pain and his stomach has been distended.  Patient is very weak and fatigued.  He states that he had a really hard time walking from the car in here today.  He also struggles with getting up balance while he is and strength wise to the exam table.    HPI     Jaundice      Duration: 2 weeks ago    Description (location/character/radiation): Jaundice waist up to head    Intensity:  severe    Accompanying signs and symptoms:  Was throwing up for 3 days at the beginning of his symptoms 2 weeks ago. Orange Urine, Stomach pain (distended) and Fatigue    History (similar episodes/previous evaluation): Has Sepsis 5 yrs ago    Precipitating or alleviating factors:  "None    Therapies tried and outcome: None      Review of Systems   Review Of Systems  Skin: positive for jaundice as per HPI and in the eyes  Eyes: negative  Ears/Nose/Throat: negative  Respiratory: Shortness of breath- patient appears shortness of breath during exam  Cardiovascular: negative  Gastrointestinal: positive for jaundice, distended abdomen, pain in upper abdomen, black tarry stools that are improving per patient.  Genitourinary: positive for orange tinted urine  Musculoskeletal: positive for negative and weakness  Neurologic: negative  Psychiatric: depression and excessive alcohol consumption history      Objective    /56   Pulse 106   Temp 97.4  F (36.3  C) (Tympanic)   Resp 14   Ht 1.88 m (6' 2\")   Wt 126.1 kg (278 lb)   SpO2 96%   BMI 35.69 kg/m    Body mass index is 35.69 kg/m .  Physical Exam   GENERAL: healthy, alert and no distress  EYES: PERRL and jaundice in the eyes  HENT: ear canals and TM's normal, nose and mouth without ulcers or lesions  NECK: no adenopathy, no asymmetry, masses, or scars and thyroid normal to palpation  RESP: lungs clear to auscultation - no rales, rhonchi or wheezes  CV: regular rate and rhythm, normal S1 S2, no S3 or S4, no murmur, click or rub, no peripheral edema and peripheral pulses strong  ABDOMEN: tenderness epigastric, RUQ and LUQ, bowel sounds normal and distended abdomen that is still soft on palpation  MS: weakness with getting up to exam table in lower extremities, mild swelling in ankles with no pitting edema  SKIN: jaundice waist up  NEURO: weakness of lower extremities and mentation intact  PSYCH: affect flat    "

## 2021-10-25 ENCOUNTER — HOSPITAL ENCOUNTER (EMERGENCY)
Facility: CLINIC | Age: 56
Discharge: ANOTHER HOSPITAL | End: 2021-10-26
Attending: EMERGENCY MEDICINE | Admitting: EMERGENCY MEDICINE
Payer: COMMERCIAL

## 2021-10-25 ENCOUNTER — APPOINTMENT (OUTPATIENT)
Dept: CT IMAGING | Facility: CLINIC | Age: 56
End: 2021-10-25
Attending: EMERGENCY MEDICINE
Payer: COMMERCIAL

## 2021-10-25 ENCOUNTER — OFFICE VISIT (OUTPATIENT)
Dept: FAMILY MEDICINE | Facility: CLINIC | Age: 56
End: 2021-10-25
Payer: COMMERCIAL

## 2021-10-25 ENCOUNTER — APPOINTMENT (OUTPATIENT)
Dept: MRI IMAGING | Facility: CLINIC | Age: 56
End: 2021-10-25
Attending: EMERGENCY MEDICINE
Payer: COMMERCIAL

## 2021-10-25 VITALS
HEIGHT: 74 IN | SYSTOLIC BLOOD PRESSURE: 100 MMHG | RESPIRATION RATE: 14 BRPM | TEMPERATURE: 97.4 F | WEIGHT: 278 LBS | BODY MASS INDEX: 35.68 KG/M2 | OXYGEN SATURATION: 96 % | DIASTOLIC BLOOD PRESSURE: 56 MMHG | HEART RATE: 106 BPM

## 2021-10-25 DIAGNOSIS — F10.10 ALCOHOL ABUSE: Primary | ICD-10-CM

## 2021-10-25 DIAGNOSIS — R17 JAUNDICE: ICD-10-CM

## 2021-10-25 DIAGNOSIS — E87.1 HYPONATREMIA: ICD-10-CM

## 2021-10-25 DIAGNOSIS — R93.5 ABNORMAL CT OF THE ABDOMEN: ICD-10-CM

## 2021-10-25 DIAGNOSIS — K70.11 ALCOHOLIC HEPATITIS WITH ASCITES (H): ICD-10-CM

## 2021-10-25 DIAGNOSIS — R53.83 OTHER FATIGUE: ICD-10-CM

## 2021-10-25 DIAGNOSIS — K70.9 ALCOHOLIC LIVER DISEASE (H): ICD-10-CM

## 2021-10-25 LAB
ALBUMIN SERPL-MCNC: 1.6 G/DL (ref 3.4–5)
ALBUMIN SERPL-MCNC: 1.7 G/DL (ref 3.4–5)
ALP SERPL-CCNC: 246 U/L (ref 40–150)
ALT SERPL W P-5'-P-CCNC: 49 U/L (ref 0–70)
ANION GAP SERPL CALCULATED.3IONS-SCNC: 12 MMOL/L (ref 3–14)
ANION GAP SERPL CALCULATED.3IONS-SCNC: 13 MMOL/L (ref 3–14)
APAP SERPL-MCNC: <2 MG/L (ref 10–30)
APTT PPP: 55 SECONDS (ref 22–38)
AST SERPL W P-5'-P-CCNC: 209 U/L (ref 0–45)
BASOPHILS # BLD AUTO: 0.1 10E3/UL (ref 0–0.2)
BASOPHILS NFR BLD AUTO: 1 %
BILIRUB DIRECT SERPL-MCNC: 25.3 MG/DL (ref 0–0.2)
BILIRUB SERPL-MCNC: 28.1 MG/DL (ref 0.2–1.3)
BUN SERPL-MCNC: 12 MG/DL (ref 7–30)
BUN SERPL-MCNC: 15 MG/DL (ref 7–30)
CALCIUM SERPL-MCNC: 7.9 MG/DL (ref 8.5–10.1)
CALCIUM SERPL-MCNC: 8.3 MG/DL (ref 8.5–10.1)
CHLORIDE BLD-SCNC: 84 MMOL/L (ref 94–109)
CHLORIDE BLD-SCNC: 85 MMOL/L (ref 94–109)
CO2 SERPL-SCNC: 24 MMOL/L (ref 20–32)
CO2 SERPL-SCNC: 24 MMOL/L (ref 20–32)
CREAT SERPL-MCNC: 1 MG/DL (ref 0.66–1.25)
CREAT SERPL-MCNC: 1.19 MG/DL (ref 0.66–1.25)
EOSINOPHIL # BLD AUTO: 0.1 10E3/UL (ref 0–0.7)
EOSINOPHIL NFR BLD AUTO: 1 %
ERYTHROCYTE [DISTWIDTH] IN BLOOD BY AUTOMATED COUNT: 20.9 % (ref 10–15)
ETHANOL SERPL-MCNC: <0.01 G/DL
GFR SERPL CREATININE-BSD FRML MDRD: 69 ML/MIN/1.73M2
GFR SERPL CREATININE-BSD FRML MDRD: 85 ML/MIN/1.73M2
GLUCOSE BLD-MCNC: 122 MG/DL (ref 70–99)
GLUCOSE BLD-MCNC: 132 MG/DL (ref 70–99)
HCT VFR BLD AUTO: 34 % (ref 40–53)
HGB BLD-MCNC: 11.9 G/DL (ref 13.3–17.7)
HOLD SPECIMEN: NORMAL
IMM GRANULOCYTES # BLD: 0.2 10E3/UL
IMM GRANULOCYTES NFR BLD: 2 %
INR PPP: 1.94 (ref 0.85–1.15)
LIPASE SERPL-CCNC: 325 U/L (ref 73–393)
LYMPHOCYTES # BLD AUTO: 0.7 10E3/UL (ref 0.8–5.3)
LYMPHOCYTES NFR BLD AUTO: 6 %
MCH RBC QN AUTO: 33 PG (ref 26.5–33)
MCHC RBC AUTO-ENTMCNC: 35 G/DL (ref 31.5–36.5)
MCV RBC AUTO: 94 FL (ref 78–100)
MONOCYTES # BLD AUTO: 1.1 10E3/UL (ref 0–1.3)
MONOCYTES NFR BLD AUTO: 10 %
NEUTROPHILS # BLD AUTO: 9.4 10E3/UL (ref 1.6–8.3)
NEUTROPHILS NFR BLD AUTO: 80 %
NRBC # BLD AUTO: 0 10E3/UL
NRBC BLD AUTO-RTO: 0 /100
PLATELET # BLD AUTO: 298 10E3/UL (ref 150–450)
POTASSIUM BLD-SCNC: 3.1 MMOL/L (ref 3.4–5.3)
POTASSIUM BLD-SCNC: 3.2 MMOL/L (ref 3.4–5.3)
PROT SERPL-MCNC: 7.2 G/DL (ref 6.8–8.8)
RBC # BLD AUTO: 3.61 10E6/UL (ref 4.4–5.9)
SARS-COV-2 RNA RESP QL NAA+PROBE: NEGATIVE
SODIUM SERPL-SCNC: 121 MMOL/L (ref 133–144)
SODIUM SERPL-SCNC: 121 MMOL/L (ref 133–144)
WBC # BLD AUTO: 11.6 10E3/UL (ref 4–11)

## 2021-10-25 PROCEDURE — 83930 ASSAY OF BLOOD OSMOLALITY: CPT | Performed by: EMERGENCY MEDICINE

## 2021-10-25 PROCEDURE — 85025 COMPLETE CBC W/AUTO DIFF WBC: CPT | Performed by: EMERGENCY MEDICINE

## 2021-10-25 PROCEDURE — 96365 THER/PROPH/DIAG IV INF INIT: CPT | Mod: 59 | Performed by: EMERGENCY MEDICINE

## 2021-10-25 PROCEDURE — 86706 HEP B SURFACE ANTIBODY: CPT | Performed by: EMERGENCY MEDICINE

## 2021-10-25 PROCEDURE — 99291 CRITICAL CARE FIRST HOUR: CPT | Performed by: EMERGENCY MEDICINE

## 2021-10-25 PROCEDURE — 250N000011 HC RX IP 250 OP 636: Performed by: EMERGENCY MEDICINE

## 2021-10-25 PROCEDURE — 258N000003 HC RX IP 258 OP 636: Performed by: EMERGENCY MEDICINE

## 2021-10-25 PROCEDURE — 87635 SARS-COV-2 COVID-19 AMP PRB: CPT | Performed by: EMERGENCY MEDICINE

## 2021-10-25 PROCEDURE — 99215 OFFICE O/P EST HI 40 MIN: CPT | Performed by: NURSE PRACTITIONER

## 2021-10-25 PROCEDURE — 96366 THER/PROPH/DIAG IV INF ADDON: CPT | Performed by: EMERGENCY MEDICINE

## 2021-10-25 PROCEDURE — 96367 TX/PROPH/DG ADDL SEQ IV INF: CPT | Performed by: EMERGENCY MEDICINE

## 2021-10-25 PROCEDURE — 86705 HEP B CORE ANTIBODY IGM: CPT | Performed by: EMERGENCY MEDICINE

## 2021-10-25 PROCEDURE — 87149 DNA/RNA DIRECT PROBE: CPT | Performed by: EMERGENCY MEDICINE

## 2021-10-25 PROCEDURE — 85610 PROTHROMBIN TIME: CPT | Performed by: EMERGENCY MEDICINE

## 2021-10-25 PROCEDURE — 86709 HEPATITIS A IGM ANTIBODY: CPT | Performed by: EMERGENCY MEDICINE

## 2021-10-25 PROCEDURE — 36415 COLL VENOUS BLD VENIPUNCTURE: CPT | Performed by: EMERGENCY MEDICINE

## 2021-10-25 PROCEDURE — 87077 CULTURE AEROBIC IDENTIFY: CPT | Performed by: EMERGENCY MEDICINE

## 2021-10-25 PROCEDURE — C9803 HOPD COVID-19 SPEC COLLECT: HCPCS | Performed by: EMERGENCY MEDICINE

## 2021-10-25 PROCEDURE — 87799 DETECT AGENT NOS DNA QUANT: CPT | Mod: 91 | Performed by: EMERGENCY MEDICINE

## 2021-10-25 PROCEDURE — 74177 CT ABD & PELVIS W/CONTRAST: CPT

## 2021-10-25 PROCEDURE — 99291 CRITICAL CARE FIRST HOUR: CPT | Mod: 25 | Performed by: EMERGENCY MEDICINE

## 2021-10-25 PROCEDURE — 250N000009 HC RX 250: Performed by: EMERGENCY MEDICINE

## 2021-10-25 PROCEDURE — 83690 ASSAY OF LIPASE: CPT | Performed by: EMERGENCY MEDICINE

## 2021-10-25 PROCEDURE — 96361 HYDRATE IV INFUSION ADD-ON: CPT | Performed by: EMERGENCY MEDICINE

## 2021-10-25 PROCEDURE — 83605 ASSAY OF LACTIC ACID: CPT | Performed by: EMERGENCY MEDICINE

## 2021-10-25 PROCEDURE — 82248 BILIRUBIN DIRECT: CPT | Performed by: EMERGENCY MEDICINE

## 2021-10-25 PROCEDURE — 96375 TX/PRO/DX INJ NEW DRUG ADDON: CPT | Mod: 59 | Performed by: EMERGENCY MEDICINE

## 2021-10-25 PROCEDURE — 82077 ASSAY SPEC XCP UR&BREATH IA: CPT | Performed by: EMERGENCY MEDICINE

## 2021-10-25 PROCEDURE — 74183 MRI ABD W/O CNTR FLWD CNTR: CPT

## 2021-10-25 PROCEDURE — 80048 BASIC METABOLIC PNL TOTAL CA: CPT | Performed by: EMERGENCY MEDICINE

## 2021-10-25 PROCEDURE — 85730 THROMBOPLASTIN TIME PARTIAL: CPT | Performed by: EMERGENCY MEDICINE

## 2021-10-25 PROCEDURE — 96376 TX/PRO/DX INJ SAME DRUG ADON: CPT | Mod: 59 | Performed by: EMERGENCY MEDICINE

## 2021-10-25 PROCEDURE — A9585 GADOBUTROL INJECTION: HCPCS | Performed by: EMERGENCY MEDICINE

## 2021-10-25 PROCEDURE — 86803 HEPATITIS C AB TEST: CPT | Performed by: EMERGENCY MEDICINE

## 2021-10-25 PROCEDURE — 80143 DRUG ASSAY ACETAMINOPHEN: CPT | Performed by: EMERGENCY MEDICINE

## 2021-10-25 PROCEDURE — 255N000002 HC RX 255 OP 636: Performed by: EMERGENCY MEDICINE

## 2021-10-25 RX ORDER — HYDROMORPHONE HYDROCHLORIDE 1 MG/ML
0.5 INJECTION, SOLUTION INTRAMUSCULAR; INTRAVENOUS; SUBCUTANEOUS
Status: DISCONTINUED | OUTPATIENT
Start: 2021-10-25 | End: 2021-10-26 | Stop reason: HOSPADM

## 2021-10-25 RX ORDER — POTASSIUM CHLORIDE 7.45 MG/ML
10 INJECTION INTRAVENOUS
Status: DISPENSED | OUTPATIENT
Start: 2021-10-25 | End: 2021-10-25

## 2021-10-25 RX ORDER — CEFTRIAXONE 2 G/1
2 INJECTION, POWDER, FOR SOLUTION INTRAMUSCULAR; INTRAVENOUS EVERY 24 HOURS
Status: DISCONTINUED | OUTPATIENT
Start: 2021-10-25 | End: 2021-10-26 | Stop reason: HOSPADM

## 2021-10-25 RX ORDER — LIDOCAINE 40 MG/G
CREAM TOPICAL
Status: CANCELLED | OUTPATIENT
Start: 2021-10-25

## 2021-10-25 RX ORDER — GADOBUTROL 604.72 MG/ML
12 INJECTION INTRAVENOUS ONCE
Status: COMPLETED | OUTPATIENT
Start: 2021-10-25 | End: 2021-10-25

## 2021-10-25 RX ORDER — ONDANSETRON 2 MG/ML
4 INJECTION INTRAMUSCULAR; INTRAVENOUS ONCE
Status: COMPLETED | OUTPATIENT
Start: 2021-10-25 | End: 2021-10-25

## 2021-10-25 RX ORDER — IOPAMIDOL 755 MG/ML
100 INJECTION, SOLUTION INTRAVASCULAR ONCE
Status: COMPLETED | OUTPATIENT
Start: 2021-10-25 | End: 2021-10-25

## 2021-10-25 RX ADMIN — CEFTRIAXONE 2 G: 2 INJECTION, POWDER, FOR SOLUTION INTRAMUSCULAR; INTRAVENOUS at 11:36

## 2021-10-25 RX ADMIN — SODIUM CHLORIDE 1000 ML: 9 INJECTION, SOLUTION INTRAVENOUS at 13:30

## 2021-10-25 RX ADMIN — SODIUM CHLORIDE 50 ML: 9 INJECTION, SOLUTION INTRAVENOUS at 14:56

## 2021-10-25 RX ADMIN — POTASSIUM CHLORIDE 10 MEQ: 7.46 INJECTION, SOLUTION INTRAVENOUS at 13:32

## 2021-10-25 RX ADMIN — ONDANSETRON 4 MG: 2 INJECTION INTRAMUSCULAR; INTRAVENOUS at 11:34

## 2021-10-25 RX ADMIN — HYDROMORPHONE HYDROCHLORIDE 0.5 MG: 1 INJECTION, SOLUTION INTRAMUSCULAR; INTRAVENOUS; SUBCUTANEOUS at 22:20

## 2021-10-25 RX ADMIN — POTASSIUM CHLORIDE: 2 INJECTION, SOLUTION, CONCENTRATE INTRAVENOUS at 21:35

## 2021-10-25 RX ADMIN — SODIUM CHLORIDE 74 ML: 9 INJECTION, SOLUTION INTRAVENOUS at 12:39

## 2021-10-25 RX ADMIN — DEXTROSE AND SODIUM CHLORIDE: 5; 900 INJECTION, SOLUTION INTRAVENOUS at 20:47

## 2021-10-25 RX ADMIN — GADOBUTROL 12 ML: 604.72 INJECTION INTRAVENOUS at 14:56

## 2021-10-25 RX ADMIN — IOPAMIDOL 100 ML: 755 INJECTION, SOLUTION INTRAVENOUS at 12:38

## 2021-10-25 RX ADMIN — HYDROMORPHONE HYDROCHLORIDE 0.5 MG: 1 INJECTION, SOLUTION INTRAMUSCULAR; INTRAVENOUS; SUBCUTANEOUS at 11:34

## 2021-10-25 ASSESSMENT — ENCOUNTER SYMPTOMS
NAUSEA: 1
MUSCULOSKELETAL NEGATIVE: 1
HEMATOLOGIC/LYMPHATIC NEGATIVE: 1
ABDOMINAL DISTENTION: 1
ENDOCRINE NEGATIVE: 1
EYES NEGATIVE: 1
PSYCHIATRIC NEGATIVE: 1
VOMITING: 1
NEUROLOGICAL NEGATIVE: 1
COLOR CHANGE: 1
APPETITE CHANGE: 1
ALLERGIC/IMMUNOLOGIC NEGATIVE: 1
ACTIVITY CHANGE: 1
RESPIRATORY NEGATIVE: 1

## 2021-10-25 ASSESSMENT — ANXIETY QUESTIONNAIRES
1. FEELING NERVOUS, ANXIOUS, OR ON EDGE: MORE THAN HALF THE DAYS
2. NOT BEING ABLE TO STOP OR CONTROL WORRYING: MORE THAN HALF THE DAYS
7. FEELING AFRAID AS IF SOMETHING AWFUL MIGHT HAPPEN: MORE THAN HALF THE DAYS
3. WORRYING TOO MUCH ABOUT DIFFERENT THINGS: NEARLY EVERY DAY
5. BEING SO RESTLESS THAT IT IS HARD TO SIT STILL: SEVERAL DAYS
6. BECOMING EASILY ANNOYED OR IRRITABLE: MORE THAN HALF THE DAYS
GAD7 TOTAL SCORE: 15
IF YOU CHECKED OFF ANY PROBLEMS ON THIS QUESTIONNAIRE, HOW DIFFICULT HAVE THESE PROBLEMS MADE IT FOR YOU TO DO YOUR WORK, TAKE CARE OF THINGS AT HOME, OR GET ALONG WITH OTHER PEOPLE: EXTREMELY DIFFICULT

## 2021-10-25 ASSESSMENT — PATIENT HEALTH QUESTIONNAIRE - PHQ9
SUM OF ALL RESPONSES TO PHQ QUESTIONS 1-9: 18
5. POOR APPETITE OR OVEREATING: NEARLY EVERY DAY

## 2021-10-25 ASSESSMENT — MIFFLIN-ST. JEOR: SCORE: 2170.75

## 2021-10-25 NOTE — ED PROVIDER NOTES
History     Chief Complaint   Patient presents with     Jaundice     coming from clinic. daily ETOH use     HPI  Oscar Greenberg is a 56 year old male who presents from clinic for further evaluation with concern about jaundice and alcoholic liver disease.  Patient has a history of severe alcohol use disorder, GERD, prior history of saphenous vein thrombophlebitis and peripheral venous insufficiency and adjustment disorder with anxiety.  He is currently prescribed present lorazepam.  On examination patient arrived by car with his wife Maryan from home reporting that over the last 2 to 3 weeks he has had progressive jaundice.  Patient reports he initially had nausea and vomiting for about 2 days that was nonbilious and nonbloody.  Reports has had progressive abdominal distention.  He reports no fever chills has been passing gas.  He had a complicated course after he had his cholecystectomy and required drain placement.  He has had no fever or chills.  Because of jaundice weakness, abdominal distention he presents for further care and evaluation.  Patient reports he is on omeprazole.  He reports he has not drank for about 2 weeks.  His drink of choice is vodka.     Allergies:  Allergies   Allergen Reactions     Nka [No Known Allergies]        Problem List:    Patient Active Problem List    Diagnosis Date Noted     Adjustment disorder with anxious mood 07/22/2019     Priority: Medium     Venous (peripheral) insufficiency 04/09/2019     Priority: Medium     Colon polyps 02/13/2019     Priority: Medium     FINAL DIAGNOSIS:   A.  Right colon, mucosal biopsy:   - Tubular adenoma.   - Negative for high-grade dysplasia and malignancy.     B.  Right colon, mucosal biopsies:   - Several fragments of tubular adenoma.   - Negative for high-grade dysplasia and malignancy     C.  Sigmoid colon, mucosal biopsies:   - Tubular adenoma and hyperplastic polyp.   - Negative for high-grade dysplasia and malignancy.        CARDIOVASCULAR  SCREENING; LDL GOAL LESS THAN 160 10/31/2010     Priority: Medium     Saphenous vein thrombophlebitis 01/12/2009     Priority: Medium     GERD (gastroesophageal reflux disease) 10/23/2008     Priority: Medium     Alcohol abuse 01/11/2007     Priority: Medium     500 ML per day in Jan o7  Chem dep eval recommended       Contact dermatitis and other eczema, due to unspecified cause 01/11/2007     Priority: Medium     Nonspecific elevation of levels of transaminase or lactic acid dehydrogenase (LDH) 01/11/2007     Priority: Medium     Suspect etoh related          Past Medical History:    Past Medical History:   Diagnosis Date     Alcohol abuse        Past Surgical History:    Past Surgical History:   Procedure Laterality Date     COLONOSCOPY N/A 2/8/2019    Procedure: COMBINED COLONOSCOPY, SINGLE OR MULTIPLE BIOPSY/POLYPECTOMY BY BIOPSY;  Surgeon: Toño Escamilla DO;  Location: [facility]     ENDOSCOPIC RETROGRADE CHOLANGIOPANCREATOGRAM N/A 6/7/2016    Procedure: COMBINED ENDOSCOPIC RETROGRADE CHOLANGIOPANCREATOGRAPHY, PLACE TUBE/STENT;  Surgeon: Stephane Hunter MD;  Location: [facility]     LAPAROSCOPIC CHOLECYSTECTOMY WITH CHOLANGIOGRAMS N/A 6/2/2016    Procedure: LAPAROSCOPIC CHOLECYSTECTOMY WITH CHOLANGIOGRAMS;  Surgeon: Darian Mcclain MD;  Location: [facility]     NO HISTORY OF SURGERY         Family History:    Family History   Problem Relation Age of Onset     Unknown/Adopted Maternal Grandmother      C.A.D. Maternal Grandfather      Unknown/Adopted Paternal Grandmother      Cancer Paternal Grandfather      Gallbladder Disease Father      Post-Traumatic Stress Disorder (PTSD) Father        Social History:  Marital Status:   [2]  Social History     Tobacco Use     Smoking status: Never Smoker     Smokeless tobacco: Never Used   Substance Use Topics     Alcohol use: Yes     Alcohol/week: 6.0 standard drinks     Types: 6 Shots of liquor per week     Comment: quit 2 weeks ago     Drug use: No         Medications:    No current outpatient medications on file.        Review of Systems   Constitutional: Positive for activity change and appetite change.   HENT: Negative.    Eyes: Negative.    Respiratory: Negative.    Gastrointestinal: Positive for abdominal distention, nausea and vomiting.   Endocrine: Negative.    Genitourinary: Negative.    Musculoskeletal: Negative.    Skin: Positive for color change (jaundice).   Allergic/Immunologic: Negative.    Neurological: Negative.    Hematological: Negative.    Psychiatric/Behavioral: Negative.    All other systems reviewed and are negative.      Physical Exam   BP: 106/70  Pulse: 100  Temp: 97.2  F (36.2  C)  Weight: 126.1 kg (278 lb)  SpO2: 97 %      Physical Exam  Constitutional:       Appearance: Normal appearance. He is ill-appearing. He is not toxic-appearing or diaphoretic.   HENT:      Head: Normocephalic and atraumatic.      Nose: Nose normal.      Mouth/Throat:      Mouth: Mucous membranes are moist.   Eyes:      General: Scleral icterus present.      Extraocular Movements: Extraocular movements intact.      Pupils: Pupils are equal, round, and reactive to light.   Cardiovascular:      Rate and Rhythm: Tachycardia present.   Pulmonary:      Effort: Pulmonary effort is normal.      Breath sounds: Normal breath sounds.   Abdominal:      General: There is distension.   Musculoskeletal:         General: No swelling, tenderness, deformity or signs of injury.      Cervical back: Normal range of motion and neck supple.      Right lower leg: No edema.      Left lower leg: No edema.   Skin:     Capillary Refill: Capillary refill takes less than 2 seconds.      Coloration: Skin is jaundiced.      Findings: No rash.   Neurological:      General: No focal deficit present.      Mental Status: He is alert and oriented to person, place, and time.      Cranial Nerves: No cranial nerve deficit.      Motor: No weakness.      Coordination: Coordination normal.      Gait: Gait  normal.      Deep Tendon Reflexes: Reflexes normal.   Psychiatric:         Mood and Affect: Mood normal.         Behavior: Behavior normal.         Thought Content: Thought content normal.         Judgment: Judgment normal.         ED Course        Procedures              Critical Care time:  was 60 minutes for this patient excluding procedures.     The patient has signs of Severe Sepsis        If one the following conditions is present, a 30 mL/kg bolus is recommended as part of the 6 hour bundle (IBW can be used for BMI >30, or document refusal/contraindication):      1.   Initial hypotension  defined as 2 bps < 90 or map < 65 in the 6hrs before or 6hrs after time zero.     2.  Lactate >4.     The patient has signs of Severe Sepsis as evidenced by:    1. 2 SIRS criteria, AND  2. Suspected infection, AND   3. Organ dysfunction: Lactic Acidosis with value >2.0    Time severe sepsis diagnosis confirmed:  10/25/21 as this was the time when Lactate resulted, and the level was > 2.0    3 Hour Severe Sepsis Bundle Completion:    1. Initial Lactic Acid Result:   Recent Labs   Lab Test 10/25/21  1327 10/25/21  1027 06/22/16  1758   LACT 2.0 3.4* 2.2*     2. Blood Cultures before Antibiotics: Yes  3. Broad Spectrum Antibiotics Administered:  yes       Anti-infectives (From admission through now)    Start     Dose/Rate Route Frequency Ordered Stop    10/25/21 1105  cefTRIAXone (ROCEPHIN) 2 g vial to attach to  ml bag for ADULTS or NS 50 ml bag for PEDS      2 g  over 30 Minutes Intravenous EVERY 24 HOURS 10/25/21 1102            4. Fluid volume administered in ED:  Full bolus NOT administered due to obese patient - using ideal body weight    BMI Readings from Last 1 Encounters:   10/25/21 35.69 kg/m      30 mL/kg fluids based on weight: 3,780 mL  30 mL/kg fluids based on IBW (must be >= 60 inches tall): 2,470 mL                Severe Sepsis reassessment:  1. Repeat Lactic Acid Level: 2.0  2. MAP>65 after initial IVF  bolus, will continue to monitor fluid status and vital signs            ED medications:  Medications   cefTRIAXone (ROCEPHIN) 2 g vial to attach to  ml bag for ADULTS or NS 50 ml bag for PEDS (0 g Intravenous Stopped 10/25/21 1332)   HYDROmorphone (PF) (DILAUDID) injection 0.5 mg (0.5 mg Intravenous Given 10/25/21 1134)   potassium chloride 10 mEq in 100 mL sterile water intermittent infusion (premix) (0 mEq Intravenous Stopped 10/25/21 1440)   dextrose 5% and 0.45% NaCl + KCl 10 meq/L infusion (has no administration in time range)   ondansetron (ZOFRAN) injection 4 mg (4 mg Intravenous Given 10/25/21 1134)   iopamidol (ISOVUE-370) solution 100 mL (100 mLs Intravenous Given 10/25/21 1238)   sodium chloride 0.9 % bag 500mL for CT scan flush use (74 mLs Intravenous Given 10/25/21 1239)   gadobutrol (GADAVIST) injection 12 mL (12 mLs Intravenous Given 10/25/21 1456)   0.9% sodium chloride BOLUS (0 mLs Intravenous Stopped 10/25/21 1835)   0.9% sodium chloride BOLUS (0 mLs Intravenous Stopped 10/25/21 1440)       ED Vitals:  Vitals:    10/25/21 1445 10/25/21 1725 10/25/21 1730 10/25/21 1835   BP: 107/75   136/77   Pulse:    95   Temp:       TempSrc:       SpO2:  97% 96% 93%   Weight:         Vitals:    10/25/21 1445 10/25/21 1725 10/25/21 1730 10/25/21 1835   BP: 107/75   136/77   Pulse:    95   Temp:       TempSrc:       SpO2:  97% 96% 93%   Weight:         Vitals:    10/25/21 1730 10/25/21 1835 10/25/21 1930 10/25/21 2030   BP:  136/77 124/81 129/72   Pulse:  95 92 85   Temp:       TempSrc:       SpO2: 96% 93%  93%   Weight:           ED labs and imaging:  Results for orders placed or performed during the hospital encounter of 10/25/21   CT Abdomen Pelvis w Contrast     Status: None    Narrative    CT ABDOMEN PELVIS WITH CONTRAST 10/25/2021 12:55 PM    CLINICAL HISTORY: Abdominal distention. History of severe alcohol use  disorder.  Jaundice.  Prior history of complicated cholecystectomy.  Concern for acute liver  injury/failure.  Evaluate for portal vein  thrombosis versus other acute intra-abdominal catastrophe.    TECHNIQUE: CT scan of the abdomen and pelvis was performed following  injection of IV contrast. Multiplanar reformats were obtained. Dose  reduction techniques were used.  CONTRAST: 100 mL Isovue-370    COMPARISON: 8/9/2016 and 7/20/2016.    FINDINGS:   LOWER CHEST: Elevation of the right hemidiaphragm is noted with  atelectasis in the right base. Numerous partially calcified  mediastinal and bilateral hilar lymph nodes are present.    HEPATOBILIARY: Diffusely heterogeneous enhancement is noted through  the liver without focal lesion. Nodular contour of the liver is  consistent with underlying cirrhosis. MRI may be useful for better  evaluation for focal lesions. The gallbladder is absent.    PANCREAS: Normal.    SPLEEN: The spleen is mildly enlarged measuring 18.7 cm in length. No  focal lesions.    ADRENAL GLANDS: Normal.    KIDNEYS/BLADDER: Normal.    BOWEL: Normal.    PELVIC ORGANS: Moderate ascites is seen through the abdomen and  pelvis.    ADDITIONAL FINDINGS: None.    MUSCULOSKELETAL: Degenerative changes are noted at the lumbosacral  junction.      Impression    IMPRESSION:   1.  Moderate ascites through the abdomen and pelvis.  2.  Heterogeneous enhancement diffusely through the liver without  focal lesions. Nodular contours consistent with underlying cirrhosis  and there is splenomegaly indicating portal venous hypertension. MRI  may be useful to better evaluate for focal lesions.  3.  No evidence for biliary dilation.  4.  Elevation of the right hemidiaphragm.  5.  Absent gallbladder.     FUAD RODGERS MD         SYSTEM ID:  [id number]   MR Liver wo & w Contrast     Status: None    Narrative    MR LIVER WITHOUT AND WITH CONTRAST  10/25/2021 3:29 PM     HISTORY: Acute liver failure. History of severe alcohol use disorder.  TECHNIQUE: Liver MRI without and with 12 mL Gadavist IV  contrast.    COMPARISON: CT 10/25/2021.    FINDINGS: Geographic hepatic steatosis with hepatomegaly. Nodular  hepatic contour suggesting cirrhosis. There are no liver lesions.    Small ascites. Mild splenomegaly. Normal pancreas, bile ducts, adrenal  glands, and kidneys. Cholecystectomy.      Impression    IMPRESSION:  1.  Marked hepatic steatosis.  2.  Signs of cirrhosis with portal hypertension including ascites and  splenomegaly.  3.  No liver lesions.    MOSES ESTRADA MD         SYSTEM ID:  [id number]   Basic metabolic panel     Status: Abnormal   Result Value Ref Range    Sodium 121 (L) 133 - 144 mmol/L    Potassium 3.2 (L) 3.4 - 5.3 mmol/L    Chloride 84 (L) 94 - 109 mmol/L    Carbon Dioxide (CO2) 24 20 - 32 mmol/L    Anion Gap 13 3 - 14 mmol/L    Urea Nitrogen 12 7 - 30 mg/dL    Creatinine 1.00 0.66 - 1.25 mg/dL    Calcium 8.3 (L) 8.5 - 10.1 mg/dL    Glucose 122 (H) 70 - 99 mg/dL    GFR Estimate 85 >60 mL/min/1.73m2   Hepatic function panel     Status: Abnormal   Result Value Ref Range    Bilirubin Total 28.1 (HH) 0.2 - 1.3 mg/dL    Bilirubin Direct 25.3 (H) 0.0 - 0.2 mg/dL    Protein Total 7.2 6.8 - 8.8 g/dL    Albumin 1.6 (L) 3.4 - 5.0 g/dL    Alkaline Phosphatase 246 (H) 40 - 150 U/L     (H) 0 - 45 U/L    ALT 49 0 - 70 U/L   INR     Status: Abnormal   Result Value Ref Range    INR 1.94 (H) 0.85 - 1.15   PTT     Status: Abnormal   Result Value Ref Range    aPTT 55 (H) 22 - 38 Seconds   CBC with platelets and differential     Status: Abnormal   Result Value Ref Range    WBC Count 11.6 (H) 4.0 - 11.0 10e3/uL    RBC Count 3.61 (L) 4.40 - 5.90 10e6/uL    Hemoglobin 11.9 (L) 13.3 - 17.7 g/dL    Hematocrit 34.0 (L) 40.0 - 53.0 %    MCV 94 78 - 100 fL    MCH 33.0 26.5 - 33.0 pg    MCHC 35.0 31.5 - 36.5 g/dL    RDW 20.9 (H) 10.0 - 15.0 %    Platelet Count 298 150 - 450 10e3/uL    % Neutrophils 80 %    % Lymphocytes 6 %    % Monocytes 10 %    % Eosinophils 1 %    % Basophils 1 %    % Immature Granulocytes 2 %     NRBCs per 100 WBC 0 <1 /100    Absolute Neutrophils 9.4 (H) 1.6 - 8.3 10e3/uL    Absolute Lymphocytes 0.7 (L) 0.8 - 5.3 10e3/uL    Absolute Monocytes 1.1 0.0 - 1.3 10e3/uL    Absolute Eosinophils 0.1 0.0 - 0.7 10e3/uL    Absolute Basophils 0.1 0.0 - 0.2 10e3/uL    Absolute Immature Granulocytes 0.2 (H) <=0.0 10e3/uL    Absolute NRBCs 0.0 10e3/uL   Lactic acid whole blood     Status: Abnormal   Result Value Ref Range    Lactic Acid 3.4 (H) 0.7 - 2.0 mmol/L   Lipase     Status: Normal   Result Value Ref Range    Lipase 325 73 - 393 U/L   Extra Red Top Tube     Status: None   Result Value Ref Range    Hold Specimen JIC    Alcohol level blood     Status: Normal   Result Value Ref Range    Alcohol ethyl <0.01 <=0.01 g/dL   Lactic acid whole blood     Status: Normal   Result Value Ref Range    Lactic Acid 2.0 0.7 - 2.0 mmol/L   Asymptomatic COVID-19 Virus (Coronavirus) by PCR Nasopharyngeal     Status: Normal    Specimen: Nasopharyngeal; Swab   Result Value Ref Range    SARS CoV2 PCR Negative Negative    Narrative    Testing was performed using the norma  SARS-CoV-2 & Influenza A/B Assay on the norma  Sara  System.  This test should be ordered for the detection of SARS-COV-2 in individuals who meet SARS-CoV-2 clinical and/or epidemiological criteria. Test performance is unknown in asymptomatic patients.  This test is for in vitro diagnostic use under the FDA EUA for laboratories certified under CLIA to perform moderate and/or high complexity testing. This test has not been FDA cleared or approved.  A negative test does not rule out the presence of PCR inhibitors in the specimen or target RNA in concentration below the limit of detection for the assay. The possibility of a false negative should be considered if the patient's recent exposure or clinical presentation suggests COVID-19.  [facility] are certified under the Clinical Laboratory Improvement Amendments of 1988 (CLIA-88) as qualified to  perform moderate and/or high complexity laboratory testing.   Albumin level     Status: Abnormal   Result Value Ref Range    Albumin 1.7 (L) 3.4 - 5.0 g/dL   Acetaminophen level     Status: Abnormal   Result Value Ref Range    Acetaminophen <2 (L) 10 - 30 mg/L   Basic metabolic panel     Status: Abnormal   Result Value Ref Range    Sodium 121 (L) 133 - 144 mmol/L    Potassium 3.1 (L) 3.4 - 5.3 mmol/L    Chloride 85 (L) 94 - 109 mmol/L    Carbon Dioxide (CO2) 24 20 - 32 mmol/L    Anion Gap 12 3 - 14 mmol/L    Urea Nitrogen 15 7 - 30 mg/dL    Creatinine 1.19 0.66 - 1.25 mg/dL    Calcium 7.9 (L) 8.5 - 10.1 mg/dL    Glucose 132 (H) 70 - 99 mg/dL    GFR Estimate 69 >60 mL/min/1.73m2   CBC with Platelets & Differential     Status: Abnormal    Narrative    The following orders were created for panel order CBC with Platelets & Differential.  Procedure                               Abnormality         Status                     ---------                               -----------         ------                     CBC with platelets and d...[[id number]]  Abnormal            Final result                 Please view results for these tests on the individual orders.   [place] Draw *Canceled*     Status: None ()    Narrative    The following orders were created for panel order [place] Draw.  Procedure                               Abnormality         Status                     ---------                               -----------         ------                       Please view results for these tests on the individual orders.   [place] Draw *Canceled*     Status: None ()    Narrative    The following orders were created for panel order [place] Draw.  Procedure                               Abnormality         Status                     ---------                               -----------         ------                     Extra Blue Top Tube[[id number]]                                                         Extra Green Top  (Lithium...[[id number]]                                                   Please view results for these tests on the individual orders.   [place] Draw     Status: None    Narrative    The following orders were created for panel order [place] Draw.  Procedure                               Abnormality         Status                     ---------                               -----------         ------                     Extra Red Top Tube[[id number]]                               Final result                 Please view results for these tests on the individual orders.         Assessments & Plan (with Medical Decision Making)   Assessment Summary and Clinical Impression: 56-year-old male who presented to the department with painless jaundice, abdominal distention causing pain with history of severe alcohol use disorder.  Patient reported that he had been abstinent from alcohol for about 2 weeks after he began feeling poorly with nausea and vomiting.  He arrived without fever and was normotensive but was tachycardic at rest.  He has jaundice with scleral icterus.  He reported no melena or hematochezia and no bilious vomiting or coffee-ground emesis.  Due to concern for alcoholic liver disease a broad work-up was initiated.  He had an elevated lactate on arrival and blood cultures were obtained he was started empirically on IV antibiotics his lactic was trended he was given fluids abdominal imaging was obtained due to competing diagnosis to exclude intra-abdominal catastrophe. His work-up suggest alcoholic liver disease with cirrhosis, query severe alcoholic hepatitis and moderate ascites. He is boarded in the department pending a bed becoming available.    ED course and Plan:  Reviewed the medical record.  Reviewed his evaluation in the department on November 12, 2020, March 24, 2021.  Patient has a prior history of elevated liver enzymes for June 23, 2021.  Last abdominal imaging was from August 9, 2016.  Broad  differential considered including spontaneous bacterial peritonitis , acute hepatitis, alcoholic liver failure/injury. Patient's work-up on arrival revealed hyponatremia.  Elevated lactate- normalized n during his ED course. Normal lipase.  His total bilirubin is 28.1.  Patient's alk phos today is 246.  It was 199 on June 23, 2021.  His AST is 209, his ALT is 49.  Direct bilirubin is 25.3.  White count was 11.6.  Potassium was 3.2 and this was repleted.   Negative COVID-19 test. Negative tylenol.  Imaging with contrast was obtained to evaluate for competing diagnosis.  Patient's CT revealed moderate ascites to the abdomen pelvis with heterogeneous enhancement diffusely through the liver without focal lesions.  Nodular contours consistent with underlying cirrhosis there is splenomegaly indicating portal venous hypertension.  MRI was advised as this may be helpful to better evaluate for focal lesions.  There is no evidence of biliary dilatation and an absent gallbladder.  See details in the radiology report.     We discussed the role of paracentesis for symptom management  given modest ascites and abdominal distention. We also reviewed the risk including bleeding given his INR 1.9. Patient elected to hold on paracentesis at this time which I think is reasonable as my clinical suspicion for spontaneous bacterial peritonitis is low.    MRI did not identify any liver lesions and confirmed findings are liver cirrhosis with portal hypertension and moderate ascites.    If patient is boarding the department for prolonged period of time I did place an  order for paracentesis that could be completed by radiology on an as needed basis depending on ED course. There is a chance patient may have paracentesis- (diagnostic paracentesis)- with first paracentesis) done at 8AM on 10/26/21 if he is still in the department.. AM- basic metabolic profile pending.    I updated spouse- Maryan by phone. can be reached at  [phone].      ADDENDUM on 10/25/21 :  I spoke with Dr. Salazar- (GI hepatology at the [facility])at 7.20pm-  To review if any additional interventions or therapies would be required while patient is boarding in the department. Dr. Salazar-advised a diagnostic paracentesis.  Avoid large volume therapeutic paracentesis given hyponatremia (however if sodium improving) may consider large-volume paracentesis for symptom management as needed.  She agreed with current pending peritoneal fluid analysis.  Avoid diuretics at this time.  No indication for rifaximin or lactulose as patient is not altered. Consider steroids but not indicated pending further evaluation and labs. Dr Salazar is available for additional follow-up if needed. Working diagnosis is severe alcoholic hepatitis, less likely acute alcoholic liver failure. Viral hepatitis serology,.    With working diagnosis of severe alcoholic hepatitis after discussion with GI on-call at the [facility] a bed became available here at [facility].  I spoke with the admitting provider- JAMIR Arana PA-C at  8.05pm to review if it was reasonable admit the patient [facility] with GI consultation available through the [facility].  After discussion with Dr Cameron-who advised that patient be transferred to higher level of care where GI and hepatology consultation available.    At shift end at 8.30 pm patient signed out to Dr EUN Donohue awaiting a bed with request to transfer by the hospitalist service at [facility] to higher level of care for further expert consultation. Fluid management  and close monitoring of electrolyte abnromality. Jerri, POsm pending. AM- BMP and INR.    Disclaimer: This note consists of symbols derived from keyboarding, dictation and/or voice recognition software. As a result, there may be errors in the script that have gone undetected. Please consider this when interpreting information found in this chart.  I have reviewed the nursing notes.    I have reviewed  the findings, diagnosis, plan and need for follow up with the patient.       New Prescriptions    No medications on file       Final diagnoses:   Alcoholic liver disease (H)   Abnormal CT of the abdomen - Showing modest ascites, portal venous hypertension, splenomegaly, and liver cirrhosis   Hyponatremia   Jaundice   Alcoholic hepatitis with ascites       10/25/2021   [facility] EMERGENCY DEPT     Chemo Merritt MD  10/25/21 1903       Chemo Merritt MD  10/25/21 2034       Chemo Merritt MD  10/25/21 2055

## 2021-10-25 NOTE — ED TRIAGE NOTES
Pt sent from clinic for eval of severe jaundice. C/o abd pain, started 2 weeks ago. Jaundice started 3 weeks ago. Pt had gallbladder removed 5 years ago.

## 2021-10-26 ENCOUNTER — TELEPHONE (OUTPATIENT)
Dept: FAMILY MEDICINE | Facility: CLINIC | Age: 56
End: 2021-10-26

## 2021-10-26 VITALS
TEMPERATURE: 97.2 F | WEIGHT: 278 LBS | HEART RATE: 80 BPM | DIASTOLIC BLOOD PRESSURE: 75 MMHG | BODY MASS INDEX: 35.69 KG/M2 | OXYGEN SATURATION: 92 % | SYSTOLIC BLOOD PRESSURE: 118 MMHG

## 2021-10-26 LAB
CMV DNA SPEC NAA+PROBE-ACNC: NOT DETECTED IU/ML
ENTEROCOCCUS FAECALIS: NOT DETECTED
ENTEROCOCCUS FAECIUM: NOT DETECTED
HAV IGM SERPL QL IA: NONREACTIVE
HBV CORE IGM SERPL QL IA: NONREACTIVE
HBV SURFACE AB SERPL IA-ACNC: 0.21 M[IU]/ML
HCV AB SERPL QL IA: NONREACTIVE
LACTATE SERPL-SCNC: 2 MMOL/L (ref 0.7–2)
LACTATE SERPL-SCNC: 3.4 MMOL/L (ref 0.7–2)
LISTERIA SPECIES (DETECTED/NOT DETECTED): NOT DETECTED
OSMOLALITY SERPL: 254 MMOL/KG (ref 275–295)
SODIUM UR-SCNC: <5 MMOL/L
STAPHYLOCOCCUS AUREUS: DETECTED
STAPHYLOCOCCUS EPIDERMIDIS: NOT DETECTED
STAPHYLOCOCCUS LUGDUNENSIS: NOT DETECTED
STREPTOCOCCUS AGALACTIAE: NOT DETECTED
STREPTOCOCCUS ANGINOSUS GROUP: NOT DETECTED
STREPTOCOCCUS PNEUMONIAE: NOT DETECTED
STREPTOCOCCUS PYOGENES: NOT DETECTED
STREPTOCOCCUS SPECIES: NOT DETECTED

## 2021-10-26 PROCEDURE — 84300 ASSAY OF URINE SODIUM: CPT | Performed by: EMERGENCY MEDICINE

## 2021-10-26 ASSESSMENT — ANXIETY QUESTIONNAIRES: GAD7 TOTAL SCORE: 15

## 2021-10-26 NOTE — ED NOTES
DATE:  10/26/2021   TIME OF RECEIPT FROM LAB:  2:42 AM  LAB TEST:  Blood culture  LAB VALUE:  +gram cocci  RESULTS GIVEN WITH READ-BACK TO (PROVIDER):  Derrick Cornell MD  TIME LAB VALUE REPORTED TO PROVIDER:   2:42 AM

## 2021-10-26 NOTE — ED PROVIDER NOTES
Emergency Department Patient Sign-out       Brief HPI:  This is a 56 year old male signed out to me by Dr. Jacobson at the end of his shift at 7:00 AM.  See initial ED Provider note for details of the presentation.         Significant Events prior to my assuming care: Laboratory evaluation, CT of the abdomen/pelvis with IV contrast, Hepatology service consultation, bed search in progress.  No beds are available within the [facility].      Exam:   Patient Vitals for the past 24 hrs:   BP Temp Temp src Pulse SpO2 Weight   10/25/21 1835 136/77 -- -- 95 93 % --   10/25/21 1730 -- -- -- -- 96 % --   10/25/21 1725 -- -- -- -- 97 % --   10/25/21 1445 107/75 -- -- -- -- --   10/25/21 1415 133/76 -- -- -- -- --   10/25/21 1400 122/75 -- -- 91 -- --   10/25/21 1345 110/70 -- -- -- -- --   10/25/21 1330 104/71 -- -- -- -- --   10/25/21 1215 102/69 -- -- -- 94 % --   10/25/21 1130 -- -- -- -- 93 % --   10/25/21 1115 109/70 -- -- -- 94 % --   10/25/21 1100 100/69 -- -- 101 94 % --   10/25/21 1045 100/68 -- -- -- 95 % --   10/25/21 1033 -- -- -- -- 97 % --   10/25/21 1031 108/70 -- -- 101 96 % --   10/25/21 1004 106/70 97.2  F (36.2  C) Temporal 100 97 % 126.1 kg (278 lb)           ED RESULTS:   Results for orders placed or performed during the hospital encounter of 10/25/21 (from the past 24 hour(s))   [facility] Draw *Canceled*     Status: None ()    Collection Time: 10/25/21 10:26 AM    Narrative    The following orders were created for panel order [facility] Draw.  Procedure                               Abnormality         Status                     ---------                               -----------         ------                       Please view results for these tests on the individual orders.   CBC with Platelets & Differential     Status: Abnormal    Collection Time: 10/25/21 10:27 AM    Narrative    The following orders were created for panel order CBC with Platelets & Differential.  Procedure                                Abnormality         Status                     ---------                               -----------         ------                     CBC with platelets and d...[[id number]]  Abnormal            Final result                 Please view results for these tests on the individual orders.   Basic metabolic panel     Status: Abnormal    Collection Time: 10/25/21 10:27 AM   Result Value Ref Range    Sodium 121 (L) 133 - 144 mmol/L    Potassium 3.2 (L) 3.4 - 5.3 mmol/L    Chloride 84 (L) 94 - 109 mmol/L    Carbon Dioxide (CO2) 24 20 - 32 mmol/L    Anion Gap 13 3 - 14 mmol/L    Urea Nitrogen 12 7 - 30 mg/dL    Creatinine 1.00 0.66 - 1.25 mg/dL    Calcium 8.3 (L) 8.5 - 10.1 mg/dL    Glucose 122 (H) 70 - 99 mg/dL    GFR Estimate 85 >60 mL/min/1.73m2   Hepatic function panel     Status: Abnormal    Collection Time: 10/25/21 10:27 AM   Result Value Ref Range    Bilirubin Total 28.1 (HH) 0.2 - 1.3 mg/dL    Bilirubin Direct 25.3 (H) 0.0 - 0.2 mg/dL    Protein Total 7.2 6.8 - 8.8 g/dL    Albumin 1.6 (L) 3.4 - 5.0 g/dL    Alkaline Phosphatase 246 (H) 40 - 150 U/L     (H) 0 - 45 U/L    ALT 49 0 - 70 U/L   INR     Status: Abnormal    Collection Time: 10/25/21 10:27 AM   Result Value Ref Range    INR 1.94 (H) 0.85 - 1.15   PTT     Status: Abnormal    Collection Time: 10/25/21 10:27 AM   Result Value Ref Range    aPTT 55 (H) 22 - 38 Seconds   CBC with platelets and differential     Status: Abnormal    Collection Time: 10/25/21 10:27 AM   Result Value Ref Range    WBC Count 11.6 (H) 4.0 - 11.0 10e3/uL    RBC Count 3.61 (L) 4.40 - 5.90 10e6/uL    Hemoglobin 11.9 (L) 13.3 - 17.7 g/dL    Hematocrit 34.0 (L) 40.0 - 53.0 %    MCV 94 78 - 100 fL    MCH 33.0 26.5 - 33.0 pg    MCHC 35.0 31.5 - 36.5 g/dL    RDW 20.9 (H) 10.0 - 15.0 %    Platelet Count 298 150 - 450 10e3/uL    % Neutrophils 80 %    % Lymphocytes 6 %    % Monocytes 10 %    % Eosinophils 1 %    % Basophils 1 %    % Immature Granulocytes 2 %    NRBCs per 100 WBC 0 <1  /100    Absolute Neutrophils 9.4 (H) 1.6 - 8.3 10e3/uL    Absolute Lymphocytes 0.7 (L) 0.8 - 5.3 10e3/uL    Absolute Monocytes 1.1 0.0 - 1.3 10e3/uL    Absolute Eosinophils 0.1 0.0 - 0.7 10e3/uL    Absolute Basophils 0.1 0.0 - 0.2 10e3/uL    Absolute Immature Granulocytes 0.2 (H) <=0.0 10e3/uL    Absolute NRBCs 0.0 10e3/uL   [place] Draw *Canceled*     Status: None ()    Collection Time: 10/25/21 10:27 AM    Narrative    The following orders were created for panel order [place] Draw.  Procedure                               Abnormality         Status                     ---------                               -----------         ------                     Extra Blue Top Tube[[id number]]                                                         Extra Green Top (Lithium...[[id number]]                                                   Please view results for these tests on the individual orders.   Lactic acid whole blood     Status: Abnormal    Collection Time: 10/25/21 10:27 AM   Result Value Ref Range    Lactic Acid 3.4 (H) 0.7 - 2.0 mmol/L   Lipase     Status: Normal    Collection Time: 10/25/21 10:27 AM   Result Value Ref Range    Lipase 325 73 - 393 U/L   [place] Draw     Status: None    Collection Time: 10/25/21 10:29 AM    Narrative    The following orders were created for panel order [place] Draw.  Procedure                               Abnormality         Status                     ---------                               -----------         ------                     Extra Red Top Tube[[id number]]                               Final result                 Please view results for these tests on the individual orders.   Extra Red Top Tube     Status: None    Collection Time: 10/25/21 10:29 AM   Result Value Ref Range    Hold Specimen JIC    Alcohol level blood     Status: Normal    Collection Time: 10/25/21 10:29 AM   Result Value Ref Range    Alcohol ethyl <0.01 <=0.01 g/dL   CT Abdomen Pelvis w Contrast      Status: None    Collection Time: 10/25/21 12:55 PM    Narrative    CT ABDOMEN PELVIS WITH CONTRAST 10/25/2021 12:55 PM    CLINICAL HISTORY: Abdominal distention. History of severe alcohol use  disorder.  Jaundice.  Prior history of complicated cholecystectomy.  Concern for acute liver injury/failure.  Evaluate for portal vein  thrombosis versus other acute intra-abdominal catastrophe.    TECHNIQUE: CT scan of the abdomen and pelvis was performed following  injection of IV contrast. Multiplanar reformats were obtained. Dose  reduction techniques were used.  CONTRAST: 100 mL Isovue-370    COMPARISON: 8/9/2016 and 7/20/2016.    FINDINGS:   LOWER CHEST: Elevation of the right hemidiaphragm is noted with  atelectasis in the right base. Numerous partially calcified  mediastinal and bilateral hilar lymph nodes are present.    HEPATOBILIARY: Diffusely heterogeneous enhancement is noted through  the liver without focal lesion. Nodular contour of the liver is  consistent with underlying cirrhosis. MRI may be useful for better  evaluation for focal lesions. The gallbladder is absent.    PANCREAS: Normal.    SPLEEN: The spleen is mildly enlarged measuring 18.7 cm in length. No  focal lesions.    ADRENAL GLANDS: Normal.    KIDNEYS/BLADDER: Normal.    BOWEL: Normal.    PELVIC ORGANS: Moderate ascites is seen through the abdomen and  pelvis.    ADDITIONAL FINDINGS: None.    MUSCULOSKELETAL: Degenerative changes are noted at the lumbosacral  junction.      Impression    IMPRESSION:   1.  Moderate ascites through the abdomen and pelvis.  2.  Heterogeneous enhancement diffusely through the liver without  focal lesions. Nodular contours consistent with underlying cirrhosis  and there is splenomegaly indicating portal venous hypertension. MRI  may be useful to better evaluate for focal lesions.  3.  No evidence for biliary dilation.  4.  Elevation of the right hemidiaphragm.  5.  Absent gallbladder.     FUAD RODGERS MD          SYSTEM ID:  [id number]   Lactic acid whole blood     Status: Normal    Collection Time: 10/25/21  1:27 PM   Result Value Ref Range    Lactic Acid 2.0 0.7 - 2.0 mmol/L   MR Liver wo & w Contrast     Status: None    Collection Time: 10/25/21  3:29 PM    Narrative    MR LIVER WITHOUT AND WITH CONTRAST  10/25/2021 3:29 PM     HISTORY: Acute liver failure. History of severe alcohol use disorder.  TECHNIQUE: Liver MRI without and with 12 mL Gadavist IV contrast.    COMPARISON: CT 10/25/2021.    FINDINGS: Geographic hepatic steatosis with hepatomegaly. Nodular  hepatic contour suggesting cirrhosis. There are no liver lesions.    Small ascites. Mild splenomegaly. Normal pancreas, bile ducts, adrenal  glands, and kidneys. Cholecystectomy.      Impression    IMPRESSION:  1.  Marked hepatic steatosis.  2.  Signs of cirrhosis with portal hypertension including ascites and  splenomegaly.  3.  No liver lesions.    MOSES ESTRADA MD         SYSTEM ID:  [id number]   Asymptomatic COVID-19 Virus (Coronavirus) by PCR Nasopharyngeal     Status: Normal    Collection Time: 10/25/21  4:40 PM    Specimen: Nasopharyngeal; Swab   Result Value Ref Range    SARS CoV2 PCR Negative Negative    Narrative    Testing was performed using the norma  SARS-CoV-2 & Influenza A/B Assay on the norma  Sara  System.  This test should be ordered for the detection of SARS-COV-2 in individuals who meet SARS-CoV-2 clinical and/or epidemiological criteria. Test performance is unknown in asymptomatic patients.  This test is for in vitro diagnostic use under the FDA EUA for laboratories certified under CLIA to perform moderate and/or high complexity testing. This test has not been FDA cleared or approved.  A negative test does not rule out the presence of PCR inhibitors in the specimen or target RNA in concentration below the limit of detection for the assay. The possibility of a false negative should be considered if the patient's recent exposure or clinical  presentation suggests COVID-19.  [facility] are certified under the Clinical Laboratory Improvement Amendments of 1988 (CLIA-88) as qualified to perform moderate and/or high complexity laboratory testing.       ED MEDICATIONS:   Medications   cefTRIAXone (ROCEPHIN) 2 g vial to attach to  ml bag for ADULTS or NS 50 ml bag for PEDS (0 g Intravenous Stopped 10/25/21 1332)   HYDROmorphone (PF) (DILAUDID) injection 0.5 mg (0.5 mg Intravenous Given 10/25/21 1134)   potassium chloride 10 mEq in 100 mL sterile water intermittent infusion (premix) (0 mEq Intravenous Stopped 10/25/21 1440)   dextrose 5% and 0.9% NaCl infusion (has no administration in time range)   ondansetron (ZOFRAN) injection 4 mg (4 mg Intravenous Given 10/25/21 1134)   iopamidol (ISOVUE-370) solution 100 mL (100 mLs Intravenous Given 10/25/21 1238)   sodium chloride 0.9 % bag 500mL for CT scan flush use (74 mLs Intravenous Given 10/25/21 1239)   gadobutrol (GADAVIST) injection 12 mL (12 mLs Intravenous Given 10/25/21 1456)   0.9% sodium chloride BOLUS (0 mLs Intravenous Stopped 10/25/21 1835)         Impression:    ICD-10-CM    1. Alcoholic liver disease (H)  K70.9    2. Acute liver failure without hepatic coma  K72.00    3. Abnormal CT of the abdomen  R93.5     Showing modest ascites, portal venous hypertension, splenomegaly, and liver cirrhosis   4. Hyponatremia  E87.1    5. Jaundice  R17        Plan:    Pending studies include: Hepatitis screening and a.m. sodium, and paracentesis evaluation to be performed in the a.m. if patient still here.      1:45 AM - A bed has become available at .  Care of the patient was transferred to Dr. Ortega at the end of my shift.  Patient has been stable throughout my care of him and he has no acute complaints or concerns.  I updated him of the bed availability at  and he is amenable to transfer there.  Dr. Ortega will speak with the admitting  service there.             Will Donohue MD  10/26/21 0153

## 2021-10-26 NOTE — TELEPHONE ENCOUNTER
Dr. Segovia, hospitalist with [facility], reported that patient has been admitted to their facility and he called to inquire about blood culture results that were drawn yesterday 10/25/21. Provided with results, and he inquired if there were any further specific details noted. Writer transferred call to lab to further assist with his questions.     Natalya Gandhi RN  [facility]

## 2021-10-26 NOTE — ED NOTES
Lab called with positive blood culture left arm at 0620 10/26/21  Result staph aureus  Lab going to call [facility] also where patient was transferred to

## 2021-10-26 NOTE — ED PROVIDER NOTES
Emergency Department Patient Sign-out       Brief HPI:  This is a 56 year old male signed out to me by Dr. Donohue (from Dr Merritt) .  See initial ED Provider note for details of the presentation.            Significant Events prior to my assuming care: none      Exam:   Patient Vitals for the past 24 hrs:   BP Temp Temp src Pulse SpO2 Weight   10/26/21 0058 -- -- -- -- 93 % --   10/26/21 0000 106/61 -- -- 85 96 % --   10/25/21 2347 -- -- -- -- 96 % --   10/25/21 2346 -- -- -- -- 96 % --   10/25/21 2345 -- -- -- -- 96 % --   10/25/21 2251 -- -- -- -- 93 % --   10/25/21 2248 -- -- -- -- (!) 87 % --   10/25/21 2200 (!) 140/89 -- -- 94 95 % --   10/25/21 2130 (!) 139/92 -- -- 94 97 % --   10/25/21 2100 132/74 -- -- 91 95 % --   10/25/21 2030 129/72 -- -- 85 93 % --   10/25/21 1930 124/81 -- -- 92 -- --   10/25/21 1835 136/77 -- -- 95 93 % --   10/25/21 1730 -- -- -- -- 96 % --   10/25/21 1725 -- -- -- -- 97 % --   10/25/21 1445 107/75 -- -- -- -- --   10/25/21 1415 133/76 -- -- -- -- --   10/25/21 1400 122/75 -- -- 91 -- --   10/25/21 1345 110/70 -- -- -- -- --   10/25/21 1330 104/71 -- -- -- -- --   10/25/21 1215 102/69 -- -- -- 94 % --   10/25/21 1130 -- -- -- -- 93 % --   10/25/21 1115 109/70 -- -- -- 94 % --   10/25/21 1100 100/69 -- -- 101 94 % --   10/25/21 1045 100/68 -- -- -- 95 % --   10/25/21 1033 -- -- -- -- 97 % --   10/25/21 1031 108/70 -- -- 101 96 % --   10/25/21 1004 106/70 97.2  F (36.2  C) Temporal 100 97 % 126.1 kg (278 lb)           ED RESULTS:   Results for orders placed or performed during the hospital encounter of 10/25/21 (from the past 24 hour(s))   [facility] Draw *Canceled*     Status: None ()    Collection Time: 10/25/21 10:26 AM    Narrative    The following orders were created for panel order [facility].  Procedure                               Abnormality         Status                     ---------                               -----------         ------                       Please  view results for these tests on the individual orders.   CBC with Platelets & Differential     Status: Abnormal    Collection Time: 10/25/21 10:27 AM    Narrative    The following orders were created for panel order CBC with Platelets & Differential.  Procedure                               Abnormality         Status                     ---------                               -----------         ------                     CBC with platelets and d...[[id number]]  Abnormal            Final result                 Please view results for these tests on the individual orders.   Basic metabolic panel     Status: Abnormal    Collection Time: 10/25/21 10:27 AM   Result Value Ref Range    Sodium 121 (L) 133 - 144 mmol/L    Potassium 3.2 (L) 3.4 - 5.3 mmol/L    Chloride 84 (L) 94 - 109 mmol/L    Carbon Dioxide (CO2) 24 20 - 32 mmol/L    Anion Gap 13 3 - 14 mmol/L    Urea Nitrogen 12 7 - 30 mg/dL    Creatinine 1.00 0.66 - 1.25 mg/dL    Calcium 8.3 (L) 8.5 - 10.1 mg/dL    Glucose 122 (H) 70 - 99 mg/dL    GFR Estimate 85 >60 mL/min/1.73m2   Hepatic function panel     Status: Abnormal    Collection Time: 10/25/21 10:27 AM   Result Value Ref Range    Bilirubin Total 28.1 (HH) 0.2 - 1.3 mg/dL    Bilirubin Direct 25.3 (H) 0.0 - 0.2 mg/dL    Protein Total 7.2 6.8 - 8.8 g/dL    Albumin 1.6 (L) 3.4 - 5.0 g/dL    Alkaline Phosphatase 246 (H) 40 - 150 U/L     (H) 0 - 45 U/L    ALT 49 0 - 70 U/L   INR     Status: Abnormal    Collection Time: 10/25/21 10:27 AM   Result Value Ref Range    INR 1.94 (H) 0.85 - 1.15   PTT     Status: Abnormal    Collection Time: 10/25/21 10:27 AM   Result Value Ref Range    aPTT 55 (H) 22 - 38 Seconds   CBC with platelets and differential     Status: Abnormal    Collection Time: 10/25/21 10:27 AM   Result Value Ref Range    WBC Count 11.6 (H) 4.0 - 11.0 10e3/uL    RBC Count 3.61 (L) 4.40 - 5.90 10e6/uL    Hemoglobin 11.9 (L) 13.3 - 17.7 g/dL    Hematocrit 34.0 (L) 40.0 - 53.0 %    MCV 94 78 - 100 fL     MCH 33.0 26.5 - 33.0 pg    MCHC 35.0 31.5 - 36.5 g/dL    RDW 20.9 (H) 10.0 - 15.0 %    Platelet Count 298 150 - 450 10e3/uL    % Neutrophils 80 %    % Lymphocytes 6 %    % Monocytes 10 %    % Eosinophils 1 %    % Basophils 1 %    % Immature Granulocytes 2 %    NRBCs per 100 WBC 0 <1 /100    Absolute Neutrophils 9.4 (H) 1.6 - 8.3 10e3/uL    Absolute Lymphocytes 0.7 (L) 0.8 - 5.3 10e3/uL    Absolute Monocytes 1.1 0.0 - 1.3 10e3/uL    Absolute Eosinophils 0.1 0.0 - 0.7 10e3/uL    Absolute Basophils 0.1 0.0 - 0.2 10e3/uL    Absolute Immature Granulocytes 0.2 (H) <=0.0 10e3/uL    Absolute NRBCs 0.0 10e3/uL   [facility] Draw *Canceled*     Status: None ()    Collection Time: 10/25/21 10:27 AM    Narrative    The following orders were created for panel order [facility] Draw.  Procedure                               Abnormality         Status                     ---------                               -----------         ------                     Extra Blue Top Tube[[id number]]                                                         Extra Green Top (Lithium...[[id number]]                                                   Please view results for these tests on the individual orders.   Lactic acid whole blood     Status: Abnormal    Collection Time: 10/25/21 10:27 AM   Result Value Ref Range    Lactic Acid 3.4 (H) 0.7 - 2.0 mmol/L   Lipase     Status: Normal    Collection Time: 10/25/21 10:27 AM   Result Value Ref Range    Lipase 325 73 - 393 U/L   [facility] Draw     Status: None    Collection Time: 10/25/21 10:29 AM    Narrative    The following orders were created for panel order [facility] Draw.  Procedure                               Abnormality         Status                     ---------                               -----------         ------                     Extra Red Top Tube[[id number]]                               Final result                 Please view results for these tests on the individual orders.   Extra Red Top  Tube     Status: None    Collection Time: 10/25/21 10:29 AM   Result Value Ref Range    Hold Specimen JIC    Alcohol level blood     Status: Normal    Collection Time: 10/25/21 10:29 AM   Result Value Ref Range    Alcohol ethyl <0.01 <=0.01 g/dL   Albumin level     Status: Abnormal    Collection Time: 10/25/21 10:29 AM   Result Value Ref Range    Albumin 1.7 (L) 3.4 - 5.0 g/dL   Acetaminophen level     Status: Abnormal    Collection Time: 10/25/21 10:29 AM   Result Value Ref Range    Acetaminophen <2 (L) 10 - 30 mg/L   CT Abdomen Pelvis w Contrast     Status: None    Collection Time: 10/25/21 12:55 PM    Narrative    CT ABDOMEN PELVIS WITH CONTRAST 10/25/2021 12:55 PM    CLINICAL HISTORY: Abdominal distention. History of severe alcohol use  disorder.  Jaundice.  Prior history of complicated cholecystectomy.  Concern for acute liver injury/failure.  Evaluate for portal vein  thrombosis versus other acute intra-abdominal catastrophe.    TECHNIQUE: CT scan of the abdomen and pelvis was performed following  injection of IV contrast. Multiplanar reformats were obtained. Dose  reduction techniques were used.  CONTRAST: 100 mL Isovue-370    COMPARISON: 8/9/2016 and 7/20/2016.    FINDINGS:   LOWER CHEST: Elevation of the right hemidiaphragm is noted with  atelectasis in the right base. Numerous partially calcified  mediastinal and bilateral hilar lymph nodes are present.    HEPATOBILIARY: Diffusely heterogeneous enhancement is noted through  the liver without focal lesion. Nodular contour of the liver is  consistent with underlying cirrhosis. MRI may be useful for better  evaluation for focal lesions. The gallbladder is absent.    PANCREAS: Normal.    SPLEEN: The spleen is mildly enlarged measuring 18.7 cm in length. No  focal lesions.    ADRENAL GLANDS: Normal.    KIDNEYS/BLADDER: Normal.    BOWEL: Normal.    PELVIC ORGANS: Moderate ascites is seen through the abdomen and  pelvis.    ADDITIONAL FINDINGS:  None.    MUSCULOSKELETAL: Degenerative changes are noted at the lumbosacral  junction.      Impression    IMPRESSION:   1.  Moderate ascites through the abdomen and pelvis.  2.  Heterogeneous enhancement diffusely through the liver without  focal lesions. Nodular contours consistent with underlying cirrhosis  and there is splenomegaly indicating portal venous hypertension. MRI  may be useful to better evaluate for focal lesions.  3.  No evidence for biliary dilation.  4.  Elevation of the right hemidiaphragm.  5.  Absent gallbladder.     FUAD RODGERS MD         SYSTEM ID:  [id number]   Lactic acid whole blood     Status: Normal    Collection Time: 10/25/21  1:27 PM   Result Value Ref Range    Lactic Acid 2.0 0.7 - 2.0 mmol/L   MR Liver wo & w Contrast     Status: None    Collection Time: 10/25/21  3:29 PM    Narrative    MR LIVER WITHOUT AND WITH CONTRAST  10/25/2021 3:29 PM     HISTORY: Acute liver failure. History of severe alcohol use disorder.  TECHNIQUE: Liver MRI without and with 12 mL Gadavist IV contrast.    COMPARISON: CT 10/25/2021.    FINDINGS: Geographic hepatic steatosis with hepatomegaly. Nodular  hepatic contour suggesting cirrhosis. There are no liver lesions.    Small ascites. Mild splenomegaly. Normal pancreas, bile ducts, adrenal  glands, and kidneys. Cholecystectomy.      Impression    IMPRESSION:  1.  Marked hepatic steatosis.  2.  Signs of cirrhosis with portal hypertension including ascites and  splenomegaly.  3.  No liver lesions.    MOSES ESTRADA MD         SYSTEM ID:  [id number]   Asymptomatic COVID-19 Virus (Coronavirus) by PCR Nasopharyngeal     Status: Normal    Collection Time: 10/25/21  4:40 PM    Specimen: Nasopharyngeal; Swab   Result Value Ref Range    SARS CoV2 PCR Negative Negative    Narrative    Testing was performed using the norma  SARS-CoV-2 & Influenza A/B Assay on the norma  Sara  System.  This test should be ordered for the detection of SARS-COV-2 in individuals who  meet SARS-CoV-2 clinical and/or epidemiological criteria. Test performance is unknown in asymptomatic patients.  This test is for in vitro diagnostic use under the FDA EUA for laboratories certified under CLIA to perform moderate and/or high complexity testing. This test has not been FDA cleared or approved.  A negative test does not rule out the presence of PCR inhibitors in the specimen or target RNA in concentration below the limit of detection for the assay. The possibility of a false negative should be considered if the patient's recent exposure or clinical presentation suggests COVID-19.  [facility] are certified under the Clinical Laboratory Improvement Amendments of 1988 (CLIA-88) as qualified to perform moderate and/or high complexity laboratory testing.   Basic metabolic panel     Status: Abnormal    Collection Time: 10/25/21  8:11 PM   Result Value Ref Range    Sodium 121 (L) 133 - 144 mmol/L    Potassium 3.1 (L) 3.4 - 5.3 mmol/L    Chloride 85 (L) 94 - 109 mmol/L    Carbon Dioxide (CO2) 24 20 - 32 mmol/L    Anion Gap 12 3 - 14 mmol/L    Urea Nitrogen 15 7 - 30 mg/dL    Creatinine 1.19 0.66 - 1.25 mg/dL    Calcium 7.9 (L) 8.5 - 10.1 mg/dL    Glucose 132 (H) 70 - 99 mg/dL    GFR Estimate 69 >60 mL/min/1.73m2   Extra Tube     Status: None    Collection Time: 10/25/21  8:11 PM    Narrative    The following orders were created for panel order Extra Tube.  Procedure                               Abnormality         Status                     ---------                               -----------         ------                     Extra Red Top Tube[[id number]]                               Final result               Extra Red Top Tube[[id number]]                               Final result                 Please view results for these tests on the individual orders.   Extra Red Top Tube     Status: None    Collection Time: 10/25/21  8:11 PM   Result Value Ref Range    Hold Specimen JIC    Extra  Red Top Tube     Status: None    Collection Time: 10/25/21  8:11 PM   Result Value Ref Range    Hold Specimen [facility]        ED MEDICATIONS:   Medications   cefTRIAXone (ROCEPHIN) 2 g vial to attach to  ml bag for ADULTS or NS 50 ml bag for PEDS (0 g Intravenous Stopped 10/25/21 1332)   HYDROmorphone (PF) (DILAUDID) injection 0.5 mg (0.5 mg Intravenous Given 10/25/21 2220)   potassium chloride 10 mEq in 100 mL sterile water intermittent infusion (premix) (0 mEq Intravenous Stopped 10/25/21 1440)   dextrose 5% and 0.45% NaCl + KCl 10 meq/L infusion ( Intravenous Rate/Dose Verify 10/26/21 0102)   ondansetron (ZOFRAN) injection 4 mg (4 mg Intravenous Given 10/25/21 1134)   iopamidol (ISOVUE-370) solution 100 mL (100 mLs Intravenous Given 10/25/21 1238)   sodium chloride 0.9 % bag 500mL for CT scan flush use (74 mLs Intravenous Given 10/25/21 1239)   gadobutrol (GADAVIST) injection 12 mL (12 mLs Intravenous Given 10/25/21 1456)   0.9% sodium chloride BOLUS (0 mLs Intravenous Stopped 10/25/21 1835)   0.9% sodium chloride BOLUS (0 mLs Intravenous Stopped 10/25/21 1440)         Impression:    ICD-10-CM    1. Alcoholic liver disease (H)  K70.9    2. Abnormal CT of the abdomen  R93.5     Showing modest ascites, portal venous hypertension, splenomegaly, and liver cirrhosis   3. Hyponatremia  E87.1    4. Jaundice  R17    5. Alcoholic hepatitis with ascites  K70.11        Plan:    Pending studies include 6am labs ordered. Awaiting bed options for transfer.      1:46 AM: Discussed with Dr Prado at [facility]. Accepts for admission.      MD Joradn Graevs, Jake Farmer MD  10/26/21 7719

## 2021-10-27 ENCOUNTER — TELEPHONE (OUTPATIENT)
Dept: FAMILY MEDICINE | Facility: CLINIC | Age: 56
End: 2021-10-27

## 2021-10-27 ENCOUNTER — TELEPHONE (OUTPATIENT)
Dept: EMERGENCY MEDICINE | Facility: CLINIC | Age: 56
End: 2021-10-27

## 2021-10-27 LAB
EBV DNA COPIES/ML, INSTRUMENT: 3805 COPIES/ML
EBV DNA SPEC NAA+PROBE-LOG#: 3.6 {LOG_COPIES}/ML

## 2021-10-28 LAB
BACTERIA BLD CULT: ABNORMAL
BACTERIA BLD CULT: ABNORMAL

## 2021-10-28 NOTE — ED TRIAGE NOTES
[facility] laboratory called to notify charge RN that patient's Hepatitis quantitative PCR test did not provide results due to not enough blood sample in tube. This RN called over to [facility] and spoke with JIN Lechuga who was the RN working with patient. This RN notified staff of unresulted lab test.

## 2021-10-30 LAB — BACTERIA BLD CULT: NO GROWTH

## 2021-11-29 ENCOUNTER — NURSE TRIAGE (OUTPATIENT)
Dept: NURSING | Facility: CLINIC | Age: 56
End: 2021-11-29
Payer: COMMERCIAL

## 2021-11-29 DIAGNOSIS — Z11.59 ENCOUNTER FOR SCREENING FOR OTHER VIRAL DISEASES: ICD-10-CM

## 2021-11-29 NOTE — TELEPHONE ENCOUNTER
"TRIAGE CALL   Wife Maryan calling CTC on file  Pt next to her does not want to take with anyone.-  She reports pt is having difficulty with breathing she is concern does not if she need to take him to the ER.    He was Discharged on NOV 12 from the hospital - he was at [facility] due to \"Liver issues\"  His next paracentesis is this coming Friday   BP: 80/60  Orally TEMP: 99.9   Only 2 urinations today   His \"belly\"  is the biggest he has had it since he came back from the hospital  His IV line was removed last Tuesday  She hears him moaning, but he denies pain  She hears him breathing with difficulty and he is sitting in his recliner.  No swollen feet they have been elevated.  They have made an appt for  Rafa Mccoy MD next week   Medications taken today - No medication on our epic.  His prescription gotten at the hospital      Patient denies , No chest pain     Per protocol, disposition to call 911   Reminded we will be here 24/7 and can call back if they need to sepak with a Nurse.   Wife will talk to  about it verbalized understanding and agrees with plan    Kathryn Art RN Nurse Triage Advisor 5:13 PM 11/29/2021  Reason for Disposition    Sounds like a life-threatening emergency to the triager    Additional Information    Negative: [1] Breathing stopped AND [2] hasn't returned    Negative: Choking on something    Negative: Severe difficulty breathing (e.g., struggling for each breath, speaks in single words)    Negative: Bluish (or gray) lips or face now    Negative: Difficult to awaken or acting confused (e.g., disoriented, slurred speech)    Negative: Passed out (i.e., lost consciousness, collapsed and was not responding)    Negative: Wheezing started suddenly after medicine, an allergic food or bee sting    Negative: Stridor    Negative: Slow, shallow and weak breathing    Protocols used: BREATHING DIFFICULTY-A-AH      "

## 2021-11-30 RX ORDER — ALBUMIN (HUMAN) 12.5 G/50ML
12.5 SOLUTION INTRAVENOUS ONCE
Status: CANCELLED | OUTPATIENT
Start: 2021-11-30 | End: 2021-11-30

## 2021-12-01 ENCOUNTER — LAB (OUTPATIENT)
Dept: LAB | Facility: CLINIC | Age: 56
End: 2021-12-01
Payer: COMMERCIAL

## 2021-12-01 DIAGNOSIS — Z11.59 ENCOUNTER FOR SCREENING FOR OTHER VIRAL DISEASES: ICD-10-CM

## 2021-12-01 PROCEDURE — U0003 INFECTIOUS AGENT DETECTION BY NUCLEIC ACID (DNA OR RNA); SEVERE ACUTE RESPIRATORY SYNDROME CORONAVIRUS 2 (SARS-COV-2) (CORONAVIRUS DISEASE [COVID-19]), AMPLIFIED PROBE TECHNIQUE, MAKING USE OF HIGH THROUGHPUT TECHNOLOGIES AS DESCRIBED BY CMS-2020-01-R: HCPCS

## 2021-12-01 PROCEDURE — U0005 INFEC AGEN DETEC AMPLI PROBE: HCPCS

## 2021-12-02 LAB — SARS-COV-2 RNA RESP QL NAA+PROBE: NEGATIVE

## 2021-12-03 ENCOUNTER — HOSPITAL ENCOUNTER (OUTPATIENT)
Dept: ULTRASOUND IMAGING | Facility: CLINIC | Age: 56
Discharge: HOME | End: 2021-12-03
Attending: PHYSICIAN ASSISTANT | Admitting: PHYSICIAN ASSISTANT
Payer: COMMERCIAL

## 2021-12-03 VITALS
HEART RATE: 78 BPM | TEMPERATURE: 98.3 F | SYSTOLIC BLOOD PRESSURE: 109 MMHG | DIASTOLIC BLOOD PRESSURE: 69 MMHG | RESPIRATION RATE: 16 BRPM

## 2021-12-03 DIAGNOSIS — R03.0 ELEVATED BLOOD PRESSURE READING WITHOUT DIAGNOSIS OF HYPERTENSION: ICD-10-CM

## 2021-12-03 DIAGNOSIS — K70.31 ALCOHOLIC CIRRHOSIS OF LIVER WITH ASCITES (H): ICD-10-CM

## 2021-12-03 DIAGNOSIS — K70.11 ALCOHOLIC HEPATITIS WITH ASCITES (H): ICD-10-CM

## 2021-12-03 DIAGNOSIS — I85.10 SECONDARY ESOPHAGEAL VARICES WITHOUT BLEEDING (H): ICD-10-CM

## 2021-12-03 PROCEDURE — 250N000011 HC RX IP 250 OP 636: Performed by: PHYSICIAN ASSISTANT

## 2021-12-03 PROCEDURE — P9047 ALBUMIN (HUMAN), 25%, 50ML: HCPCS | Performed by: PHYSICIAN ASSISTANT

## 2021-12-03 PROCEDURE — 272N000706 US PARACENTESIS

## 2021-12-03 PROCEDURE — 250N000009 HC RX 250: Performed by: RADIOLOGY

## 2021-12-03 RX ORDER — ALBUMIN (HUMAN) 12.5 G/50ML
50 SOLUTION INTRAVENOUS ONCE
Status: COMPLETED | OUTPATIENT
Start: 2021-12-03 | End: 2021-12-03

## 2021-12-03 RX ADMIN — LIDOCAINE HYDROCHLORIDE 8 ML: 10 INJECTION, SOLUTION EPIDURAL; INFILTRATION; INTRACAUDAL; PERINEURAL at 13:49

## 2021-12-03 RX ADMIN — ALBUMIN HUMAN 50 G: 0.25 SOLUTION INTRAVENOUS at 14:56

## 2021-12-03 NOTE — PROGRESS NOTES
LLQ paracentesis performed by radiologist.  7200 ml straw-coloerd fluid removed. Pressure held at site after catheter removed. No bleeding noted. Given 50 g Albumin per Annemarie Megan de Pinto order.

## 2021-12-03 NOTE — DISCHARGE INSTRUCTIONS
Radiology  Discharge Instructions for Abdominal Paracentesis    You have had an abdominal paracentesis procedure today.  A needle or catheter was put into your abdomen to remove fluid for laboratory studies and/or to remove the extra fluid from your abdomen.    AFTER YOU ARE HOME:    Rest at home today.    Limit physical activity such as lifting, straining, or exercise for 48 hours.  You may resume normal activity in 24 hours.    Resume previous diet and medications.    You may remove bandage in 24 hours.    CALL YOUR PRIMARY PROVIDER IF:    You develop temperature over 101o F, or redness at the drainage site.    You have any other questions or concerns.    AFTER HOURS CALL [facility] NURSE ADVISORS AT [phone]    COME TO EMERGENCY ROOM IF:    You develop severe abdominal pain, swelling the size of a baseball or larger, continuous oozing from puncture site longer than 24 hours, bleeding that saturates a gauze dressing even after you have put direct pressure on the site for 15 minutes, severe lightheadedness or fainting.  DO NOT DRIVE YOURSELF.    [facility] Imaging Scheduler:   [phone]

## 2021-12-13 RX ORDER — ALBUMIN (HUMAN) 12.5 G/50ML
12.5 SOLUTION INTRAVENOUS ONCE
Status: CANCELLED | OUTPATIENT
Start: 2021-12-13 | End: 2021-12-13

## 2021-12-16 ENCOUNTER — TRANSCRIBE ORDERS (OUTPATIENT)
Dept: GASTROENTEROLOGY | Facility: CLINIC | Age: 56
End: 2021-12-16
Payer: COMMERCIAL

## 2021-12-16 ENCOUNTER — HOSPITAL ENCOUNTER (OUTPATIENT)
Dept: ULTRASOUND IMAGING | Facility: CLINIC | Age: 56
End: 2021-12-16
Attending: STUDENT IN AN ORGANIZED HEALTH CARE EDUCATION/TRAINING PROGRAM
Payer: COMMERCIAL

## 2021-12-16 ENCOUNTER — TELEPHONE (OUTPATIENT)
Dept: GENERAL RADIOLOGY | Facility: CLINIC | Age: 56
End: 2021-12-16

## 2021-12-16 ENCOUNTER — HOSPITAL ENCOUNTER (OUTPATIENT)
Dept: GENERAL RADIOLOGY | Facility: CLINIC | Age: 56
End: 2021-12-16
Attending: INTERNAL MEDICINE
Payer: COMMERCIAL

## 2021-12-16 VITALS
TEMPERATURE: 99.4 F | RESPIRATION RATE: 16 BRPM | HEART RATE: 85 BPM | SYSTOLIC BLOOD PRESSURE: 104 MMHG | DIASTOLIC BLOOD PRESSURE: 58 MMHG

## 2021-12-16 DIAGNOSIS — K76.82 HEPATIC ENCEPHALOPATHY (H): ICD-10-CM

## 2021-12-16 DIAGNOSIS — R05.9 COUGH: ICD-10-CM

## 2021-12-16 DIAGNOSIS — R60.0 LOWER EXTREMITY EDEMA: ICD-10-CM

## 2021-12-16 DIAGNOSIS — K70.31 ALCOHOLIC CIRRHOSIS OF LIVER WITH ASCITES (H): ICD-10-CM

## 2021-12-16 DIAGNOSIS — D64.9 ANEMIA, UNSPECIFIED TYPE: ICD-10-CM

## 2021-12-16 DIAGNOSIS — R18.8 OTHER ASCITES: ICD-10-CM

## 2021-12-16 DIAGNOSIS — K74.60 CIRRHOSIS OF LIVER WITH ASCITES, UNSPECIFIED HEPATIC CIRRHOSIS TYPE (H): ICD-10-CM

## 2021-12-16 DIAGNOSIS — R50.9 FEVER, UNSPECIFIED FEVER CAUSE: ICD-10-CM

## 2021-12-16 DIAGNOSIS — R18.8 CIRRHOSIS OF LIVER WITH ASCITES, UNSPECIFIED HEPATIC CIRRHOSIS TYPE (H): ICD-10-CM

## 2021-12-16 PROCEDURE — 71046 X-RAY EXAM CHEST 2 VIEWS: CPT

## 2021-12-16 PROCEDURE — 272N000706 US PARACENTESIS

## 2021-12-16 PROCEDURE — P9047 ALBUMIN (HUMAN), 25%, 50ML: HCPCS | Performed by: RADIOLOGY

## 2021-12-16 PROCEDURE — 250N000011 HC RX IP 250 OP 636: Performed by: RADIOLOGY

## 2021-12-16 PROCEDURE — 250N000009 HC RX 250: Performed by: RADIOLOGY

## 2021-12-16 RX ORDER — ALBUMIN (HUMAN) 12.5 G/50ML
12.5 SOLUTION INTRAVENOUS ONCE
Status: CANCELLED | OUTPATIENT
Start: 2021-12-16 | End: 2021-12-16

## 2021-12-16 RX ORDER — ALBUMIN (HUMAN) 12.5 G/50ML
25 SOLUTION INTRAVENOUS ONCE
Status: COMPLETED | OUTPATIENT
Start: 2021-12-16 | End: 2021-12-16

## 2021-12-16 RX ADMIN — LIDOCAINE HYDROCHLORIDE 8 ML: 10 INJECTION, SOLUTION EPIDURAL; INFILTRATION; INTRACAUDAL; PERINEURAL at 12:16

## 2021-12-16 RX ADMIN — ALBUMIN HUMAN 25 G: 0.25 SOLUTION INTRAVENOUS at 13:13

## 2021-12-16 NOTE — PROGRESS NOTES
RLQ paracentesis performed by radiologist.   4100 ml straw-colored fluid removed. Pressure held at site after catheter removed. No bleeding noted. Given Albumin 25 g per Dr Bravo order.

## 2021-12-16 NOTE — DISCHARGE INSTRUCTIONS
Radiology  Discharge Instructions for Abdominal Paracentesis    You have had an abdominal paracentesis procedure today.  A needle or catheter was put into your abdomen to remove fluid for laboratory studies and/or to remove the extra fluid from your abdomen.    AFTER YOU ARE HOME:    Rest at home today.    Limit physical activity such as lifting, straining, or exercise for 48 hours.  You may resume normal activity in 24 hours.    Resume previous diet and medications.    You may remove bandage in 24 hours.    CALL YOUR PRIMARY PROVIDER IF:    You develop temperature over 101o F, or redness at the drainage site.    You have any other questions or concerns.    AFTER HOURS CALL [facility] NURSE ADVISORS AT [phone]    COME TO EMERGENCY ROOM IF:    You develop severe abdominal pain, swelling the size of a baseball or larger, continuous oozing from puncture site longer than 24 hours, bleeding that saturates a gauze dressing even after you have put direct pressure on the site for 15 minutes, severe lightheadedness or fainting.  DO NOT DRIVE YOURSELF.

## 2021-12-16 NOTE — TELEPHONE ENCOUNTER
Patient contacted about next appointment scheduled.  After paracentesis today and volume of fluid drained 4100 ml, patient should plan on coming in 2 weeks, doesn't need to come in 1 week.  Patient does have appointment next week.  Wife and patient want to keep appointment, in case patient feels uncomfortable with fluid.  Patient reports feeling as uncomfortable today when 4100 ml was drained as he did on 12/3/2021 when he had 7200 ml drained.  Encouraged patient or wife to cancel appointment next week if he is feeling ok.

## 2021-12-16 NOTE — PROGRESS NOTES
RADIOLOGY PROCEDURE NOTE  Patient name: Oscar Greenberg  MRN: [id number]  : 1965    Pre-procedure diagnosis: Distention.  Post-procedure diagnosis: Same    Procedure Date/Time: 2021  1:01 PM  Procedure: US guided paracentesis.  Estimated blood loss: None  Specimen(s) collected with description: 4.1 L straw colored fluid.  The patient tolerated the procedure well with no immediate complications.    See imaging dictation for procedural details.    Provider name: Marcus Yu MD  Assistant(s):None

## 2021-12-22 ENCOUNTER — HOSPITAL ENCOUNTER (OUTPATIENT)
Dept: ULTRASOUND IMAGING | Facility: CLINIC | Age: 56
Discharge: HOME | End: 2021-12-22
Attending: STUDENT IN AN ORGANIZED HEALTH CARE EDUCATION/TRAINING PROGRAM | Admitting: STUDENT IN AN ORGANIZED HEALTH CARE EDUCATION/TRAINING PROGRAM
Payer: COMMERCIAL

## 2021-12-22 VITALS
SYSTOLIC BLOOD PRESSURE: 112 MMHG | TEMPERATURE: 97.8 F | HEART RATE: 99 BPM | RESPIRATION RATE: 16 BRPM | DIASTOLIC BLOOD PRESSURE: 72 MMHG

## 2021-12-22 DIAGNOSIS — K70.31 ALCOHOLIC CIRRHOSIS OF LIVER WITH ASCITES (H): ICD-10-CM

## 2021-12-22 PROCEDURE — P9047 ALBUMIN (HUMAN), 25%, 50ML: HCPCS | Performed by: RADIOLOGY

## 2021-12-22 PROCEDURE — 250N000011 HC RX IP 250 OP 636: Performed by: RADIOLOGY

## 2021-12-22 PROCEDURE — 49083 ABD PARACENTESIS W/IMAGING: CPT

## 2021-12-22 PROCEDURE — 250N000009 HC RX 250: Performed by: RADIOLOGY

## 2021-12-22 RX ORDER — ALBUMIN (HUMAN) 12.5 G/50ML
50 SOLUTION INTRAVENOUS ONCE
Status: COMPLETED | OUTPATIENT
Start: 2021-12-22 | End: 2021-12-22

## 2021-12-22 RX ORDER — ALBUMIN (HUMAN) 12.5 G/50ML
12.5 SOLUTION INTRAVENOUS ONCE
Status: CANCELLED | OUTPATIENT
Start: 2021-12-22 | End: 2021-12-22

## 2021-12-22 RX ORDER — LIDOCAINE 40 MG/G
CREAM TOPICAL
Status: DISCONTINUED | OUTPATIENT
Start: 2021-12-22 | End: 2021-12-23 | Stop reason: HOSPADM

## 2021-12-22 RX ADMIN — LIDOCAINE HYDROCHLORIDE 5 ML: 10 INJECTION, SOLUTION EPIDURAL; INFILTRATION; INTRACAUDAL; PERINEURAL at 12:46

## 2021-12-22 RX ADMIN — ALBUMIN HUMAN 50 G: 0.25 SOLUTION INTRAVENOUS at 14:22

## 2021-12-22 NOTE — DISCHARGE INSTRUCTIONS
Radiology  Discharge Instructions for Abdominal Paracentesis    You have had an abdominal paracentesis procedure today.  A needle or catheter was put into your abdomen to remove fluid for laboratory studies and/or to remove the extra fluid from your abdomen.    AFTER YOU ARE HOME:    Rest at home today.    Limit physical activity such as lifting, straining, or exercise for 48 hours.  You may resume normal activity in 24 hours.    Resume previous diet and medications.    You may remove bandage in 24 hours.    CALL YOUR PRIMARY PROVIDER IF:    You develop temperature over 101o F, or redness at the drainage site.    You have any other questions or concerns.    AFTER HOURS CALL [facility] NURSE ADVISORS AT [phone]    COME TO EMERGENCY ROOM IF:    You develop severe abdominal pain, swelling the size of a baseball or larger, continuous oozing from puncture site longer than 24 hours, bleeding that saturates a gauze dressing even after you have put direct pressure on the site for 15 minutes, severe lightheadedness or fainting.  DO NOT DRIVE YOURSELF.

## 2021-12-22 NOTE — PROGRESS NOTES
LLQ paracentesis performed by radiologist.   6900 ml straw-colored fluid removed. Pressure held at site after catheter removed. No bleeding noted. Given Albumin per Dr Bravo order.

## 2021-12-29 ENCOUNTER — LAB (OUTPATIENT)
Dept: LAB | Facility: CLINIC | Age: 56
End: 2021-12-29
Payer: COMMERCIAL

## 2021-12-29 DIAGNOSIS — K70.31 ALCOHOLIC CIRRHOSIS OF LIVER WITH ASCITES (H): Primary | ICD-10-CM

## 2021-12-29 DIAGNOSIS — K70.31 ALCOHOLIC CIRRHOSIS OF LIVER WITH ASCITES (H): ICD-10-CM

## 2021-12-29 LAB
AFP SERPL-MCNC: 2.2 UG/L (ref 0–8)
ALBUMIN SERPL-MCNC: 1.6 G/DL (ref 3.4–5)
ALP SERPL-CCNC: 244 U/L (ref 40–150)
ALT SERPL W P-5'-P-CCNC: 34 U/L (ref 0–70)
ANION GAP SERPL CALCULATED.3IONS-SCNC: 13 MMOL/L (ref 3–14)
APTT PPP: 49 SECONDS (ref 22–38)
AST SERPL W P-5'-P-CCNC: 107 U/L (ref 0–45)
BILIRUB SERPL-MCNC: 8.4 MG/DL (ref 0.2–1.3)
BUN SERPL-MCNC: 17 MG/DL (ref 7–30)
CALCIUM SERPL-MCNC: 8.1 MG/DL (ref 8.5–10.1)
CHLORIDE BLD-SCNC: 90 MMOL/L (ref 94–109)
CO2 SERPL-SCNC: 19 MMOL/L (ref 20–32)
CREAT SERPL-MCNC: 1.14 MG/DL (ref 0.66–1.25)
GFR SERPL CREATININE-BSD FRML MDRD: 76 ML/MIN/1.73M2
GLUCOSE BLD-MCNC: 146 MG/DL (ref 70–99)
INR PPP: 1.64 (ref 0.85–1.15)
POTASSIUM BLD-SCNC: 4.2 MMOL/L (ref 3.4–5.3)
PROT SERPL-MCNC: 7.5 G/DL (ref 6.8–8.8)
SODIUM SERPL-SCNC: 122 MMOL/L (ref 133–144)

## 2021-12-29 PROCEDURE — 36415 COLL VENOUS BLD VENIPUNCTURE: CPT

## 2021-12-29 PROCEDURE — 82105 ALPHA-FETOPROTEIN SERUM: CPT

## 2021-12-29 PROCEDURE — 80053 COMPREHEN METABOLIC PANEL: CPT

## 2021-12-29 PROCEDURE — 85730 THROMBOPLASTIN TIME PARTIAL: CPT

## 2021-12-29 PROCEDURE — 85610 PROTHROMBIN TIME: CPT

## 2021-12-30 ENCOUNTER — HOSPITAL ENCOUNTER (OUTPATIENT)
Dept: ULTRASOUND IMAGING | Facility: CLINIC | Age: 56
Discharge: HOME | End: 2021-12-30
Attending: STUDENT IN AN ORGANIZED HEALTH CARE EDUCATION/TRAINING PROGRAM | Admitting: STUDENT IN AN ORGANIZED HEALTH CARE EDUCATION/TRAINING PROGRAM
Payer: COMMERCIAL

## 2021-12-30 VITALS
TEMPERATURE: 97.8 F | SYSTOLIC BLOOD PRESSURE: 104 MMHG | DIASTOLIC BLOOD PRESSURE: 60 MMHG | OXYGEN SATURATION: 97 % | RESPIRATION RATE: 15 BRPM | HEART RATE: 100 BPM

## 2021-12-30 DIAGNOSIS — K70.31 ALCOHOLIC CIRRHOSIS OF LIVER WITH ASCITES (H): ICD-10-CM

## 2021-12-30 PROCEDURE — 250N000009 HC RX 250: Performed by: RADIOLOGY

## 2021-12-30 PROCEDURE — 250N000011 HC RX IP 250 OP 636: Performed by: MARRIAGE & FAMILY THERAPIST

## 2021-12-30 PROCEDURE — 272N000047 US PARACENTESIS

## 2021-12-30 PROCEDURE — P9047 ALBUMIN (HUMAN), 25%, 50ML: HCPCS | Performed by: MARRIAGE & FAMILY THERAPIST

## 2021-12-30 RX ORDER — MIDODRINE HYDROCHLORIDE 10 MG/1
1 TABLET ORAL EVERY 8 HOURS
COMMUNITY
Start: 2021-11-12 | End: 2023-10-31

## 2021-12-30 RX ORDER — MULTIPLE VITAMINS W/ MINERALS TAB 9MG-400MCG
1 TAB ORAL EVERY 24 HOURS
COMMUNITY
Start: 2021-12-28

## 2021-12-30 RX ORDER — FUROSEMIDE 20 MG
40 TABLET ORAL EVERY 24 HOURS
COMMUNITY
Start: 2021-12-16

## 2021-12-30 RX ORDER — ALBUMIN (HUMAN) 12.5 G/50ML
12.5 SOLUTION INTRAVENOUS ONCE
Status: DISCONTINUED | OUTPATIENT
Start: 2021-12-30 | End: 2021-12-30 | Stop reason: DRUGHIGH

## 2021-12-30 RX ORDER — SPIRONOLACTONE 50 MG/1
100 TABLET, FILM COATED ORAL EVERY 24 HOURS
COMMUNITY
Start: 2021-12-16

## 2021-12-30 RX ORDER — ALBUMIN (HUMAN) 12.5 G/50ML
50 SOLUTION INTRAVENOUS ONCE
Status: COMPLETED | OUTPATIENT
Start: 2021-12-30 | End: 2021-12-30

## 2021-12-30 RX ORDER — LACTULOSE 10 G/15ML
30 SOLUTION ORAL 3 TIMES DAILY
COMMUNITY
Start: 2021-11-12

## 2021-12-30 RX ADMIN — LIDOCAINE HYDROCHLORIDE 8 ML: 10 INJECTION, SOLUTION EPIDURAL; INFILTRATION; INTRACAUDAL; PERINEURAL at 12:50

## 2021-12-30 RX ADMIN — ALBUMIN HUMAN 50 G: 0.25 SOLUTION INTRAVENOUS at 13:35

## 2021-12-30 NOTE — DISCHARGE INSTRUCTIONS
.para                        Radiology  Discharge Instructions for Abdominal Paracentesis    You have had an abdominal paracentesis procedure today.  A needle or catheter was put into your abdomen to remove fluid for laboratory studies and/or to remove the extra fluid from your abdomen.    AFTER YOU ARE HOME:    Rest at home today.    Limit physical activity such as lifting, straining, or exercise for 48 hours.  You may resume normal activity in 24 hours.    Resume previous diet and medications.    You may remove bandage in 24 hours.    CALL YOUR PRIMARY PROVIDER IF:    You develop temperature over 101o F, or redness at the drainage site.    You have any other questions or concerns.    AFTER HOURS CALL [facility] NURSE ADVISORS AT [phone]    COME TO EMERGENCY ROOM IF:    You develop severe abdominal pain, swelling the size of a baseball or larger, continuous oozing from puncture site longer than 24 hours, bleeding that saturates a gauze dressing even after you have put direct pressure on the site for 15 minutes, severe lightheadedness or fainting.  DO NOT DRIVE YOURSELF.

## 2021-12-30 NOTE — PROGRESS NOTES
RLQ paracentesis performed by radiologist.     6100 Ml clear yellow fluid removed. Pressure held at site after catheter removed. No bleeding noted. Given Albumin per protocol.

## 2021-12-30 NOTE — PROGRESS NOTES
RADIOLOGY PROCEDURE NOTE  Patient name: Oscar Greenberg  MRN: [id number]  : 1965    Pre-procedure diagnosis: Distention.  Post-procedure diagnosis: Same    Procedure Date/Time: 2021  1:37 PM  Procedure: US guided paracentesis.  Estimated blood loss: None  Specimen(s) collected with description: 6.1 L straw colored fluid.  The patient tolerated the procedure well with no immediate complications.    See imaging dictation for procedural details.    Provider name: Marcus Yu MD  Assistant(s):None

## 2022-01-06 ENCOUNTER — TRANSCRIBE ORDERS (OUTPATIENT)
Dept: OTHER | Age: 57
End: 2022-01-06
Payer: COMMERCIAL

## 2022-01-06 DIAGNOSIS — K70.31 ALCOHOLIC CIRRHOSIS OF LIVER WITH ASCITES (H): Primary | ICD-10-CM

## 2022-01-06 DIAGNOSIS — R60.0 LOWER EXTREMITY EDEMA: ICD-10-CM

## 2022-01-06 DIAGNOSIS — K74.60 HEPATIC CIRRHOSIS, UNSPECIFIED HEPATIC CIRRHOSIS TYPE (H): ICD-10-CM

## 2022-01-09 ENCOUNTER — HEALTH MAINTENANCE LETTER (OUTPATIENT)
Age: 57
End: 2022-01-09

## 2022-01-12 ENCOUNTER — DOCUMENTATION ONLY (OUTPATIENT)
Dept: LAB | Facility: CLINIC | Age: 57
End: 2022-01-12
Payer: COMMERCIAL

## 2022-01-12 DIAGNOSIS — K70.30 ALCOHOLIC CIRRHOSIS OF LIVER (H): Primary | ICD-10-CM

## 2022-01-12 NOTE — PROGRESS NOTES
This patient has a lab only appointment tomorrow and does not have orders. Please review and place future orders. Thank you.

## 2022-01-13 ENCOUNTER — LAB (OUTPATIENT)
Dept: LAB | Facility: CLINIC | Age: 57
End: 2022-01-13
Payer: COMMERCIAL

## 2022-01-13 DIAGNOSIS — K70.30 ALCOHOLIC CIRRHOSIS OF LIVER (H): Primary | ICD-10-CM

## 2022-01-13 DIAGNOSIS — K70.30 ALCOHOLIC CIRRHOSIS OF LIVER (H): ICD-10-CM

## 2022-01-13 LAB
ANION GAP SERPL CALCULATED.3IONS-SCNC: 11 MMOL/L (ref 3–14)
BASOPHILS # BLD AUTO: 0.1 10E3/UL (ref 0–0.2)
BASOPHILS NFR BLD AUTO: 1 %
BILIRUB SERPL-MCNC: 6.1 MG/DL (ref 0.2–1.3)
BUN SERPL-MCNC: 14 MG/DL (ref 7–30)
CALCIUM SERPL-MCNC: 8.2 MG/DL (ref 8.5–10.1)
CHLORIDE BLD-SCNC: 96 MMOL/L (ref 94–109)
CO2 SERPL-SCNC: 21 MMOL/L (ref 20–32)
CREAT SERPL-MCNC: 1 MG/DL (ref 0.66–1.25)
EOSINOPHIL # BLD AUTO: 0.1 10E3/UL (ref 0–0.7)
EOSINOPHIL NFR BLD AUTO: 1 %
ERYTHROCYTE [DISTWIDTH] IN BLOOD BY AUTOMATED COUNT: 15.3 % (ref 10–15)
GFR SERPL CREATININE-BSD FRML MDRD: 89 ML/MIN/1.73M2
GLUCOSE BLD-MCNC: 125 MG/DL (ref 70–99)
HCT VFR BLD AUTO: 28.8 % (ref 40–53)
HGB BLD-MCNC: 9.2 G/DL (ref 13.3–17.7)
IMM GRANULOCYTES # BLD: 0.1 10E3/UL
IMM GRANULOCYTES NFR BLD: 2 %
INR PPP: 1.57 (ref 0.85–1.15)
LYMPHOCYTES # BLD AUTO: 1.6 10E3/UL (ref 0.8–5.3)
LYMPHOCYTES NFR BLD AUTO: 27 %
MCH RBC QN AUTO: 33.1 PG (ref 26.5–33)
MCHC RBC AUTO-ENTMCNC: 31.9 G/DL (ref 31.5–36.5)
MCV RBC AUTO: 104 FL (ref 78–100)
MONOCYTES # BLD AUTO: 0.7 10E3/UL (ref 0–1.3)
MONOCYTES NFR BLD AUTO: 12 %
NEUTROPHILS # BLD AUTO: 3.4 10E3/UL (ref 1.6–8.3)
NEUTROPHILS NFR BLD AUTO: 57 %
NRBC # BLD AUTO: 0 10E3/UL
NRBC BLD AUTO-RTO: 0 /100
PLATELET # BLD AUTO: 153 10E3/UL (ref 150–450)
POTASSIUM BLD-SCNC: 4.2 MMOL/L (ref 3.4–5.3)
RBC # BLD AUTO: 2.78 10E6/UL (ref 4.4–5.9)
SODIUM SERPL-SCNC: 128 MMOL/L (ref 133–144)
WBC # BLD AUTO: 6 10E3/UL (ref 4–11)

## 2022-01-13 PROCEDURE — 85025 COMPLETE CBC W/AUTO DIFF WBC: CPT

## 2022-01-13 PROCEDURE — 84166 PROTEIN E-PHORESIS/URINE/CSF: CPT | Performed by: PATHOLOGY

## 2022-01-13 PROCEDURE — 85610 PROTHROMBIN TIME: CPT

## 2022-01-13 PROCEDURE — 80048 BASIC METABOLIC PNL TOTAL CA: CPT

## 2022-01-13 PROCEDURE — 36415 COLL VENOUS BLD VENIPUNCTURE: CPT

## 2022-01-13 PROCEDURE — 82247 BILIRUBIN TOTAL: CPT

## 2022-01-14 ENCOUNTER — HOSPITAL ENCOUNTER (OUTPATIENT)
Dept: ULTRASOUND IMAGING | Facility: CLINIC | Age: 57
Discharge: HOME | End: 2022-01-14
Attending: INTERNAL MEDICINE | Admitting: INTERNAL MEDICINE
Payer: COMMERCIAL

## 2022-01-14 VITALS
DIASTOLIC BLOOD PRESSURE: 60 MMHG | TEMPERATURE: 98.3 F | HEART RATE: 90 BPM | RESPIRATION RATE: 16 BRPM | SYSTOLIC BLOOD PRESSURE: 100 MMHG

## 2022-01-14 DIAGNOSIS — R50.9 FEVER, UNSPECIFIED FEVER CAUSE: ICD-10-CM

## 2022-01-14 DIAGNOSIS — D64.9 ANEMIA, UNSPECIFIED TYPE: ICD-10-CM

## 2022-01-14 DIAGNOSIS — K74.60 CIRRHOSIS OF LIVER WITH ASCITES, UNSPECIFIED HEPATIC CIRRHOSIS TYPE (H): ICD-10-CM

## 2022-01-14 DIAGNOSIS — R60.0 LOWER EXTREMITY EDEMA: ICD-10-CM

## 2022-01-14 DIAGNOSIS — K76.82 HEPATIC ENCEPHALOPATHY (H): ICD-10-CM

## 2022-01-14 DIAGNOSIS — R05.9 COUGH: ICD-10-CM

## 2022-01-14 DIAGNOSIS — K70.31 ALCOHOLIC CIRRHOSIS OF LIVER WITH ASCITES (H): ICD-10-CM

## 2022-01-14 DIAGNOSIS — R18.8 OTHER ASCITES: ICD-10-CM

## 2022-01-14 DIAGNOSIS — R18.8 CIRRHOSIS OF LIVER WITH ASCITES, UNSPECIFIED HEPATIC CIRRHOSIS TYPE (H): ICD-10-CM

## 2022-01-14 PROCEDURE — P9047 ALBUMIN (HUMAN), 25%, 50ML: HCPCS | Performed by: RADIOLOGY

## 2022-01-14 PROCEDURE — 272N000047 US PARACENTESIS

## 2022-01-14 PROCEDURE — 250N000011 HC RX IP 250 OP 636: Performed by: RADIOLOGY

## 2022-01-14 PROCEDURE — 250N000009 HC RX 250: Performed by: RADIOLOGY

## 2022-01-14 RX ORDER — ALBUMIN (HUMAN) 12.5 G/50ML
25 SOLUTION INTRAVENOUS ONCE
Status: COMPLETED | OUTPATIENT
Start: 2022-01-14 | End: 2022-01-14

## 2022-01-14 RX ORDER — ALBUMIN (HUMAN) 12.5 G/50ML
12.5 SOLUTION INTRAVENOUS ONCE
Status: CANCELLED | OUTPATIENT
Start: 2022-01-14 | End: 2022-01-14

## 2022-01-14 RX ORDER — LIDOCAINE 40 MG/G
CREAM TOPICAL
Status: DISCONTINUED | OUTPATIENT
Start: 2022-01-14 | End: 2022-01-15 | Stop reason: HOSPADM

## 2022-01-14 RX ADMIN — LIDOCAINE HYDROCHLORIDE 6 ML: 10 INJECTION, SOLUTION EPIDURAL; INFILTRATION; INTRACAUDAL; PERINEURAL at 12:36

## 2022-01-14 RX ADMIN — ALBUMIN HUMAN 25 G: 0.25 SOLUTION INTRAVENOUS at 13:27

## 2022-01-14 NOTE — PROGRESS NOTES
RLQ paracentesis performed by radiologist.   5900 ml boone fluid removed. Pressure held at site after catheter removed. No bleeding noted. Given Albumin 25 g per Dr Bravo order.

## 2022-01-14 NOTE — DISCHARGE INSTRUCTIONS
Radiology  Discharge Instructions for Abdominal Paracentesis    You have had an abdominal paracentesis procedure today.  A needle or catheter was put into your abdomen to remove fluid for laboratory studies and/or to remove the extra fluid from your abdomen.    AFTER YOU ARE HOME:    Rest at home today.    Limit physical activity such as lifting, straining, or exercise for 48 hours.  You may resume normal activity in 24 hours.    Resume previous diet and medications.    You may remove bandage in 24 hours.    CALL YOUR PRIMARY PROVIDER IF:    You develop temperature over 101o F, or redness at the drainage site.    You have any other questions or concerns.    AFTER HOURS CALL [facility] NURSE ADVISORS AT [phone]    COME TO EMERGENCY ROOM IF:    You develop severe abdominal pain, swelling the size of a baseball or larger, continuous oozing from puncture site longer than 24 hours, bleeding that saturates a gauze dressing even after you have put direct pressure on the site for 15 minutes, severe lightheadedness or fainting.  DO NOT DRIVE YOURSELF.

## 2022-01-17 LAB — PROT PATTERN UR ELPH-IMP: NORMAL

## 2022-01-21 ENCOUNTER — HOSPITAL ENCOUNTER (OUTPATIENT)
Dept: ULTRASOUND IMAGING | Facility: CLINIC | Age: 57
Discharge: HOME | End: 2022-01-21
Attending: PHYSICIAN ASSISTANT | Admitting: PHYSICIAN ASSISTANT
Payer: COMMERCIAL

## 2022-01-21 VITALS — SYSTOLIC BLOOD PRESSURE: 102 MMHG | HEART RATE: 91 BPM | DIASTOLIC BLOOD PRESSURE: 69 MMHG | RESPIRATION RATE: 18 BRPM

## 2022-01-21 DIAGNOSIS — K70.31 ALCOHOLIC CIRRHOSIS OF LIVER WITH ASCITES (H): ICD-10-CM

## 2022-01-21 DIAGNOSIS — K70.30 ALCOHOLIC CIRRHOSIS OF LIVER (H): ICD-10-CM

## 2022-01-21 LAB
ANION GAP SERPL CALCULATED.3IONS-SCNC: 9 MMOL/L (ref 3–14)
BASOPHILS # BLD AUTO: 0.1 10E3/UL (ref 0–0.2)
BASOPHILS NFR BLD AUTO: 1 %
BILIRUB SERPL-MCNC: 5.7 MG/DL (ref 0.2–1.3)
BUN SERPL-MCNC: 14 MG/DL (ref 7–30)
CALCIUM SERPL-MCNC: 8.5 MG/DL (ref 8.5–10.1)
CHLORIDE BLD-SCNC: 98 MMOL/L (ref 94–109)
CO2 SERPL-SCNC: 23 MMOL/L (ref 20–32)
CREAT SERPL-MCNC: 1.04 MG/DL (ref 0.66–1.25)
EOSINOPHIL # BLD AUTO: 0.1 10E3/UL (ref 0–0.7)
EOSINOPHIL NFR BLD AUTO: 1 %
ERYTHROCYTE [DISTWIDTH] IN BLOOD BY AUTOMATED COUNT: 16.4 % (ref 10–15)
GFR SERPL CREATININE-BSD FRML MDRD: 85 ML/MIN/1.73M2
GLUCOSE BLD-MCNC: 118 MG/DL (ref 70–99)
HCT VFR BLD AUTO: 26.5 % (ref 40–53)
HGB BLD-MCNC: 8.9 G/DL (ref 13.3–17.7)
IMM GRANULOCYTES # BLD: 0.1 10E3/UL
IMM GRANULOCYTES NFR BLD: 1 %
INR PPP: 1.52 (ref 0.85–1.15)
LYMPHOCYTES # BLD AUTO: 1.5 10E3/UL (ref 0.8–5.3)
LYMPHOCYTES NFR BLD AUTO: 29 %
MCH RBC QN AUTO: 33.7 PG (ref 26.5–33)
MCHC RBC AUTO-ENTMCNC: 33.6 G/DL (ref 31.5–36.5)
MCV RBC AUTO: 100 FL (ref 78–100)
MONOCYTES # BLD AUTO: 0.7 10E3/UL (ref 0–1.3)
MONOCYTES NFR BLD AUTO: 14 %
NEUTROPHILS # BLD AUTO: 2.7 10E3/UL (ref 1.6–8.3)
NEUTROPHILS NFR BLD AUTO: 54 %
NRBC # BLD AUTO: 0 10E3/UL
NRBC BLD AUTO-RTO: 0 /100
PLATELET # BLD AUTO: 149 10E3/UL (ref 150–450)
POTASSIUM BLD-SCNC: 4.4 MMOL/L (ref 3.4–5.3)
RBC # BLD AUTO: 2.64 10E6/UL (ref 4.4–5.9)
SODIUM SERPL-SCNC: 130 MMOL/L (ref 133–144)
WBC # BLD AUTO: 5.1 10E3/UL (ref 4–11)

## 2022-01-21 PROCEDURE — 80048 BASIC METABOLIC PNL TOTAL CA: CPT

## 2022-01-21 PROCEDURE — P9047 ALBUMIN (HUMAN), 25%, 50ML: HCPCS | Performed by: PHYSICIAN ASSISTANT

## 2022-01-21 PROCEDURE — 85610 PROTHROMBIN TIME: CPT

## 2022-01-21 PROCEDURE — 272N000047 US PARACENTESIS

## 2022-01-21 PROCEDURE — 85025 COMPLETE CBC W/AUTO DIFF WBC: CPT

## 2022-01-21 PROCEDURE — 250N000009 HC RX 250: Performed by: RADIOLOGY

## 2022-01-21 PROCEDURE — 250N000011 HC RX IP 250 OP 636: Performed by: PHYSICIAN ASSISTANT

## 2022-01-21 PROCEDURE — 36415 COLL VENOUS BLD VENIPUNCTURE: CPT

## 2022-01-21 PROCEDURE — 82247 BILIRUBIN TOTAL: CPT

## 2022-01-21 RX ORDER — LIDOCAINE 40 MG/G
CREAM TOPICAL
Status: DISCONTINUED | OUTPATIENT
Start: 2022-01-21 | End: 2022-01-22 | Stop reason: HOSPADM

## 2022-01-21 RX ORDER — ALBUMIN (HUMAN) 12.5 G/50ML
12.5 SOLUTION INTRAVENOUS ONCE
Status: CANCELLED | OUTPATIENT
Start: 2022-01-21 | End: 2022-01-21

## 2022-01-21 RX ORDER — ALBUMIN (HUMAN) 12.5 G/50ML
50 SOLUTION INTRAVENOUS ONCE
Status: COMPLETED | OUTPATIENT
Start: 2022-01-21 | End: 2022-01-21

## 2022-01-21 RX ADMIN — ALBUMIN HUMAN 50 G: 0.25 SOLUTION INTRAVENOUS at 11:22

## 2022-01-21 RX ADMIN — LIDOCAINE HYDROCHLORIDE 5 ML: 10 INJECTION, SOLUTION EPIDURAL; INFILTRATION; INTRACAUDAL; PERINEURAL at 09:49

## 2022-01-21 NOTE — DISCHARGE INSTRUCTIONS
Radiology  Discharge Instructions for Abdominal Paracentesis    You have had an abdominal paracentesis procedure today.  A needle or catheter was put into your abdomen to remove fluid for laboratory studies and/or to remove the extra fluid from your abdomen.    AFTER YOU ARE HOME:    Rest at home today.    Limit physical activity such as lifting, straining, or exercise for 48 hours.  You may resume normal activity in 24 hours.    Resume previous diet and medications.    You may remove bandage in 24 hours.    CALL YOUR PRIMARY PROVIDER IF:    You develop temperature over 101o F, or redness at the drainage site.    You have any other questions or concerns.    AFTER HOURS CALL [facility] NURSE ADVISORS AT [phone]    COME TO EMERGENCY ROOM IF:    You develop severe abdominal pain, swelling the size of a baseball or larger, continuous oozing from puncture site longer than 24 hours, bleeding that saturates a gauze dressing even after you have put direct pressure on the site for 15 minutes, severe lightheadedness or fainting.  DO NOT DRIVE YOURSELF.      Your ordering physician will contact you with the laboratory results.

## 2022-01-21 NOTE — PROGRESS NOTES
LLQ paracentesis performed by radiologist. 6,900 Ml clear yellow fluid removed. Pressure held at site after catheter removed. No bleeding noted. Skin glue applied. Given Albumin 50gm per Dr Megan Braun.

## 2022-01-24 ENCOUNTER — HOSPITAL ENCOUNTER (OUTPATIENT)
Dept: PHYSICAL THERAPY | Facility: CLINIC | Age: 57
Setting detail: THERAPIES SERIES
End: 2022-01-24
Attending: INTERNAL MEDICINE
Payer: COMMERCIAL

## 2022-01-24 DIAGNOSIS — R60.0 LOWER EXTREMITY EDEMA: ICD-10-CM

## 2022-01-24 DIAGNOSIS — K74.60 HEPATIC CIRRHOSIS, UNSPECIFIED HEPATIC CIRRHOSIS TYPE (H): ICD-10-CM

## 2022-01-24 DIAGNOSIS — K70.31 ALCOHOLIC CIRRHOSIS OF LIVER WITH ASCITES (H): ICD-10-CM

## 2022-01-24 PROCEDURE — 97161 PT EVAL LOW COMPLEX 20 MIN: CPT | Mod: GP | Performed by: PHYSICAL THERAPIST

## 2022-01-24 PROCEDURE — 97140 MANUAL THERAPY 1/> REGIONS: CPT | Mod: GP | Performed by: PHYSICAL THERAPIST

## 2022-01-24 NOTE — PROGRESS NOTES
Outpatient Lymphedema Therapy Evaluation       01/24/22 1300   Rehab Discipline   Discipline PT   Type of Visit   Type of visit Initial Edema Evaluation       present No   General Information   Start of care 01/24/22   Referring physician Dr. Trish Cantu MD   Orders Evaluate and treat as indicated   Order date 01/06/22   Medical diagnosis alcoholic hepatitis with ascites   Edema onset 01/06/22  (date of referral)   Affected body parts RLE;LLE   Edema etiology comments 56 year old male with recent hospitalization with hepatatic encephalopathy; PMH: alcoholic hepatitis with ascites, hyponatremia, hypokalemia, hypoalbuminemia, fluid overload, hepatic encephalopathy, AB, anasarca, renal failure   Pertinent history of current problem (PT: include personal factors and/or comorbidities that impact the POC; OT: include additional occupational profile info) pt gets weekly patacentesis with albumin infusiosn; pt reports swelling comes and goes and is primarily in the feet, at least 2-4 months, pt reports no real pattern; pt reports he used to wrap with ACE bandages   Surgical / medical history reviewed Yes   Prior level of functional mobility pt reports he's pretty weak, pt reports he uses his wife for support and walls for support; wheelchairs into clinics and hospitals   Prior treatment   (no past lymphedema treatment)   Patient role / employment history   (employed but currently unable to work due to medical status)   Living environment House / townhome   Living environment comments lives with wife;  son here today   Assistive device comments no AD at home   Fall Risk Screen   Fall screen completed by PT   Have you fallen 2 or more times in the past year? Yes   Have you fallen and had an injury in the past year? No   Is patient a fall risk? Department fall risk interventions implemented   Fall screen comments pt reports he feels weak   Abuse Screen (yes response referral indicated)   Feels Unsafe  at Home or Work/School no   Feels Threatened by Someone no   Does Anyone Try to Keep You From Having Contact with Others or Doing Things Outside Your Home? no   Physical Signs of Abuse Present no   System Outcome Measures   Outcome Measures Lymphedema   Lymphedema Life Impact Scale (score range 0-72). A higher score indicates greater impairment. 37   Subjective Report   Patient report of symptoms legs are tender and tight and heavy   Precautions   Precautions comments no known precautions   Patient / Family Goals   Patient / family goals statement to make the swelling less and keep it down; get back in shoes   Pain   Patient currently in pain No   Cognitive Status   Orientation Orientation to person, place and time   Level of consciousness Alert   Follows commands and answers questions 100% of the time   Personal safety and judgement Intact   Memory Intact   Edema Exam / Assessment   Skin condition Pitting;Dryness   Skin condition comments BLE skin all intact, slightly dry throughout with 3+ pitting present feet to mid-calf and then 2+ to knee   Scar No   Capillary refill Symmetrical   Dorsal pedal pulse Decreased   Stemmer sign Negative   Ulceration No   Girth Measurements   Girth Measurements Refer to separate girth measurement flowsheet   Volume LE   Right LE (mL) 3975.41   Left LE (mL) 4343.59   LE volume comparison LLE volume greater than RLE volume   % difference +9.3%   Range of Motion   ROM No deficits were identified   Strength   Strength No deficits were identified   Posture   Posture Normal   Palpation   Palpation mild sensitivity with pitting assessment/palpation   Sensory   Sensory perception Light touch   Light touch Intact   Vascular Assessment   Vascular Assessment Comments no concerns   Coordination   Coordination Gross motor coordination appropriate   Muscle Tone   Muscle tone No deficits were identified   Planned Edema Interventions   Planned edema interventions Gradient compression bandaging;Fit  for compression garment;Exercises;Precautions to prevent infection / exacerbation;Education;Manual therapy;Skin care / precautions;Home management program development   Clinical Impression   Criteria for skilled therapeutic intervention met Yes   Therapy diagnosis BLE secondary lymphedema   Influenced by the following impairments / conditions Stage 2   Functional limitations due to impairments / conditions can only fit into larger pair of crocs, no regular shoes   Clinical Presentation Stable/Uncomplicated   Clinical Presentation Rationale clinical judgement; no weep/wounds   Clinical Decision Making (Complexity) Low complexity   Treatment Frequency 3x/week  (it decides to move forward with GCB)   Treatment duration 12 weeks   Patient / family and/or staff in agreement with plan of care Yes   Risks and benefits of therapy have been explained Yes   Education Assessment   Preferred learning style Listening   Barriers to learning No barriers   Goals   Edema Eval Goals 1;2;3;4   Goal 1   Goal identifier stg   Goal description pt to have around the clock tolerance to BLE GCB for edema reduction response   Target date 02/07/22   Goal 2   Goal identifier ltg   Goal description once appropriate, pt and/or family to be independent with donning, doffing and care of compression garments for longterm edema management for maintenance   Target date 04/14/22   Goal 3   Goal identifier ltg   Goal description pt to be independent with longterm edema management via HEP, elevation, skin cares and compression garment wear/use   Target date 04/24/22   Goal 4   Goal identifier ltg   Goal description pt to have at least 5 point improvement on LLIS due to decreased edema and asssociated symptoms in BLEs   Target date 04/24/22   Total Evaluation Time   PT Eval, Low Complexity Minutes (40838) 5

## 2022-01-28 ENCOUNTER — HOSPITAL ENCOUNTER (OUTPATIENT)
Dept: ULTRASOUND IMAGING | Facility: CLINIC | Age: 57
Discharge: HOME | End: 2022-01-28
Attending: PHYSICIAN ASSISTANT | Admitting: PHYSICIAN ASSISTANT
Payer: COMMERCIAL

## 2022-01-28 VITALS
SYSTOLIC BLOOD PRESSURE: 105 MMHG | RESPIRATION RATE: 16 BRPM | DIASTOLIC BLOOD PRESSURE: 70 MMHG | HEART RATE: 90 BPM | TEMPERATURE: 98.2 F

## 2022-01-28 DIAGNOSIS — K70.30 ALCOHOLIC CIRRHOSIS OF LIVER (H): ICD-10-CM

## 2022-01-28 DIAGNOSIS — K70.31 ALCOHOLIC CIRRHOSIS OF LIVER WITH ASCITES (H): ICD-10-CM

## 2022-01-28 LAB
ANION GAP SERPL CALCULATED.3IONS-SCNC: 6 MMOL/L (ref 3–14)
BASOPHILS # BLD AUTO: 0.1 10E3/UL (ref 0–0.2)
BASOPHILS NFR BLD AUTO: 1 %
BILIRUB SERPL-MCNC: 4.1 MG/DL (ref 0.2–1.3)
BUN SERPL-MCNC: 16 MG/DL (ref 7–30)
CALCIUM SERPL-MCNC: 8.3 MG/DL (ref 8.5–10.1)
CHLORIDE BLD-SCNC: 102 MMOL/L (ref 94–109)
CO2 SERPL-SCNC: 25 MMOL/L (ref 20–32)
CREAT SERPL-MCNC: 1.14 MG/DL (ref 0.66–1.25)
EOSINOPHIL # BLD AUTO: 0.1 10E3/UL (ref 0–0.7)
EOSINOPHIL NFR BLD AUTO: 2 %
ERYTHROCYTE [DISTWIDTH] IN BLOOD BY AUTOMATED COUNT: 16.4 % (ref 10–15)
GFR SERPL CREATININE-BSD FRML MDRD: 76 ML/MIN/1.73M2
GLUCOSE BLD-MCNC: 116 MG/DL (ref 70–99)
HCT VFR BLD AUTO: 25.2 % (ref 40–53)
HGB BLD-MCNC: 8.3 G/DL (ref 13.3–17.7)
IMM GRANULOCYTES # BLD: 0.1 10E3/UL
IMM GRANULOCYTES NFR BLD: 1 %
INR PPP: 1.74 (ref 0.85–1.15)
LYMPHOCYTES # BLD AUTO: 1.3 10E3/UL (ref 0.8–5.3)
LYMPHOCYTES NFR BLD AUTO: 26 %
MCH RBC QN AUTO: 33.5 PG (ref 26.5–33)
MCHC RBC AUTO-ENTMCNC: 32.9 G/DL (ref 31.5–36.5)
MCV RBC AUTO: 102 FL (ref 78–100)
MONOCYTES # BLD AUTO: 0.7 10E3/UL (ref 0–1.3)
MONOCYTES NFR BLD AUTO: 13 %
NEUTROPHILS # BLD AUTO: 2.9 10E3/UL (ref 1.6–8.3)
NEUTROPHILS NFR BLD AUTO: 57 %
NRBC # BLD AUTO: 0 10E3/UL
NRBC BLD AUTO-RTO: 0 /100
PLATELET # BLD AUTO: 141 10E3/UL (ref 150–450)
POTASSIUM BLD-SCNC: 4 MMOL/L (ref 3.4–5.3)
RBC # BLD AUTO: 2.48 10E6/UL (ref 4.4–5.9)
SODIUM SERPL-SCNC: 133 MMOL/L (ref 133–144)
WBC # BLD AUTO: 5 10E3/UL (ref 4–11)

## 2022-01-28 PROCEDURE — 36415 COLL VENOUS BLD VENIPUNCTURE: CPT

## 2022-01-28 PROCEDURE — 85014 HEMATOCRIT: CPT

## 2022-01-28 PROCEDURE — 85610 PROTHROMBIN TIME: CPT

## 2022-01-28 PROCEDURE — 80048 BASIC METABOLIC PNL TOTAL CA: CPT

## 2022-01-28 PROCEDURE — 250N000011 HC RX IP 250 OP 636: Performed by: PHYSICIAN ASSISTANT

## 2022-01-28 PROCEDURE — P9047 ALBUMIN (HUMAN), 25%, 50ML: HCPCS | Performed by: PHYSICIAN ASSISTANT

## 2022-01-28 PROCEDURE — 82247 BILIRUBIN TOTAL: CPT

## 2022-01-28 PROCEDURE — 250N000009 HC RX 250: Performed by: RADIOLOGY

## 2022-01-28 PROCEDURE — 272N000047 US PARACENTESIS

## 2022-01-28 RX ORDER — ALBUMIN (HUMAN) 12.5 G/50ML
12.5 SOLUTION INTRAVENOUS ONCE
Status: CANCELLED | OUTPATIENT
Start: 2022-01-28 | End: 2022-01-28

## 2022-01-28 RX ORDER — ALBUMIN (HUMAN) 12.5 G/50ML
50 SOLUTION INTRAVENOUS ONCE
Status: COMPLETED | OUTPATIENT
Start: 2022-01-28 | End: 2022-01-28

## 2022-01-28 RX ORDER — LIDOCAINE 40 MG/G
CREAM TOPICAL
Status: DISCONTINUED | OUTPATIENT
Start: 2022-01-28 | End: 2022-01-29 | Stop reason: HOSPADM

## 2022-01-28 RX ADMIN — ALBUMIN HUMAN 50 G: 0.25 SOLUTION INTRAVENOUS at 13:52

## 2022-01-28 RX ADMIN — LIDOCAINE HYDROCHLORIDE 8 ML: 10 INJECTION, SOLUTION EPIDURAL; INFILTRATION; INTRACAUDAL; PERINEURAL at 12:49

## 2022-01-28 NOTE — PROGRESS NOTES
RLQ paracentesis performed by radiologist.   7300 ml straw-colored fluid removed. Pressure held at site after catheter removed. No bleeding noted. Given Albumin per Annemarie Megan de Pinto order .

## 2022-01-28 NOTE — DISCHARGE INSTRUCTIONS
Radiology  Discharge Instructions for Abdominal Paracentesis    You have had an abdominal paracentesis procedure today.  A needle or catheter was put into your abdomen to remove fluid for laboratory studies and/or to remove the extra fluid from your abdomen.    AFTER YOU ARE HOME:    Rest at home today.    Limit physical activity such as lifting, straining, or exercise for 48 hours.  You may resume normal activity in 24 hours.    Resume previous diet and medications.    You may remove bandage in 24 hours.    CALL YOUR PRIMARY PROVIDER IF:    You develop temperature over 101o F, or redness at the drainage site.    You have any other questions or concerns.    AFTER HOURS CALL [facility] NURSE ADVISORS AT [phone]    COME TO EMERGENCY ROOM IF:    You develop severe abdominal pain, swelling the size of a baseball or larger, continuous oozing from puncture site longer than 24 hours, bleeding that saturates a gauze dressing even after you have put direct pressure on the site for 15 minutes, severe lightheadedness or fainting.  DO NOT DRIVE YOURSELF.

## 2022-02-04 ENCOUNTER — LAB (OUTPATIENT)
Dept: LAB | Facility: CLINIC | Age: 57
End: 2022-02-04
Payer: COMMERCIAL

## 2022-02-04 ENCOUNTER — HOSPITAL ENCOUNTER (OUTPATIENT)
Dept: ULTRASOUND IMAGING | Facility: CLINIC | Age: 57
Discharge: HOME | End: 2022-02-04
Attending: PHYSICIAN ASSISTANT | Admitting: PHYSICIAN ASSISTANT
Payer: COMMERCIAL

## 2022-02-04 VITALS
TEMPERATURE: 97.5 F | RESPIRATION RATE: 17 BRPM | SYSTOLIC BLOOD PRESSURE: 109 MMHG | HEART RATE: 86 BPM | DIASTOLIC BLOOD PRESSURE: 75 MMHG

## 2022-02-04 DIAGNOSIS — K70.30 ALCOHOLIC CIRRHOSIS OF LIVER (H): ICD-10-CM

## 2022-02-04 DIAGNOSIS — K70.31 ALCOHOLIC CIRRHOSIS OF LIVER WITH ASCITES (H): ICD-10-CM

## 2022-02-04 LAB
ANION GAP SERPL CALCULATED.3IONS-SCNC: 8 MMOL/L (ref 3–14)
BASOPHILS # BLD AUTO: 0.1 10E3/UL (ref 0–0.2)
BASOPHILS NFR BLD AUTO: 1 %
BILIRUB SERPL-MCNC: 4.2 MG/DL (ref 0.2–1.3)
BUN SERPL-MCNC: 16 MG/DL (ref 7–30)
CALCIUM SERPL-MCNC: 8 MG/DL (ref 8.5–10.1)
CHLORIDE BLD-SCNC: 101 MMOL/L (ref 94–109)
CO2 SERPL-SCNC: 23 MMOL/L (ref 20–32)
CREAT SERPL-MCNC: 1.07 MG/DL (ref 0.66–1.25)
EOSINOPHIL # BLD AUTO: 0.1 10E3/UL (ref 0–0.7)
EOSINOPHIL NFR BLD AUTO: 2 %
ERYTHROCYTE [DISTWIDTH] IN BLOOD BY AUTOMATED COUNT: 15.4 % (ref 10–15)
GFR SERPL CREATININE-BSD FRML MDRD: 82 ML/MIN/1.73M2
GLUCOSE BLD-MCNC: 125 MG/DL (ref 70–99)
HCT VFR BLD AUTO: 28.3 % (ref 40–53)
HGB BLD-MCNC: 9.2 G/DL (ref 13.3–17.7)
INR PPP: 1.73 (ref 0.85–1.15)
LYMPHOCYTES # BLD AUTO: 1.8 10E3/UL (ref 0.8–5.3)
LYMPHOCYTES NFR BLD AUTO: 32 %
MCH RBC QN AUTO: 33.2 PG (ref 26.5–33)
MCHC RBC AUTO-ENTMCNC: 32.5 G/DL (ref 31.5–36.5)
MCV RBC AUTO: 102 FL (ref 78–100)
MONOCYTES # BLD AUTO: 0.6 10E3/UL (ref 0–1.3)
MONOCYTES NFR BLD AUTO: 10 %
NEUTROPHILS # BLD AUTO: 3.1 10E3/UL (ref 1.6–8.3)
NEUTROPHILS NFR BLD AUTO: 54 %
PLATELET # BLD AUTO: 178 10E3/UL (ref 150–450)
POTASSIUM BLD-SCNC: 3.6 MMOL/L (ref 3.4–5.3)
RBC # BLD AUTO: 2.77 10E6/UL (ref 4.4–5.9)
SODIUM SERPL-SCNC: 132 MMOL/L (ref 133–144)
WBC # BLD AUTO: 5.7 10E3/UL (ref 4–11)

## 2022-02-04 PROCEDURE — 36415 COLL VENOUS BLD VENIPUNCTURE: CPT

## 2022-02-04 PROCEDURE — 80048 BASIC METABOLIC PNL TOTAL CA: CPT

## 2022-02-04 PROCEDURE — P9047 ALBUMIN (HUMAN), 25%, 50ML: HCPCS | Performed by: PHYSICIAN ASSISTANT

## 2022-02-04 PROCEDURE — 272N000047 US PARACENTESIS

## 2022-02-04 PROCEDURE — 85025 COMPLETE CBC W/AUTO DIFF WBC: CPT

## 2022-02-04 PROCEDURE — 250N000011 HC RX IP 250 OP 636: Performed by: PHYSICIAN ASSISTANT

## 2022-02-04 PROCEDURE — 85610 PROTHROMBIN TIME: CPT

## 2022-02-04 PROCEDURE — 82247 BILIRUBIN TOTAL: CPT

## 2022-02-04 PROCEDURE — 250N000009 HC RX 250: Performed by: RADIOLOGY

## 2022-02-04 RX ORDER — ALBUMIN (HUMAN) 12.5 G/50ML
50 SOLUTION INTRAVENOUS ONCE
Status: COMPLETED | OUTPATIENT
Start: 2022-02-04 | End: 2022-02-04

## 2022-02-04 RX ORDER — ALBUMIN (HUMAN) 12.5 G/50ML
12.5 SOLUTION INTRAVENOUS ONCE
Status: CANCELLED | OUTPATIENT
Start: 2022-02-04 | End: 2022-02-04

## 2022-02-04 RX ORDER — LIDOCAINE 40 MG/G
CREAM TOPICAL
Status: DISCONTINUED | OUTPATIENT
Start: 2022-02-04 | End: 2022-02-05 | Stop reason: HOSPADM

## 2022-02-04 RX ADMIN — LIDOCAINE HYDROCHLORIDE 8 ML: 10 INJECTION, SOLUTION EPIDURAL; INFILTRATION; INTRACAUDAL; PERINEURAL at 12:33

## 2022-02-04 RX ADMIN — ALBUMIN HUMAN 50 G: 0.25 SOLUTION INTRAVENOUS at 13:21

## 2022-02-04 NOTE — PROGRESS NOTES
RLQ paracentesis performed by radiologist.8,100  Ml clear yellow fluid removed. Pressure held at site after catheter removed. No bleeding noted. Skin glue applied. Given Albumin per Dr Megan Braun.

## 2022-02-04 NOTE — DISCHARGE INSTRUCTIONS
Radiology  Discharge Instructions for Abdominal Paracentesis    You have had an abdominal paracentesis procedure today.  A needle or catheter was put into your abdomen to remove fluid for laboratory studies and/or to remove the extra fluid from your abdomen.    AFTER YOU ARE HOME:    Rest at home today.    Limit physical activity such as lifting, straining, or exercise for 48 hours.  You may resume normal activity in 24 hours.    Resume previous diet and medications.    You may remove bandage in 24 hours.    CALL YOUR PRIMARY PROVIDER IF:    You develop temperature over 101o F, or redness at the drainage site.    You have any other questions or concerns.    AFTER HOURS CALL [facility] NURSE ADVISORS AT [phone]    COME TO EMERGENCY ROOM IF:    You develop severe abdominal pain, swelling the size of a baseball or larger, continuous oozing from puncture site longer than 24 hours, bleeding that saturates a gauze dressing even after you have put direct pressure on the site for 15 minutes, severe lightheadedness or fainting.  DO NOT DRIVE YOURSELF.

## 2022-02-11 ENCOUNTER — HOSPITAL ENCOUNTER (OUTPATIENT)
Dept: ULTRASOUND IMAGING | Facility: CLINIC | Age: 57
Discharge: HOME | End: 2022-02-11
Attending: PHYSICIAN ASSISTANT | Admitting: PHYSICIAN ASSISTANT
Payer: COMMERCIAL

## 2022-02-11 ENCOUNTER — LAB (OUTPATIENT)
Dept: LAB | Facility: CLINIC | Age: 57
End: 2022-02-11
Payer: COMMERCIAL

## 2022-02-11 VITALS
RESPIRATION RATE: 16 BRPM | HEART RATE: 89 BPM | DIASTOLIC BLOOD PRESSURE: 69 MMHG | TEMPERATURE: 97.7 F | SYSTOLIC BLOOD PRESSURE: 97 MMHG

## 2022-02-11 DIAGNOSIS — K70.31 ALCOHOLIC CIRRHOSIS OF LIVER WITH ASCITES (H): ICD-10-CM

## 2022-02-11 DIAGNOSIS — K70.30 ALCOHOLIC CIRRHOSIS OF LIVER (H): ICD-10-CM

## 2022-02-11 LAB
ANION GAP SERPL CALCULATED.3IONS-SCNC: 6 MMOL/L (ref 3–14)
BASOPHILS # BLD AUTO: 0 10E3/UL (ref 0–0.2)
BASOPHILS NFR BLD AUTO: 1 %
BILIRUB SERPL-MCNC: 3.8 MG/DL (ref 0.2–1.3)
BUN SERPL-MCNC: 12 MG/DL (ref 7–30)
CALCIUM SERPL-MCNC: 8.7 MG/DL (ref 8.5–10.1)
CHLORIDE BLD-SCNC: 100 MMOL/L (ref 94–109)
CO2 SERPL-SCNC: 26 MMOL/L (ref 20–32)
CREAT SERPL-MCNC: 1.05 MG/DL (ref 0.66–1.25)
EOSINOPHIL # BLD AUTO: 0.1 10E3/UL (ref 0–0.7)
EOSINOPHIL NFR BLD AUTO: 2 %
ERYTHROCYTE [DISTWIDTH] IN BLOOD BY AUTOMATED COUNT: 14.9 % (ref 10–15)
GFR SERPL CREATININE-BSD FRML MDRD: 84 ML/MIN/1.73M2
GLUCOSE BLD-MCNC: 106 MG/DL (ref 70–99)
HCT VFR BLD AUTO: 25.3 % (ref 40–53)
HGB BLD-MCNC: 8.2 G/DL (ref 13.3–17.7)
INR PPP: 1.77 (ref 0.85–1.15)
LYMPHOCYTES # BLD AUTO: 1.1 10E3/UL (ref 0.8–5.3)
LYMPHOCYTES NFR BLD AUTO: 25 %
MCH RBC QN AUTO: 32.9 PG (ref 26.5–33)
MCHC RBC AUTO-ENTMCNC: 32.4 G/DL (ref 31.5–36.5)
MCV RBC AUTO: 102 FL (ref 78–100)
MONOCYTES # BLD AUTO: 0.5 10E3/UL (ref 0–1.3)
MONOCYTES NFR BLD AUTO: 11 %
NEUTROPHILS # BLD AUTO: 2.7 10E3/UL (ref 1.6–8.3)
NEUTROPHILS NFR BLD AUTO: 62 %
PLATELET # BLD AUTO: 131 10E3/UL (ref 150–450)
POTASSIUM BLD-SCNC: 3.9 MMOL/L (ref 3.4–5.3)
RBC # BLD AUTO: 2.49 10E6/UL (ref 4.4–5.9)
SODIUM SERPL-SCNC: 132 MMOL/L (ref 133–144)
WBC # BLD AUTO: 4.3 10E3/UL (ref 4–11)

## 2022-02-11 PROCEDURE — 82247 BILIRUBIN TOTAL: CPT

## 2022-02-11 PROCEDURE — 80048 BASIC METABOLIC PNL TOTAL CA: CPT

## 2022-02-11 PROCEDURE — 272N000706 US PARACENTESIS

## 2022-02-11 PROCEDURE — 85025 COMPLETE CBC W/AUTO DIFF WBC: CPT

## 2022-02-11 PROCEDURE — 250N000009 HC RX 250: Performed by: RADIOLOGY

## 2022-02-11 PROCEDURE — 250N000011 HC RX IP 250 OP 636: Performed by: PHYSICIAN ASSISTANT

## 2022-02-11 PROCEDURE — 85610 PROTHROMBIN TIME: CPT

## 2022-02-11 PROCEDURE — P9047 ALBUMIN (HUMAN), 25%, 50ML: HCPCS | Performed by: PHYSICIAN ASSISTANT

## 2022-02-11 PROCEDURE — 36415 COLL VENOUS BLD VENIPUNCTURE: CPT

## 2022-02-11 RX ORDER — LIDOCAINE 40 MG/G
CREAM TOPICAL
Status: DISCONTINUED | OUTPATIENT
Start: 2022-02-11 | End: 2022-02-12 | Stop reason: HOSPADM

## 2022-02-11 RX ORDER — ALBUMIN (HUMAN) 12.5 G/50ML
50 SOLUTION INTRAVENOUS ONCE
Status: COMPLETED | OUTPATIENT
Start: 2022-02-11 | End: 2022-02-11

## 2022-02-11 RX ORDER — ALBUMIN (HUMAN) 12.5 G/50ML
12.5 SOLUTION INTRAVENOUS ONCE
Status: CANCELLED | OUTPATIENT
Start: 2022-02-11 | End: 2022-02-11

## 2022-02-11 RX ADMIN — LIDOCAINE HYDROCHLORIDE 5 ML: 10 INJECTION, SOLUTION EPIDURAL; INFILTRATION; INTRACAUDAL; PERINEURAL at 12:14

## 2022-02-11 RX ADMIN — ALBUMIN HUMAN 50 G: 0.25 SOLUTION INTRAVENOUS at 13:04

## 2022-02-11 NOTE — PROGRESS NOTES
RLQ paracentesis performed by radiologist.   7800 ml clear yellow fluid removed. Pressure held at site after catheter removed. No bleeding noted. Given Albumin 50 g per Annemarie Ferrez de Pinto.

## 2022-02-11 NOTE — DISCHARGE INSTRUCTIONS
Radiology  Discharge Instructions for Abdominal Paracentesis    You have had an abdominal paracentesis procedure today.  A needle or catheter was put into your abdomen to remove fluid for laboratory studies and/or to remove the extra fluid from your abdomen.    AFTER YOU ARE HOME:    Rest at home today.    Limit physical activity such as lifting, straining, or exercise for 48 hours.  You may resume normal activity in 24 hours.    Resume previous diet and medications.    You may remove bandage in 24 hours.    CALL YOUR PRIMARY PROVIDER IF:    You develop temperature over 101o F, or redness at the drainage site.    You have any other questions or concerns.    AFTER HOURS CALL [facility] NURSE ADVISORS AT [phone]    COME TO EMERGENCY ROOM IF:    You develop severe abdominal pain, swelling the size of a baseball or larger, continuous oozing from puncture site longer than 24 hours, bleeding that saturates a gauze dressing even after you have put direct pressure on the site for 15 minutes, severe lightheadedness or fainting.  DO NOT DRIVE YOURSELF.

## 2022-02-17 ENCOUNTER — HOSPITAL ENCOUNTER (OUTPATIENT)
Dept: ULTRASOUND IMAGING | Facility: CLINIC | Age: 57
Discharge: HOME | End: 2022-02-17
Attending: INTERNAL MEDICINE | Admitting: INTERNAL MEDICINE
Payer: COMMERCIAL

## 2022-02-17 VITALS — HEART RATE: 87 BPM | SYSTOLIC BLOOD PRESSURE: 97 MMHG | DIASTOLIC BLOOD PRESSURE: 60 MMHG | RESPIRATION RATE: 18 BRPM

## 2022-02-17 DIAGNOSIS — R18.8 OTHER ASCITES: ICD-10-CM

## 2022-02-17 PROCEDURE — 250N000011 HC RX IP 250 OP 636: Performed by: INTERNAL MEDICINE

## 2022-02-17 PROCEDURE — P9047 ALBUMIN (HUMAN), 25%, 50ML: HCPCS | Performed by: INTERNAL MEDICINE

## 2022-02-17 PROCEDURE — 272N000706 US PARACENTESIS

## 2022-02-17 PROCEDURE — 250N000009 HC RX 250: Performed by: RADIOLOGY

## 2022-02-17 RX ORDER — ALBUMIN (HUMAN) 12.5 G/50ML
50 SOLUTION INTRAVENOUS ONCE
Status: COMPLETED | OUTPATIENT
Start: 2022-02-17 | End: 2022-02-17

## 2022-02-17 RX ORDER — LIDOCAINE 40 MG/G
CREAM TOPICAL
Status: DISCONTINUED | OUTPATIENT
Start: 2022-02-17 | End: 2022-02-18 | Stop reason: HOSPADM

## 2022-02-17 RX ORDER — ALBUMIN (HUMAN) 12.5 G/50ML
12.5 SOLUTION INTRAVENOUS ONCE
Status: CANCELLED | OUTPATIENT
Start: 2022-02-17 | End: 2022-02-17

## 2022-02-17 RX ADMIN — LIDOCAINE HYDROCHLORIDE 7 ML: 10 INJECTION, SOLUTION EPIDURAL; INFILTRATION; INTRACAUDAL; PERINEURAL at 12:50

## 2022-02-17 RX ADMIN — ALBUMIN HUMAN 50 G: 0.25 SOLUTION INTRAVENOUS at 14:25

## 2022-02-17 NOTE — DISCHARGE INSTRUCTIONS
Radiology  Discharge Instructions for Abdominal Paracentesis    You have had an abdominal paracentesis procedure today.  A needle or catheter was put into your abdomen to remove fluid for laboratory studies and/or to remove the extra fluid from your abdomen.    AFTER YOU ARE HOME:    Rest at home today.    Limit physical activity such as lifting, straining, or exercise for 48 hours.  You may resume normal activity in 24 hours.    Resume previous diet and medications.    You may remove bandage in 24 hours.    CALL YOUR PRIMARY PROVIDER IF:    You develop temperature over 101o F, or redness at the drainage site.    You have any other questions or concerns.    AFTER HOURS CALL [facility] NURSE ADVISORS AT [phone]    COME TO EMERGENCY ROOM IF:    You develop severe abdominal pain, swelling the size of a baseball or larger, continuous oozing from puncture site longer than 24 hours, bleeding that saturates a gauze dressing even after you have put direct pressure on the site for 15 minutes, severe lightheadedness or fainting.  DO NOT DRIVE YOURSELF.

## 2022-02-17 NOTE — PROGRESS NOTES
RLQ paracentesis performed by radiologist. 6,500 Ml clear yellow fluid removed. Pressure held at site after catheter removed. No bleeding noted. Skin glue applied.  Given Albumin per Dr Pepe copeland.

## 2022-02-25 ENCOUNTER — HOSPITAL ENCOUNTER (OUTPATIENT)
Dept: ULTRASOUND IMAGING | Facility: CLINIC | Age: 57
Discharge: HOME | End: 2022-02-25
Attending: INTERNAL MEDICINE | Admitting: INTERNAL MEDICINE
Payer: COMMERCIAL

## 2022-02-25 ENCOUNTER — LAB (OUTPATIENT)
Dept: LAB | Facility: CLINIC | Age: 57
End: 2022-02-25
Payer: COMMERCIAL

## 2022-02-25 VITALS
HEART RATE: 82 BPM | TEMPERATURE: 98.3 F | SYSTOLIC BLOOD PRESSURE: 101 MMHG | DIASTOLIC BLOOD PRESSURE: 65 MMHG | RESPIRATION RATE: 16 BRPM

## 2022-02-25 DIAGNOSIS — R18.8 OTHER ASCITES: ICD-10-CM

## 2022-02-25 DIAGNOSIS — K70.30 ALCOHOLIC CIRRHOSIS OF LIVER (H): ICD-10-CM

## 2022-02-25 LAB
ANION GAP SERPL CALCULATED.3IONS-SCNC: 6 MMOL/L (ref 3–14)
BASOPHILS # BLD AUTO: 0 10E3/UL (ref 0–0.2)
BASOPHILS NFR BLD AUTO: 0 %
BILIRUB SERPL-MCNC: 3.6 MG/DL (ref 0.2–1.3)
BUN SERPL-MCNC: 14 MG/DL (ref 7–30)
CALCIUM SERPL-MCNC: 8.5 MG/DL (ref 8.5–10.1)
CHLORIDE BLD-SCNC: 97 MMOL/L (ref 94–109)
CO2 SERPL-SCNC: 26 MMOL/L (ref 20–32)
CREAT SERPL-MCNC: 1.25 MG/DL (ref 0.66–1.25)
EOSINOPHIL # BLD AUTO: 0.1 10E3/UL (ref 0–0.7)
EOSINOPHIL NFR BLD AUTO: 1 %
ERYTHROCYTE [DISTWIDTH] IN BLOOD BY AUTOMATED COUNT: 14.8 % (ref 10–15)
GFR SERPL CREATININE-BSD FRML MDRD: 68 ML/MIN/1.73M2
GLUCOSE BLD-MCNC: 163 MG/DL (ref 70–99)
HCT VFR BLD AUTO: 28.1 % (ref 40–53)
HGB BLD-MCNC: 9.3 G/DL (ref 13.3–17.7)
INR PPP: 1.77 (ref 0.85–1.15)
LYMPHOCYTES # BLD AUTO: 1.4 10E3/UL (ref 0.8–5.3)
LYMPHOCYTES NFR BLD AUTO: 25 %
MCH RBC QN AUTO: 33.2 PG (ref 26.5–33)
MCHC RBC AUTO-ENTMCNC: 33.1 G/DL (ref 31.5–36.5)
MCV RBC AUTO: 100 FL (ref 78–100)
MONOCYTES # BLD AUTO: 0.7 10E3/UL (ref 0–1.3)
MONOCYTES NFR BLD AUTO: 13 %
NEUTROPHILS # BLD AUTO: 3.4 10E3/UL (ref 1.6–8.3)
NEUTROPHILS NFR BLD AUTO: 61 %
PLATELET # BLD AUTO: 159 10E3/UL (ref 150–450)
POTASSIUM BLD-SCNC: 3.8 MMOL/L (ref 3.4–5.3)
RBC # BLD AUTO: 2.8 10E6/UL (ref 4.4–5.9)
SODIUM SERPL-SCNC: 129 MMOL/L (ref 133–144)
WBC # BLD AUTO: 5.6 10E3/UL (ref 4–11)

## 2022-02-25 PROCEDURE — 250N000011 HC RX IP 250 OP 636: Performed by: INTERNAL MEDICINE

## 2022-02-25 PROCEDURE — 250N000009 HC RX 250: Performed by: RADIOLOGY

## 2022-02-25 PROCEDURE — 85025 COMPLETE CBC W/AUTO DIFF WBC: CPT

## 2022-02-25 PROCEDURE — 82247 BILIRUBIN TOTAL: CPT

## 2022-02-25 PROCEDURE — P9047 ALBUMIN (HUMAN), 25%, 50ML: HCPCS | Performed by: INTERNAL MEDICINE

## 2022-02-25 PROCEDURE — 80048 BASIC METABOLIC PNL TOTAL CA: CPT

## 2022-02-25 PROCEDURE — 85610 PROTHROMBIN TIME: CPT

## 2022-02-25 PROCEDURE — 272N000706 US PARACENTESIS

## 2022-02-25 PROCEDURE — 36415 COLL VENOUS BLD VENIPUNCTURE: CPT

## 2022-02-25 RX ORDER — LIDOCAINE 40 MG/G
CREAM TOPICAL
Status: DISCONTINUED | OUTPATIENT
Start: 2022-02-25 | End: 2022-02-26 | Stop reason: HOSPADM

## 2022-02-25 RX ORDER — ALBUMIN (HUMAN) 12.5 G/50ML
25 SOLUTION INTRAVENOUS ONCE
Status: COMPLETED | OUTPATIENT
Start: 2022-02-25 | End: 2022-02-25

## 2022-02-25 RX ADMIN — ALBUMIN HUMAN 25 G: 0.25 SOLUTION INTRAVENOUS at 14:25

## 2022-02-25 RX ADMIN — LIDOCAINE HYDROCHLORIDE 9 ML: 10 INJECTION, SOLUTION EPIDURAL; INFILTRATION; INTRACAUDAL; PERINEURAL at 13:02

## 2022-02-25 NOTE — PROGRESS NOTES
RLQ paracentesis performed by radiologist.   5200  Ml clear yellow fluid removed. Pressure held at site after catheter removed. No bleeding noted. Given Albumin per specific patient orders Dr. Cantu.

## 2022-02-25 NOTE — DISCHARGE INSTRUCTIONS
Radiology  Discharge Instructions for Abdominal Paracentesis    You have had an abdominal paracentesis procedure today.  A needle or catheter was put into your abdomen to remove the extra fluid from your abdomen. 5200mls    AFTER YOU ARE HOME:    Rest at home today.    Limit physical activity such as lifting, straining, or exercise for 48 hours.  You may resume normal activity in 24 hours.    Resume previous diet and medications.    You may remove bandage in 24 hours.    CALL YOUR PRIMARY PROVIDER IF:    You develop temperature over 101o F, or redness at the drainage site.    You have any other questions or concerns.    AFTER HOURS CALL [facility] NURSE ADVISORS AT [phone]    COME TO EMERGENCY ROOM IF:    You develop severe abdominal pain, swelling the size of a baseball or larger, continuous oozing from puncture site longer than 24 hours, bleeding that saturates a gauze dressing even after you have put direct pressure on the site for 15 minutes, severe lightheadedness or fainting.  DO NOT DRIVE YOURSELF.       Date: 2/25/2022

## 2022-03-03 DIAGNOSIS — R18.8 ASCITES: Primary | ICD-10-CM

## 2022-03-03 DIAGNOSIS — K70.30 CIRRHOSIS, ALCOHOLIC (H): ICD-10-CM

## 2022-03-03 DIAGNOSIS — K70.30 ALCOHOLIC CIRRHOSIS OF LIVER (H): Primary | ICD-10-CM

## 2022-03-04 ENCOUNTER — HOSPITAL ENCOUNTER (OUTPATIENT)
Dept: ULTRASOUND IMAGING | Facility: CLINIC | Age: 57
Discharge: HOME | End: 2022-03-04
Attending: INTERNAL MEDICINE | Admitting: INTERNAL MEDICINE
Payer: COMMERCIAL

## 2022-03-04 ENCOUNTER — LAB (OUTPATIENT)
Dept: LAB | Facility: CLINIC | Age: 57
End: 2022-03-04
Payer: COMMERCIAL

## 2022-03-04 VITALS — SYSTOLIC BLOOD PRESSURE: 116 MMHG | RESPIRATION RATE: 17 BRPM | DIASTOLIC BLOOD PRESSURE: 76 MMHG | HEART RATE: 80 BPM

## 2022-03-04 DIAGNOSIS — R18.8 ASCITES: ICD-10-CM

## 2022-03-04 DIAGNOSIS — K70.30 CIRRHOSIS, ALCOHOLIC (H): ICD-10-CM

## 2022-03-04 DIAGNOSIS — R18.8 OTHER ASCITES: ICD-10-CM

## 2022-03-04 DIAGNOSIS — K70.30 ALCOHOLIC CIRRHOSIS OF LIVER (H): ICD-10-CM

## 2022-03-04 LAB
AFP SERPL-MCNC: 3.8 UG/L (ref 0–8)
ALBUMIN SERPL-MCNC: 2.5 G/DL (ref 3.4–5)
ALP SERPL-CCNC: 107 U/L (ref 40–150)
ALT SERPL W P-5'-P-CCNC: 16 U/L (ref 0–70)
ANION GAP SERPL CALCULATED.3IONS-SCNC: 8 MMOL/L (ref 3–14)
AST SERPL W P-5'-P-CCNC: 35 U/L (ref 0–45)
BILIRUB DIRECT SERPL-MCNC: 1.5 MG/DL (ref 0–0.2)
BILIRUB SERPL-MCNC: 3.1 MG/DL (ref 0.2–1.3)
BUN SERPL-MCNC: 13 MG/DL (ref 7–30)
CALCIUM SERPL-MCNC: 8.8 MG/DL (ref 8.5–10.1)
CHLORIDE BLD-SCNC: 102 MMOL/L (ref 94–109)
CO2 SERPL-SCNC: 24 MMOL/L (ref 20–32)
CREAT SERPL-MCNC: 1.43 MG/DL (ref 0.66–1.25)
ERYTHROCYTE [DISTWIDTH] IN BLOOD BY AUTOMATED COUNT: 14.6 % (ref 10–15)
GFR SERPL CREATININE-BSD FRML MDRD: 58 ML/MIN/1.73M2
GLUCOSE BLD-MCNC: 115 MG/DL (ref 70–99)
HCT VFR BLD AUTO: 29.8 % (ref 40–53)
HGB BLD-MCNC: 9.7 G/DL (ref 13.3–17.7)
INR PPP: 1.63 (ref 0.85–1.15)
MCH RBC QN AUTO: 32.1 PG (ref 26.5–33)
MCHC RBC AUTO-ENTMCNC: 32.6 G/DL (ref 31.5–36.5)
MCV RBC AUTO: 99 FL (ref 78–100)
PLATELET # BLD AUTO: 163 10E3/UL (ref 150–450)
POTASSIUM BLD-SCNC: 3.9 MMOL/L (ref 3.4–5.3)
PROT SERPL-MCNC: 6.8 G/DL (ref 6.8–8.8)
RBC # BLD AUTO: 3.02 10E6/UL (ref 4.4–5.9)
SODIUM SERPL-SCNC: 134 MMOL/L (ref 133–144)
WBC # BLD AUTO: 5.5 10E3/UL (ref 4–11)

## 2022-03-04 PROCEDURE — 82248 BILIRUBIN DIRECT: CPT

## 2022-03-04 PROCEDURE — 36415 COLL VENOUS BLD VENIPUNCTURE: CPT

## 2022-03-04 PROCEDURE — 82105 ALPHA-FETOPROTEIN SERUM: CPT

## 2022-03-04 PROCEDURE — 272N000706 US PARACENTESIS

## 2022-03-04 PROCEDURE — 85027 COMPLETE CBC AUTOMATED: CPT

## 2022-03-04 PROCEDURE — 85610 PROTHROMBIN TIME: CPT

## 2022-03-04 PROCEDURE — 80053 COMPREHEN METABOLIC PANEL: CPT

## 2022-03-04 RX ORDER — LIDOCAINE 40 MG/G
CREAM TOPICAL
Status: DISCONTINUED | OUTPATIENT
Start: 2022-03-04 | End: 2022-03-05 | Stop reason: HOSPADM

## 2022-03-04 RX ORDER — ALBUMIN (HUMAN) 12.5 G/50ML
12.5 SOLUTION INTRAVENOUS ONCE
Status: CANCELLED | OUTPATIENT
Start: 2022-03-04 | End: 2022-03-04

## 2022-03-04 NOTE — PROGRESS NOTES
RLQ paracentesis performed by radiologist. <5cc  Ml clear yellow fluid removed. Pressure held at site after catheter removed. No bleeding noted. No  Albumin per protocol.

## 2022-03-04 NOTE — DISCHARGE INSTRUCTIONS
Radiology  Discharge Instructions for Abdominal Paracentesis    You have had an abdominal paracentesis procedure today.  A needle or catheter was put into your abdomen to remove fluid for laboratory studies and/or to remove the extra fluid from your abdomen.    AFTER YOU ARE HOME:    Rest at home today.    Limit physical activity such as lifting, straining, or exercise for 48 hours.  You may resume normal activity in 24 hours.    Resume previous diet and medications.    You may remove bandage in 24 hours.    CALL YOUR PRIMARY PROVIDER IF:    You develop temperature over 101o F, or redness at the drainage site.    You have any other questions or concerns.    AFTER HOURS CALL [facility] NURSE ADVISORS AT [phone]    COME TO EMERGENCY ROOM IF:    You develop severe abdominal pain, swelling the size of a baseball or larger, continuous oozing from puncture site longer than 24 hours, bleeding that saturates a gauze dressing even after you have put direct pressure on the site for 15 minutes, severe lightheadedness or fainting.  DO NOT DRIVE YOURSELF.

## 2022-03-07 NOTE — PROGRESS NOTES
[facility]    Outpatient Physical Therapy Discharge Note  Patient: Oscar Greenberg  : 1965    Beginning/End Dates of Reporting Period:  22 to 22    Referring Provider: Dr. Trish Cantu MD    Therapy Diagnosis: BLE secondary lymphedema        Objective Measurements:  Objective Measure: girth  Details: LLE>RLE by 9.3%     Outcome Measures (most recent score):  Lymphedema Life Impact Scale (score range 0-72). A higher score indicates greater impairment.: 37    Goals:  Goal Identifier stg   Goal Description pt to have around the clock tolerance to BLE GCB for edema reduction response   Target Date 22   Date Met      Progress (detail required for progress note):       Goal Identifier ltg   Goal Description once appropriate, pt and/or family to be independent with donning, doffing and care of compression garments for longterm edema management for maintenance   Target Date 22   Date Met      Progress (detail required for progress note):       Goal Identifier ltg   Goal Description pt to be independent with longterm edema management via HEP, elevation, skin cares and compression garment wear/use   Target Date 22   Date Met      Progress (detail required for progress note):       Goal Identifier ltg   Goal Description pt to have at least 5 point improvement on LLIS due to decreased edema and asssociated symptoms in BLEs   Target Date 22   Date Met      Progress (detail required for progress note):       Plan:  Discharge from therapy. Patient only came to one appt on 22 and didn't return or schedule additional appts. Pt will be discharged.     Discharge:    Reason for Discharge: Patient has failed to schedule further appointments.    Equipment Issued: none    Discharge Plan: Follow-up with PCP as needed

## 2022-03-08 ENCOUNTER — HOSPITAL ENCOUNTER (OUTPATIENT)
Dept: ULTRASOUND IMAGING | Facility: CLINIC | Age: 57
Discharge: HOME | End: 2022-03-08
Attending: INTERNAL MEDICINE | Admitting: INTERNAL MEDICINE
Payer: COMMERCIAL

## 2022-03-08 DIAGNOSIS — K70.30 ALCOHOLIC CIRRHOSIS, UNSPECIFIED WHETHER ASCITES PRESENT (H): ICD-10-CM

## 2022-03-08 PROCEDURE — 76705 ECHO EXAM OF ABDOMEN: CPT

## 2022-03-18 ENCOUNTER — HOSPITAL ENCOUNTER (OUTPATIENT)
Dept: ULTRASOUND IMAGING | Facility: CLINIC | Age: 57
Discharge: HOME | End: 2022-03-18
Attending: INTERNAL MEDICINE | Admitting: INTERNAL MEDICINE
Payer: COMMERCIAL

## 2022-03-18 ENCOUNTER — LAB (OUTPATIENT)
Dept: LAB | Facility: CLINIC | Age: 57
End: 2022-03-18
Payer: COMMERCIAL

## 2022-03-18 DIAGNOSIS — R18.8 OTHER ASCITES: ICD-10-CM

## 2022-03-18 DIAGNOSIS — K70.30 ALCOHOLIC CIRRHOSIS OF LIVER (H): ICD-10-CM

## 2022-03-18 LAB
ANION GAP SERPL CALCULATED.3IONS-SCNC: 7 MMOL/L (ref 3–14)
BASOPHILS # BLD AUTO: 0 10E3/UL (ref 0–0.2)
BASOPHILS NFR BLD AUTO: 1 %
BILIRUB SERPL-MCNC: 2.2 MG/DL (ref 0.2–1.3)
BUN SERPL-MCNC: 13 MG/DL (ref 7–30)
CALCIUM SERPL-MCNC: 9.1 MG/DL (ref 8.5–10.1)
CHLORIDE BLD-SCNC: 104 MMOL/L (ref 94–109)
CO2 SERPL-SCNC: 24 MMOL/L (ref 20–32)
CREAT SERPL-MCNC: 1.31 MG/DL (ref 0.66–1.25)
EOSINOPHIL # BLD AUTO: 0.1 10E3/UL (ref 0–0.7)
EOSINOPHIL NFR BLD AUTO: 2 %
ERYTHROCYTE [DISTWIDTH] IN BLOOD BY AUTOMATED COUNT: 15.1 % (ref 10–15)
GFR SERPL CREATININE-BSD FRML MDRD: 65 ML/MIN/1.73M2
GLUCOSE BLD-MCNC: 103 MG/DL (ref 70–99)
HCT VFR BLD AUTO: 28.7 % (ref 40–53)
HGB BLD-MCNC: 9.5 G/DL (ref 13.3–17.7)
INR PPP: 1.54 (ref 0.85–1.15)
LYMPHOCYTES # BLD AUTO: 1.6 10E3/UL (ref 0.8–5.3)
LYMPHOCYTES NFR BLD AUTO: 29 %
MCH RBC QN AUTO: 32.3 PG (ref 26.5–33)
MCHC RBC AUTO-ENTMCNC: 33.1 G/DL (ref 31.5–36.5)
MCV RBC AUTO: 98 FL (ref 78–100)
MONOCYTES # BLD AUTO: 0.8 10E3/UL (ref 0–1.3)
MONOCYTES NFR BLD AUTO: 14 %
NEUTROPHILS # BLD AUTO: 3 10E3/UL (ref 1.6–8.3)
NEUTROPHILS NFR BLD AUTO: 55 %
PLATELET # BLD AUTO: 146 10E3/UL (ref 150–450)
POTASSIUM BLD-SCNC: 3.6 MMOL/L (ref 3.4–5.3)
RBC # BLD AUTO: 2.94 10E6/UL (ref 4.4–5.9)
SODIUM SERPL-SCNC: 135 MMOL/L (ref 133–144)
WBC # BLD AUTO: 5.6 10E3/UL (ref 4–11)

## 2022-03-18 PROCEDURE — 85610 PROTHROMBIN TIME: CPT

## 2022-03-18 PROCEDURE — 80048 BASIC METABOLIC PNL TOTAL CA: CPT

## 2022-03-18 PROCEDURE — 76705 ECHO EXAM OF ABDOMEN: CPT

## 2022-03-18 PROCEDURE — 82247 BILIRUBIN TOTAL: CPT

## 2022-03-18 PROCEDURE — 85025 COMPLETE CBC W/AUTO DIFF WBC: CPT

## 2022-03-18 PROCEDURE — 36415 COLL VENOUS BLD VENIPUNCTURE: CPT

## 2022-03-18 RX ORDER — LIDOCAINE 40 MG/G
CREAM TOPICAL
Status: CANCELLED | OUTPATIENT
Start: 2022-03-18

## 2022-04-21 ENCOUNTER — LAB (OUTPATIENT)
Dept: LAB | Facility: CLINIC | Age: 57
End: 2022-04-21
Payer: COMMERCIAL

## 2022-04-21 DIAGNOSIS — K70.30 ALCOHOLIC CIRRHOSIS OF LIVER (H): ICD-10-CM

## 2022-04-21 LAB
ANION GAP SERPL CALCULATED.3IONS-SCNC: 8 MMOL/L (ref 3–14)
BASOPHILS # BLD AUTO: 0 10E3/UL (ref 0–0.2)
BASOPHILS NFR BLD AUTO: 1 %
BILIRUB SERPL-MCNC: 2.5 MG/DL (ref 0.2–1.3)
BUN SERPL-MCNC: 16 MG/DL (ref 7–30)
CALCIUM SERPL-MCNC: 9.6 MG/DL (ref 8.5–10.1)
CHLORIDE BLD-SCNC: 102 MMOL/L (ref 94–109)
CO2 SERPL-SCNC: 24 MMOL/L (ref 20–32)
CREAT SERPL-MCNC: 1.32 MG/DL (ref 0.66–1.25)
EOSINOPHIL # BLD AUTO: 0.2 10E3/UL (ref 0–0.7)
EOSINOPHIL NFR BLD AUTO: 3 %
ERYTHROCYTE [DISTWIDTH] IN BLOOD BY AUTOMATED COUNT: 15.3 % (ref 10–15)
GFR SERPL CREATININE-BSD FRML MDRD: 64 ML/MIN/1.73M2
GLUCOSE BLD-MCNC: 116 MG/DL (ref 70–99)
HCT VFR BLD AUTO: 32.4 % (ref 40–53)
HGB BLD-MCNC: 10.6 G/DL (ref 13.3–17.7)
INR PPP: 1.33 (ref 0.85–1.15)
LYMPHOCYTES # BLD AUTO: 1.8 10E3/UL (ref 0.8–5.3)
LYMPHOCYTES NFR BLD AUTO: 29 %
MCH RBC QN AUTO: 32.2 PG (ref 26.5–33)
MCHC RBC AUTO-ENTMCNC: 32.7 G/DL (ref 31.5–36.5)
MCV RBC AUTO: 99 FL (ref 78–100)
MONOCYTES # BLD AUTO: 0.7 10E3/UL (ref 0–1.3)
MONOCYTES NFR BLD AUTO: 12 %
NEUTROPHILS # BLD AUTO: 3.4 10E3/UL (ref 1.6–8.3)
NEUTROPHILS NFR BLD AUTO: 56 %
PLATELET # BLD AUTO: 142 10E3/UL (ref 150–450)
POTASSIUM BLD-SCNC: 4.1 MMOL/L (ref 3.4–5.3)
RBC # BLD AUTO: 3.29 10E6/UL (ref 4.4–5.9)
SODIUM SERPL-SCNC: 134 MMOL/L (ref 133–144)
WBC # BLD AUTO: 6 10E3/UL (ref 4–11)

## 2022-04-21 PROCEDURE — 82247 BILIRUBIN TOTAL: CPT

## 2022-04-21 PROCEDURE — 85025 COMPLETE CBC W/AUTO DIFF WBC: CPT

## 2022-04-21 PROCEDURE — 85610 PROTHROMBIN TIME: CPT

## 2022-04-21 PROCEDURE — 36415 COLL VENOUS BLD VENIPUNCTURE: CPT

## 2022-04-21 PROCEDURE — 80048 BASIC METABOLIC PNL TOTAL CA: CPT

## 2022-04-21 NOTE — PATIENT INSTRUCTIONS
Procedure(s):  COLONOSCOPY/POLYPECTOMY/ ARGON PLASMA COAGULATOR. MAC    Anesthesia Post Evaluation      Multimodal analgesia: multimodal analgesia used between 6 hours prior to anesthesia start to PACU discharge  Patient location during evaluation: PACU  Patient participation: complete - patient participated  Level of consciousness: sleepy but conscious  Pain management: adequate  Airway patency: patent  Anesthetic complications: no  Cardiovascular status: acceptable  Respiratory status: acceptable  Hydration status: acceptable  Post anesthesia nausea and vomiting:  controlled  Final Post Anesthesia Temperature Assessment:  Normothermia (36.0-37.5 degrees C)      INITIAL Post-op Vital signs:   Vitals Value Taken Time   /60 04/21/22 0930   Temp 36.1 °C (96.9 °F) 04/21/22 0930   Pulse 60 04/21/22 0935   Resp 16 04/21/22 0935   SpO2 100 % 04/21/22 0935   Vitals shown include unvalidated device data. Schedule ultrasound.  To schedule this, call [phone].    You will be contacted in 1-2 days for results of your lab tests.    Increase omeprazole to 40 mg daily for 30 days.    Observe for improvement of abdominal symptoms.    You will be contacted in 1-2 business days to get a schedule for the behavior therapist for evaluation and treatment.  If additional underlying diagnosis is arrived at, further treatment may be recommended.      Thank you for choosing [facility].  You may be receiving an email and/or telephone survey request from [facility] Customer Experience regarding your visit today.  Please take a few minutes to respond to the survey to let us know how we are doing.      If you have questions or concerns, please contact us via [email or web address] or you can contact your care team at [phone].    Our Clinic hours are:  Monday 6:40 am  to 7:00 pm  Tuesday -Friday 6:40 am to 5:00 pm    The [place] outpatient lab hours are:  Monday - Friday 6:10 am to 4:45 pm  Saturdays 7:00 am to 11:00 am  Appointments are required, call [phone]    If you have clinical questions after hours or would like to schedule an appointment,  call the clinic at [phone].    Patient Education     Understanding Functional Dyspepsia    Dyspepsia is a set of symptoms in the upper belly (abdomen) that are linked to digestion. You may feel full too quickly after eating, and have pain or a burning feeling. Or you may have other problems. In some cases, dyspepsia is caused by an infection or physical problem that can be treated. But functional dyspepsia isn t caused by a disease. The symptoms are long-term (chronic). You ll need to learn ways to manage your symptoms over time. This may include taking medicines. It may also mean making changes to your diet and managing your mental health.  How to say it  [id number]   What causes functional dyspepsia?  Experts are still learning what may cause functional dyspepsia. The symptoms  are likely from a digestive tract that is very sensitive to certain things. These may include stress and some foods and drinks. In some cases, the symptoms may start after an infection with bacteria, a virus, or parasites.  Symptoms of functional dyspepsia  Symptoms have lasted for 3 months or more and can include:    Feeling full too quickly    Burping a lot    A burning feeling in the middle of your chest    Pain that doesn t get better after a bowel movement or passing gas    Upset stomach (nausea) or vomiting after eating    Feeling bloated    Loss of appetite  You may also have symptoms of irritable bowel syndrome (IBS). These can include ongoing constipation or diarrhea.  Treatment for functional dyspepsia  Your healthcare provider may prescribe a medicine to help ease your symptoms. You may take one or more of these:    Medicine to reduce stomach acid. You may take an H2-receptor antagonist. Or you may take a proton pump inhibitor (PPI). These medicines lower the amount of acid your stomach makes.    Medicine to increase digestive movement. This is also called a motility medicine. You may be given metoclopramide.    Antidepressant or antianxiety medicine. Some of these types of medicines may help to reduce symptoms.    Medicine to treat a stomach bacteria. If tests showyou have a stomach bacteria, you will be prescribed antibiotics.  Living withfunctional dyspepsia  To manage your condition over time, you will also need to:    Change your diet. Caffeine, alcohol, and foods that are fatty or spicy can cause symptoms in some people. It may help to keep a diary of when your symptoms occur and what you were eating or drinking. This can help you find out what foods and drinks to avoid.    Focus on your mental health. Anxiety, depression, and stress can also cause symptoms in some people. Learning ways to manage your mental health can help reduce symptoms. This may include working with a counselor.  When to call  your healthcare provider  Call your healthcare provider right away if you have any of these:    Symptoms that don t get better, or get worse    New symptoms    Vomiting that doesn t stop    Vomiting blood    Bloody stool or black tarry stool    Unexplained weight loss   Date Last Reviewed: 6/1/2016 2000-2018 [employer]. [address] [place] [id number]. All rights reserved. This information is not intended as a substitute for professional medical care. Always follow your healthcare professional's instructions.           Patient Education     Adjustment Disorder  Life changes--work, family, parents, children--each can cause a great deal of stress in life. An adjustment disorder means you have trouble dealing with this change and stress. This problem can have serious results. You may feel helpless, depressed, make bad decisions, or even feel like you want to hurt yourself.  Adjustment disorder can cause anxiety or depression. It is triggered by daily stresses such as:    Death of a loved one    Divorce    Marriage    General life changes such as changing or leaving a job    Moving    Illness or other health issue for you or a family member    Sex    Money     Symptoms may include:    Sadness or crying    Anxiety    Insomnia    Poor concentration    Trouble doing simple things    New problems at work or with family or friends    Loss of self-esteem    Sense of hopelessness    Feeling trapped or cut off from others  With this condition, it is common to feel sad, guilty, hopeless, and restless. These feelings may continue for weeks or months. It can be helpful to identify what is causing the additional stress and take steps to get extra support. If new stressful events do not happen, it is likely that you will gradually start feeling better.  Home care    If you have been given a prescription for medicine, take it as directed.    It helps to talk about your feelings and thoughts with family or  friends who understand and support you.  Follow-up care  Follow up with your healthcare provider, or therapist as advised. Let them know if this condition does not improve or gets worse.  When to seek medical advice  Call your healthcare provider right away if any of these happen:    Worsening depression or anxiety    Feeling out of control    Thoughts of harming yourself or another    Being unable to care for yourself  Date Last Reviewed: 10/1/2017    3549-8193 [employer]. [address] [place] [address], [place] [id number]. All rights reserved. This information is not intended as a substitute for professional medical care. Always follow your healthcare professional's instructions.

## 2022-05-29 ENCOUNTER — LAB (OUTPATIENT)
Dept: FAMILY MEDICINE | Facility: CLINIC | Age: 57
End: 2022-05-29
Attending: FAMILY MEDICINE
Payer: COMMERCIAL

## 2022-06-23 ENCOUNTER — LAB (OUTPATIENT)
Dept: LAB | Facility: CLINIC | Age: 57
End: 2022-06-23
Payer: COMMERCIAL

## 2022-06-23 DIAGNOSIS — K70.30 ALCOHOLIC CIRRHOSIS OF LIVER (H): ICD-10-CM

## 2022-06-23 LAB
ANION GAP SERPL CALCULATED.3IONS-SCNC: 7 MMOL/L (ref 3–14)
BASOPHILS # BLD AUTO: 0 10E3/UL (ref 0–0.2)
BASOPHILS NFR BLD AUTO: 1 %
BILIRUB SERPL-MCNC: 2.2 MG/DL (ref 0.2–1.3)
BUN SERPL-MCNC: 19 MG/DL (ref 7–30)
CALCIUM SERPL-MCNC: 9.4 MG/DL (ref 8.5–10.1)
CHLORIDE BLD-SCNC: 105 MMOL/L (ref 94–109)
CO2 SERPL-SCNC: 22 MMOL/L (ref 20–32)
CREAT SERPL-MCNC: 1.32 MG/DL (ref 0.66–1.25)
EOSINOPHIL # BLD AUTO: 0.1 10E3/UL (ref 0–0.7)
EOSINOPHIL NFR BLD AUTO: 2 %
ERYTHROCYTE [DISTWIDTH] IN BLOOD BY AUTOMATED COUNT: 14.9 % (ref 10–15)
GFR SERPL CREATININE-BSD FRML MDRD: 64 ML/MIN/1.73M2
GLUCOSE BLD-MCNC: 110 MG/DL (ref 70–99)
HCT VFR BLD AUTO: 34 % (ref 40–53)
HGB BLD-MCNC: 11.1 G/DL (ref 13.3–17.7)
INR PPP: 1.39 (ref 0.85–1.15)
LYMPHOCYTES # BLD AUTO: 1.8 10E3/UL (ref 0.8–5.3)
LYMPHOCYTES NFR BLD AUTO: 30 %
MCH RBC QN AUTO: 32.7 PG (ref 26.5–33)
MCHC RBC AUTO-ENTMCNC: 32.6 G/DL (ref 31.5–36.5)
MCV RBC AUTO: 100 FL (ref 78–100)
MONOCYTES # BLD AUTO: 0.9 10E3/UL (ref 0–1.3)
MONOCYTES NFR BLD AUTO: 15 %
NEUTROPHILS # BLD AUTO: 3.1 10E3/UL (ref 1.6–8.3)
NEUTROPHILS NFR BLD AUTO: 53 %
PLATELET # BLD AUTO: 146 10E3/UL (ref 150–450)
POTASSIUM BLD-SCNC: 3.9 MMOL/L (ref 3.4–5.3)
RBC # BLD AUTO: 3.39 10E6/UL (ref 4.4–5.9)
SODIUM SERPL-SCNC: 134 MMOL/L (ref 133–144)
WBC # BLD AUTO: 5.9 10E3/UL (ref 4–11)

## 2022-06-23 PROCEDURE — 82247 BILIRUBIN TOTAL: CPT

## 2022-06-23 PROCEDURE — 85610 PROTHROMBIN TIME: CPT

## 2022-06-23 PROCEDURE — 80048 BASIC METABOLIC PNL TOTAL CA: CPT

## 2022-06-23 PROCEDURE — 36415 COLL VENOUS BLD VENIPUNCTURE: CPT

## 2022-06-23 PROCEDURE — 85025 COMPLETE CBC W/AUTO DIFF WBC: CPT

## 2022-09-26 ENCOUNTER — OFFICE VISIT (OUTPATIENT)
Dept: FAMILY MEDICINE | Facility: CLINIC | Age: 57
End: 2022-09-26
Payer: COMMERCIAL

## 2022-09-26 VITALS
HEART RATE: 82 BPM | WEIGHT: 255 LBS | DIASTOLIC BLOOD PRESSURE: 60 MMHG | HEIGHT: 73 IN | RESPIRATION RATE: 16 BRPM | OXYGEN SATURATION: 100 % | BODY MASS INDEX: 33.8 KG/M2 | SYSTOLIC BLOOD PRESSURE: 110 MMHG | TEMPERATURE: 97.9 F

## 2022-09-26 DIAGNOSIS — Z00.00 ROUTINE GENERAL MEDICAL EXAMINATION AT A HEALTH CARE FACILITY: Primary | ICD-10-CM

## 2022-09-26 DIAGNOSIS — R20.0 NUMBNESS AND TINGLING OF BOTH FEET: ICD-10-CM

## 2022-09-26 DIAGNOSIS — N50.89 SCROTAL SWELLING: ICD-10-CM

## 2022-09-26 DIAGNOSIS — K70.30 ALCOHOLIC CIRRHOSIS, UNSPECIFIED WHETHER ASCITES PRESENT (H): ICD-10-CM

## 2022-09-26 DIAGNOSIS — Z13.220 SCREENING FOR HYPERLIPIDEMIA: ICD-10-CM

## 2022-09-26 DIAGNOSIS — R53.83 OTHER FATIGUE: ICD-10-CM

## 2022-09-26 DIAGNOSIS — Z11.4 SCREENING FOR HIV (HUMAN IMMUNODEFICIENCY VIRUS): ICD-10-CM

## 2022-09-26 DIAGNOSIS — G89.29 OTHER CHRONIC PAIN: ICD-10-CM

## 2022-09-26 DIAGNOSIS — R20.2 NUMBNESS AND TINGLING OF BOTH FEET: ICD-10-CM

## 2022-09-26 DIAGNOSIS — Z12.5 ENCOUNTER FOR SCREENING FOR MALIGNANT NEOPLASM OF PROSTATE: ICD-10-CM

## 2022-09-26 LAB
ALBUMIN SERPL BCG-MCNC: 3.6 G/DL (ref 3.5–5.2)
ALP SERPL-CCNC: 102 U/L (ref 40–129)
ALT SERPL W P-5'-P-CCNC: 16 U/L (ref 10–50)
ANION GAP SERPL CALCULATED.3IONS-SCNC: 10 MMOL/L (ref 7–15)
AST SERPL W P-5'-P-CCNC: 37 U/L (ref 10–50)
BILIRUB SERPL-MCNC: 2.3 MG/DL
BUN SERPL-MCNC: 14.8 MG/DL (ref 6–20)
CALCIUM SERPL-MCNC: 9.8 MG/DL (ref 8.6–10)
CHLORIDE SERPL-SCNC: 101 MMOL/L (ref 98–107)
CHOLEST SERPL-MCNC: 275 MG/DL
CREAT SERPL-MCNC: 1.33 MG/DL (ref 0.67–1.17)
DEPRECATED HCO3 PLAS-SCNC: 24 MMOL/L (ref 22–29)
ERYTHROCYTE [DISTWIDTH] IN BLOOD BY AUTOMATED COUNT: 14.1 % (ref 10–15)
FERRITIN SERPL-MCNC: 33 NG/ML (ref 31–409)
GFR SERPL CREATININE-BSD FRML MDRD: 63 ML/MIN/1.73M2
GLUCOSE SERPL-MCNC: 123 MG/DL (ref 70–99)
HCT VFR BLD AUTO: 33 % (ref 40–53)
HDLC SERPL-MCNC: 51 MG/DL
HGB BLD-MCNC: 10.5 G/DL (ref 13.3–17.7)
HIV 1+2 AB+HIV1 P24 AG SERPL QL IA: NONREACTIVE
IRON BINDING CAPACITY (ROCHE): 326 UG/DL (ref 240–430)
IRON SATN MFR SERPL: 29 % (ref 15–46)
IRON SERPL-MCNC: 95 UG/DL (ref 61–157)
LDLC SERPL CALC-MCNC: 202 MG/DL
MCH RBC QN AUTO: 30.9 PG (ref 26.5–33)
MCHC RBC AUTO-ENTMCNC: 31.8 G/DL (ref 31.5–36.5)
MCV RBC AUTO: 97 FL (ref 78–100)
NONHDLC SERPL-MCNC: 224 MG/DL
PLATELET # BLD AUTO: 146 10E3/UL (ref 150–450)
POTASSIUM SERPL-SCNC: 4.1 MMOL/L (ref 3.4–5.3)
PROT SERPL-MCNC: 7.9 G/DL (ref 6.4–8.3)
PSA SERPL-MCNC: 1.45 NG/ML (ref 0–3.5)
RBC # BLD AUTO: 3.4 10E6/UL (ref 4.4–5.9)
SODIUM SERPL-SCNC: 135 MMOL/L (ref 136–145)
TRIGL SERPL-MCNC: 112 MG/DL
TSH SERPL DL<=0.005 MIU/L-ACNC: 3.88 UIU/ML (ref 0.3–4.2)
VIT B12 SERPL-MCNC: 1111 PG/ML (ref 232–1245)
WBC # BLD AUTO: 4.8 10E3/UL (ref 4–11)

## 2022-09-26 PROCEDURE — 82607 VITAMIN B-12: CPT | Performed by: FAMILY MEDICINE

## 2022-09-26 PROCEDURE — 90471 IMMUNIZATION ADMIN: CPT | Performed by: FAMILY MEDICINE

## 2022-09-26 PROCEDURE — 83540 ASSAY OF IRON: CPT | Performed by: FAMILY MEDICINE

## 2022-09-26 PROCEDURE — 91312 COVID-19,PF,PFIZER BOOSTER BIVALENT: CPT | Performed by: FAMILY MEDICINE

## 2022-09-26 PROCEDURE — 36415 COLL VENOUS BLD VENIPUNCTURE: CPT | Performed by: FAMILY MEDICINE

## 2022-09-26 PROCEDURE — G0103 PSA SCREENING: HCPCS | Performed by: FAMILY MEDICINE

## 2022-09-26 PROCEDURE — 90682 RIV4 VACC RECOMBINANT DNA IM: CPT | Performed by: FAMILY MEDICINE

## 2022-09-26 PROCEDURE — 99396 PREV VISIT EST AGE 40-64: CPT | Mod: 25 | Performed by: FAMILY MEDICINE

## 2022-09-26 PROCEDURE — 85027 COMPLETE CBC AUTOMATED: CPT | Performed by: FAMILY MEDICINE

## 2022-09-26 PROCEDURE — 82728 ASSAY OF FERRITIN: CPT | Performed by: FAMILY MEDICINE

## 2022-09-26 PROCEDURE — 83550 IRON BINDING TEST: CPT | Performed by: FAMILY MEDICINE

## 2022-09-26 PROCEDURE — 80053 COMPREHEN METABOLIC PANEL: CPT | Performed by: FAMILY MEDICINE

## 2022-09-26 PROCEDURE — 80061 LIPID PANEL: CPT | Performed by: FAMILY MEDICINE

## 2022-09-26 PROCEDURE — 84443 ASSAY THYROID STIM HORMONE: CPT | Performed by: FAMILY MEDICINE

## 2022-09-26 PROCEDURE — 0124A COVID-19,PF,PFIZER BOOSTER BIVALENT: CPT | Performed by: FAMILY MEDICINE

## 2022-09-26 PROCEDURE — 99214 OFFICE O/P EST MOD 30 MIN: CPT | Mod: 25 | Performed by: FAMILY MEDICINE

## 2022-09-26 PROCEDURE — 87389 HIV-1 AG W/HIV-1&-2 AB AG IA: CPT | Performed by: FAMILY MEDICINE

## 2022-09-26 PROCEDURE — 86618 LYME DISEASE ANTIBODY: CPT | Performed by: FAMILY MEDICINE

## 2022-09-26 RX ORDER — TRAZODONE HYDROCHLORIDE 50 MG/1
100 TABLET, FILM COATED ORAL
COMMUNITY
Start: 2022-09-12

## 2022-09-26 ASSESSMENT — ENCOUNTER SYMPTOMS
SHORTNESS OF BREATH: 0
ABDOMINAL PAIN: 0
HEADACHES: 0
WEAKNESS: 1
SORE THROAT: 0
DIZZINESS: 0
COUGH: 0
HEMATOCHEZIA: 0
HEMATURIA: 0
ARTHRALGIAS: 0
PARESTHESIAS: 0
DYSURIA: 0
PALPITATIONS: 0
DIARRHEA: 0
EYE PAIN: 0
FREQUENCY: 0
JOINT SWELLING: 0
FEVER: 0
CONSTIPATION: 0
NERVOUS/ANXIOUS: 0
NAUSEA: 0
HEARTBURN: 0
MYALGIAS: 1
CHILLS: 0

## 2022-09-26 ASSESSMENT — PATIENT HEALTH QUESTIONNAIRE - PHQ9
SUM OF ALL RESPONSES TO PHQ QUESTIONS 1-9: 3
10. IF YOU CHECKED OFF ANY PROBLEMS, HOW DIFFICULT HAVE THESE PROBLEMS MADE IT FOR YOU TO DO YOUR WORK, TAKE CARE OF THINGS AT HOME, OR GET ALONG WITH OTHER PEOPLE: SOMEWHAT DIFFICULT
SUM OF ALL RESPONSES TO PHQ QUESTIONS 1-9: 3

## 2022-09-26 ASSESSMENT — PAIN SCALES - GENERAL: PAINLEVEL: MILD PAIN (2)

## 2022-09-26 NOTE — PROGRESS NOTES
SUBJECTIVE:   CC: Oscar is an 57 year old who presents for preventative health visit.       Patient has been advised of split billing requirements and indicates understanding: Yes  Healthy Habits:     Getting at least 3 servings of Calcium per day:  NO    Bi-annual eye exam:  Yes    Dental care twice a year:  NO    Sleep apnea or symptoms of sleep apnea:  Daytime drowsiness    Diet:  Regular (no restrictions)    Frequency of exercise:  1 day/week    Duration of exercise:  15-30 minutes    Taking medications regularly:  Yes    Medication side effects:  None    PHQ-2 Total Score: 1    Additional concerns today:  No       57 year old male who presents to clinic for annual visit. Wishes to establish care.       Cholesterol: screen today   Diabetes: screen today fasting  Colon Cancer: Colonoscopy 2/8/2019, multiple tubular adenoma's, repeat in 5 years 2024.   Prostate: screen today   HIV screen: screen today         Cirrhosis  Follows with GI at [facility].   On lasix, spironolactone, lactulose, rifaximin.   Abstinent from alcohol for the last 11 months   Has been out of work for the past year.   Physically very weak  Has brain fog that ebs and flows.   Has upper GI endoscopy in October.       Testicle discomfort.   Occasional testicle discomfort, if touched or adjusted will have some testicle pain.   Feels like the right side of his scrotum is guerrero in size.   No urinary symptoms.     Neuropathy in feet.   Feels like socks are still on at night    Has decreased sensation in the feet.   Reports having numbness in the feet.   Pain will come and go.   Reports having decreased strength in the lower legs.   Decreased strength throughout.     Chronic fatigue  Chronic pain of shoulders, upper back, lower back.   Reports after sitting in the car for 20 minutes will have sciatic like pain on the right side.   Reports having multiple ticks on him in the past and is wondering about possible Lyme's.         Chief Complaint    Patient presents with     Physical     Establish Care     Neurologic Problem     Lyme testing, neuropathy in feet, low back pain, liver disease. MRI on back needed per [place] doctor.         Today's PHQ-2 Score:   PHQ-2 ( 1999 [employer]) 9/26/2022   Q1: Little interest or pleasure in doing things 1   Q2: Feeling down, depressed or hopeless 0   PHQ-2 Score 1   PHQ-2 Total Score (12-17 Years)- Positive if 3 or more points; Administer PHQ-A if positive -   Q1: Little interest or pleasure in doing things Several days   Q2: Feeling down, depressed or hopeless Not at all   PHQ-2 Score 1       Abuse: Current or Past(Physical, Sexual or Emotional)- No  Do you feel safe in your environment? Yes    Have you ever done Advance Care Planning? (For example, a Health Directive, POLST, or a discussion with a medical provider or your loved ones about your wishes): No, advance care planning information given to patient to review.  Patient declined advance care planning discussion at this time.    Social History     Tobacco Use     Smoking status: Never Smoker     Smokeless tobacco: Never Used   Substance Use Topics     Alcohol use: Yes     Alcohol/week: 6.0 standard drinks     Types: 6 Shots of liquor per week     Comment: quit 2 weeks ago     If you drink alcohol do you typically have >3 drinks per day or >7 drinks per week? No    Alcohol Use 9/26/2022   Prescreen: >3 drinks/day or >7 drinks/week? Not Applicable       Last PSA: No results found for: PSA    Reviewed orders with patient. Reviewed health maintenance and updated orders accordingly - Yes      Reviewed and updated as needed this visit by clinical staff   Tobacco  Allergies  Meds  Problems  Med Hx  Surg Hx  Fam Hx  Soc   Hx          Reviewed and updated as needed this visit by Provider                       Review of Systems   Constitutional: Negative for chills and fever.   HENT: Negative for congestion, ear pain, hearing loss and sore throat.    Eyes: Negative for  "pain and visual disturbance.   Respiratory: Negative for cough and shortness of breath.    Cardiovascular: Positive for peripheral edema. Negative for chest pain and palpitations.   Gastrointestinal: Negative for abdominal pain, constipation, diarrhea, heartburn, hematochezia and nausea.   Genitourinary: Negative for dysuria, frequency, genital sores, hematuria, impotence, penile discharge and urgency.   Musculoskeletal: Positive for myalgias. Negative for arthralgias and joint swelling.   Skin: Negative for rash.   Neurological: Positive for weakness. Negative for dizziness, headaches and paresthesias.   Psychiatric/Behavioral: Positive for mood changes. The patient is not nervous/anxious.        OBJECTIVE:   /60 (BP Location: Left arm, Patient Position: Chair, Cuff Size: Adult Regular)   Pulse 82   Temp 97.9  F (36.6  C) (Tympanic)   Resp 16   Ht 1.865 m (6' 1.43\")   Wt 115.7 kg (255 lb)   SpO2 100%   BMI 33.25 kg/m      Physical Exam  GENERAL: healthy, no distress.   EYES: Eyes grossly normal to inspection, PERRL and conjunctivae and sclerae normal  NECK: no adenopathy, no asymmetry, masses, or scars and thyroid normal to palpation  RESP: lungs clear to auscultation - no rales, rhonchi or wheezes  CV: regular rate and rhythm, normal S1 S2, no S3 or S4, no murmur, click or rub.  ABDOMEN: soft, nontender, no hepatosplenomegaly, no masses and bowel sounds normal  : testes descended bilaterally. Scrotal fullness on the right side. Concerning for hernia.   MS: no gross musculoskeletal defects noted, no edema  SKIN: no suspicious lesions or rashes  NEURO: slow gait. Decreased strength throughout.     ASSESSMENT/PLAN:   Oscar was seen today for physical, establish care and neurologic problem.    Diagnoses and all orders for this visit:    Routine general medical examination at a health care facility  Cholesterol: screen today   Diabetes: screen today fasting  Colon Cancer: Colonoscopy 2/8/2019, multiple " tubular adenoma's, repeat in 5 years 2024.   Prostate: screen today   HIV screen: screen today     Screening for HIV (human immunodeficiency virus)  -     HIV Antigen Antibody Combo    Screening for hyperlipidemia  -     Lipid panel reflex to direct LDL Fasting    Encounter for screening for malignant neoplasm of prostate  -     PSA, screen    Other fatigue  -- Chronic fatigue. Will check baseline labs. Likely related to deconditioning as minimal activity at this time. Likely related to his cirrhosis as well.  Will refer to physical therapy for home exercise and conditioning regimen.   -     Lyme Disease Total Abs Bld with Reflex to Confirm CLIA  -     CBC with platelets  -     Comprehensive metabolic panel (BMP + Alb, Alk Phos, ALT, AST, Total. Bili, TP)  -     Ferritin  -     Iron and iron binding capacity  -     TSH with free T4 reflex  -     Physical Therapy Referral; Future    Alcoholic cirrhosis, unspecified whether ascites present (H)  Follows with [facility]. On lasix, spironolactone, lactulose, rifaximin.       Numbness and tingling of both feet  Likely related to prior alcohol use. Will check thyroid and B12. Refer to Neurology for EMG and further assessment of neuropathy like symptoms.   -     Adult Neurology  Referral; Future  -     Vitamin B12    Other chronic pain  Chronic pain in shoulders, upper back, lower back, and deconditioned.   -     Physical Therapy Referral; Future    Scrotal swelling  Exam concerning for inguinal hernia. Will order US.   -     US Hernia Evaluation; Future    Other orders  -     REVIEW OF HEALTH MAINTENANCE PROTOCOL ORDERS  -     INFLUENZA QUAD, RECOMBINANT, P-FREE (RIV4) (FLUBLOK) AGE 50-64 [BVL234]  -     COVID-19,PF,PFIZER BOOSTER BIVALENT (12+YRS)      The risks, benefits and treatment options of prescribed medications or other treatments have been discussed with the patient. The patient verbalized their understanding and should call or follow up if no improvement or  "if they develop further problems.      Follow up in 3-4 weeks for re-evaluation.     Patient has been advised of split billing requirements and indicates understanding: Yes    COUNSELING:   Reviewed preventive health counseling, as reflected in patient instructions       Regular exercise       Healthy diet/nutrition       HIV screeninx in teen years, 1x in adult years, and at intervals if high risk       Colorectal cancer screening       Prostate cancer screening    Estimated body mass index is 33.25 kg/m  as calculated from the following:    Height as of this encounter: 1.865 m (6' 1.43\").    Weight as of this encounter: 115.7 kg (255 lb).     Weight management plan: Discussed healthy diet and exercise guidelines    He reports that he has never smoked. He has never used smokeless tobacco.      Counseling Resources:  ATP IV Guidelines  Pooled Cohorts Equation Calculator  FRAX Risk Assessment  ICSI Preventive Guidelines  Dietary Guidelines for Americans, 2010  USDA's MyPlate  ASA Prophylaxis  Lung CA Screening    Chico Wong DO  [facility]  "

## 2022-09-26 NOTE — PATIENT INSTRUCTIONS
Check lab work today.     Follow up with Neurology regarding the feet sensation.     Follow up with physical therapy for the fatigue and chronic pain, deconditioning.     Follow up in 3-4 weeks for recheck.     Schedule US of scrotum for hernia evaluation.             Preventive Health Recommendations  Male Ages 50 - 64    Yearly exam:             See your health care provider every year in order to  o   Review health changes.   o   Discuss preventive care.    o   Review your medicines if your doctor has prescribed any.   Have a cholesterol test every 5 years, or more frequently if you are at risk for high cholesterol/heart disease.   Have a diabetes test (fasting glucose) every three years. If you are at risk for diabetes, you should have this test more often.   Have a colonoscopy at age 50, or have a yearly FIT test (stool test). These exams will check for colon cancer.    Talk with your health care provider about whether or not a prostate cancer screening test (PSA) is right for you.  You should be tested each year for STDs (sexually transmitted diseases), if you re at risk.     Shots: Get a flu shot each year. Get a tetanus shot every 10 years.     Nutrition:  Eat at least 5 servings of fruits and vegetables daily.   Eat whole-grain bread, whole-wheat pasta and brown rice instead of white grains and rice.   Get adequate Calcium and Vitamin D.     Lifestyle  Exercise for at least 150 minutes a week (30 minutes a day, 5 days a week). This will help you control your weight and prevent disease.   Limit alcohol to one drink per day.   No smoking.   Wear sunscreen to prevent skin cancer.   See your dentist every six months for an exam and cleaning.   See your eye doctor every 1 to 2 years.

## 2022-09-27 DIAGNOSIS — D50.8 OTHER IRON DEFICIENCY ANEMIA: ICD-10-CM

## 2022-09-27 PROBLEM — D50.9 ANEMIA, IRON DEFICIENCY: Status: ACTIVE | Noted: 2022-09-27

## 2022-09-27 LAB — B BURGDOR IGG+IGM SER QL: 0.34

## 2022-09-27 RX ORDER — FERROUS SULFATE 325(65) MG
325 TABLET, DELAYED RELEASE (ENTERIC COATED) ORAL EVERY OTHER DAY
Qty: 45 TABLET | Refills: 3 | Status: SHIPPED | OUTPATIENT
Start: 2022-09-27 | End: 2023-10-31

## 2022-10-02 ENCOUNTER — HOSPITAL ENCOUNTER (OUTPATIENT)
Dept: ULTRASOUND IMAGING | Facility: CLINIC | Age: 57
Discharge: HOME | End: 2022-10-02
Attending: FAMILY MEDICINE | Admitting: FAMILY MEDICINE
Payer: COMMERCIAL

## 2022-10-02 DIAGNOSIS — N50.89 SCROTAL SWELLING: ICD-10-CM

## 2022-10-02 PROCEDURE — 76705 ECHO EXAM OF ABDOMEN: CPT

## 2022-10-12 ENCOUNTER — HOSPITAL ENCOUNTER (OUTPATIENT)
Dept: PHYSICAL THERAPY | Facility: CLINIC | Age: 57
Setting detail: THERAPIES SERIES
Discharge: HOME | End: 2022-10-12
Attending: FAMILY MEDICINE
Payer: COMMERCIAL

## 2022-10-12 DIAGNOSIS — R53.83 OTHER FATIGUE: ICD-10-CM

## 2022-10-12 DIAGNOSIS — G89.29 OTHER CHRONIC PAIN: ICD-10-CM

## 2022-10-12 PROCEDURE — 97162 PT EVAL MOD COMPLEX 30 MIN: CPT | Mod: GP | Performed by: PHYSICAL THERAPIST

## 2022-10-12 PROCEDURE — 97110 THERAPEUTIC EXERCISES: CPT | Mod: GP | Performed by: PHYSICAL THERAPIST

## 2022-10-12 NOTE — PROGRESS NOTES
10/12/22 1300   General Information   Type of Visit Initial OP Ortho PT Evaluation   Start of Care Date 10/12/22   Referring Physician Chico Higuera P, DO   Patient/Family Goals Statement decrease pain and strengthen   Orders Evaluate and Treat   Orders Comment deconditioned. CHronic pain shoulder, upper back, lower back, overall deconditioned from cirrhosis   Date of Order 09/26/22   Certification Required? No   Medical Diagnosis Other fatigue (R53.83)     Other chronic pain (G89.29)   Body Part(s)   Body Part(s) Lumbar Spine/SI   Presentation and Etiology   Pertinent history of current problem (include personal factors and/or comorbidities that impact the POC) Pt notes deconditioning at this time. He is having spinal pain at this time. He notes radiating pain into the R LE Sx with into the upper portion of the posterior thigh. Pt notes having discomfort when driving but not when sitting in a regular. No Hx of surgery. In hospital for about 2.5 wks ago with cirrhosis. Pt notes not being able to get out of a chair, get into a boat, and roll over in bed. He notes being able to ambulate for about 10 minutes.  B/l shoulder pain in the anterior portion with use of UEs to raise himself out of chairs, etc. Full body weakness and deconditioning. R anterior/groin pain.   Impairments A. Pain;C. Swelling;D. Decreased ROM;F. Decreased strength and endurance;G. Impaired balance;H. Impaired gait;K. Numbness;L. Tingling   Functional Limitations perform activities of daily living   Symptom Location LBP and shoulder pain   How/Where did it occur Other   Onset date of current episode/exacerbation 09/26/22   Chronicity Chronic   Pain rating (0-10 point scale) Best (/10);Worst (/10)   Best (/10) 1   Worst (/10) 6   Pain quality B. Dull;C. Aching   Frequency of pain/symptoms B. Intermittent   Pain/symptoms are: Worse during the day   Pain/symptoms exacerbated by A. Sitting;B. Walking;G. Certain positions;H. Overhead reach;I.  Bending;K. Home tasks;J. ADL;L. Work tasks   Pain/symptoms eased by A. Sitting;C. Rest;D. Nothing;E. Changing positions   Progression of symptoms since onset: Unchanged   Prior Level of Function   Functional Level Prior Comment pt is an ex-, who was active and performed many tasks with all mobility and function   Fall Risk Screen   Fall screen completed by PT   Have you fallen 2 or more times in the past year? Yes   Have you fallen and had an injury in the past year? No   Timed Up and Go score (seconds) 11.58 sec   Is patient a fall risk? No   Abuse Screen (yes response referral indicated)   Feels Unsafe at Home or Work/School unable to answer (comment required)  (wife present for the session)   Lumbar Spine/SI Objective Findings   Gait/Locomotion slow gait normally able to take larger steps but apprehensive with balance   Hamstring Flexibility fair b/l L tighter than R   Hip Flexor Flexibility tight   Quadricep Flexibility tight   Piriformis Flexibility tight   Flexion ROM 75% able to reach mid shin; UE support through the motion   Extension ROM 15% of motion; apprehensive for motion   Right Side Bending ROM reaches knee with stretch   Left Side Bending ROM reaches knee with stretch   Pelvic Screen - LLD; - sacral rotation noted today   Hip Screen R LE- hip flex to 90, posterior glide force and able to achieve 110;  R IR in 90/90- 0 degrees, 15 degrees in 45 degree hip flexion;  R ER in 90/90- 60 with posterior glide pressure otherwise 45 degrees;  L LE- flex- 110 degrees;  60 of ER in 90/90;  20 of IR in 90/90   Transversus Abdominus Strength (Christopher Leg Lowering-deg) 1/5   Hip Flexion (L2) Strength 3+/5 L, 3/5 R   Hip Abduction Strength 3+/5 L  3-/5 R   Hip Extension Strength pain in the R hip with motion and unable to complete   Knee Flexion Strength 4+/5   Knee Extension (L3) Strength 4+/5   SLR -   John Test +   Crossover SLR -   Lumbar/SI Special Tests Comments limited R hip posterior glide to the  area-> presents with joint pain discomfort   Segmental Mobility limited flexion mobility   Palpation TPR to deep hip rotators, piriformis, ES in low back;   Tightness to pec minor, ant delt, and biceps b/l   Slump Test -   Observation shoulder ROM- 90 degrees flex and bad;  unable to place hands on his hips;  ER in neutral- 50 degrees b/l;  weeakness noted in biceps and mid trap;  tightness to UT and pec minor leading to rounded shoulders and poor posture and weakness in upper back   Posture fwd flexion at the hips; rotated to the L; R slight toeing out;  rounded shoulders, anterior scapular tilt   Planned Therapy Interventions   Planned Therapy Interventions balance training;gait training;joint mobilization;manual therapy;neuromuscular re-education;ROM;stretching;strengthening   Planned Modality Interventions   Planned Modality Interventions Electrical stimulation;TENS;Traction;Ultrasound   Clinical Impression   Criteria for Skilled Therapeutic Interventions Met yes, treatment indicated   PT Diagnosis LBP, b/l shoulder pain, deconditioning   Influenced by the following impairments weakness, decreased ROM, muscle imbalance, abnormal posture   Functional limitations due to impairments walking, squatting, sitting, lifting/carrying   Clinical Presentation Evolving/Changing   Clinical Presentation Rationale chronic condition with occasional Sx radiating into the R LE; pt notes overall weakness to the area with functional changes   Clinical Decision Making (Complexity) Moderate complexity   Therapy Frequency 1 time/week   Predicted Duration of Therapy Intervention (days/wks) 10 weeks   Risk & Benefits of therapy have been explained Yes   Patient, Family & other staff in agreement with plan of care Yes   Clinical Impression Comments Pt shows restriction in his R hip in mobility with anterior translation of the femur head, possible stretching of the anterior capsule and hip impingement due to this. Decrease in Sx with  posterior glide to the area. Tightness b/l in the hips and low back with R > L and increasing anterior pressure in the joint. Pt shows increase in use of his UEs to support him when he is changing position and has anterior tightness in the shoulders limiting his mobility. Weakness set in after a 2.5 wk stay in the hospital after cirrhosis. Prognosis is good at this time as the pt is willing to participate and return to his previous level of function.   ORTHO GOALS   PT Ortho Eval Goals 1;2;3;4   Ortho Goal 1   Goal Identifier ST goal   Goal Description Pt will have 140 degrees of shoulder flex and abd to be able to reach and wash his hair in 4 weeks.   Target Date 11/09/22   Ortho Goal 2   Goal Identifier ST goal   Goal Description Pt will have 50% fo lumbar extension to be able to improve his mobility and stand upright with a greater gait stride in 4 weeks.   Target Date 11/09/22   Ortho Goal 3   Goal Identifier LT goal   Goal Description Pt will have a gross LE strength of 4+/5 to be able to tolerate ambulating in a period of time in 10 weeks.   Target Date 01/04/23   Ortho Goal 4   Goal Identifier LT goal   Goal Description Pt will be independent in home strengthening program for self management of Sx in 10 weeks.   Target Date 01/04/23   Total Evaluation Time   PT Eval, Moderate Complexity Minutes (01302) 35       Autumn Cantor, PT, DPT

## 2022-10-20 ENCOUNTER — TRANSFERRED RECORDS (OUTPATIENT)
Dept: HEALTH INFORMATION MANAGEMENT | Facility: CLINIC | Age: 57
End: 2022-10-20

## 2022-10-25 ENCOUNTER — TELEPHONE (OUTPATIENT)
Dept: FAMILY MEDICINE | Facility: CLINIC | Age: 57
End: 2022-10-25

## 2022-10-25 ENCOUNTER — LAB (OUTPATIENT)
Dept: LAB | Facility: CLINIC | Age: 57
End: 2022-10-25
Payer: COMMERCIAL

## 2022-10-25 DIAGNOSIS — K70.30 ALCOHOLIC CIRRHOSIS OF LIVER (H): ICD-10-CM

## 2022-10-25 LAB
ANION GAP SERPL CALCULATED.3IONS-SCNC: 14 MMOL/L (ref 7–15)
BASOPHILS # BLD AUTO: 0.1 10E3/UL (ref 0–0.2)
BASOPHILS NFR BLD AUTO: 1 %
BILIRUB SERPL-MCNC: 1.7 MG/DL
BUN SERPL-MCNC: 14.1 MG/DL (ref 6–20)
CALCIUM SERPL-MCNC: 9.8 MG/DL (ref 8.6–10)
CHLORIDE SERPL-SCNC: 103 MMOL/L (ref 98–107)
CREAT SERPL-MCNC: 1.32 MG/DL (ref 0.67–1.17)
DEPRECATED HCO3 PLAS-SCNC: 22 MMOL/L (ref 22–29)
EOSINOPHIL # BLD AUTO: 0.1 10E3/UL (ref 0–0.7)
EOSINOPHIL NFR BLD AUTO: 3 %
ERYTHROCYTE [DISTWIDTH] IN BLOOD BY AUTOMATED COUNT: 15.8 % (ref 10–15)
GFR SERPL CREATININE-BSD FRML MDRD: 64 ML/MIN/1.73M2
GLUCOSE SERPL-MCNC: 115 MG/DL (ref 70–99)
HCT VFR BLD AUTO: 32.8 % (ref 40–53)
HGB BLD-MCNC: 10.4 G/DL (ref 13.3–17.7)
INR PPP: 1.43 (ref 0.85–1.15)
LYMPHOCYTES # BLD AUTO: 1.6 10E3/UL (ref 0.8–5.3)
LYMPHOCYTES NFR BLD AUTO: 33 %
MCH RBC QN AUTO: 31 PG (ref 26.5–33)
MCHC RBC AUTO-ENTMCNC: 31.7 G/DL (ref 31.5–36.5)
MCV RBC AUTO: 98 FL (ref 78–100)
MONOCYTES # BLD AUTO: 0.6 10E3/UL (ref 0–1.3)
MONOCYTES NFR BLD AUTO: 12 %
NEUTROPHILS # BLD AUTO: 2.6 10E3/UL (ref 1.6–8.3)
NEUTROPHILS NFR BLD AUTO: 52 %
PLATELET # BLD AUTO: 135 10E3/UL (ref 150–450)
POTASSIUM SERPL-SCNC: 3.8 MMOL/L (ref 3.4–5.3)
RBC # BLD AUTO: 3.36 10E6/UL (ref 4.4–5.9)
SODIUM SERPL-SCNC: 139 MMOL/L (ref 136–145)
WBC # BLD AUTO: 4.9 10E3/UL (ref 4–11)

## 2022-10-25 PROCEDURE — 80048 BASIC METABOLIC PNL TOTAL CA: CPT

## 2022-10-25 PROCEDURE — 36415 COLL VENOUS BLD VENIPUNCTURE: CPT

## 2022-10-25 PROCEDURE — 82247 BILIRUBIN TOTAL: CPT

## 2022-10-25 PROCEDURE — 85610 PROTHROMBIN TIME: CPT

## 2022-10-25 PROCEDURE — 85025 COMPLETE CBC W/AUTO DIFF WBC: CPT

## 2022-10-26 ENCOUNTER — OFFICE VISIT (OUTPATIENT)
Dept: FAMILY MEDICINE | Facility: CLINIC | Age: 57
End: 2022-10-26
Payer: COMMERCIAL

## 2022-10-26 ENCOUNTER — HOSPITAL ENCOUNTER (OUTPATIENT)
Dept: PHYSICAL THERAPY | Facility: CLINIC | Age: 57
Setting detail: THERAPIES SERIES
Discharge: HOME | End: 2022-10-26
Attending: FAMILY MEDICINE
Payer: COMMERCIAL

## 2022-10-26 VITALS
BODY MASS INDEX: 34.72 KG/M2 | HEIGHT: 73 IN | RESPIRATION RATE: 18 BRPM | TEMPERATURE: 97.2 F | DIASTOLIC BLOOD PRESSURE: 60 MMHG | WEIGHT: 262 LBS | OXYGEN SATURATION: 100 % | HEART RATE: 80 BPM | SYSTOLIC BLOOD PRESSURE: 110 MMHG

## 2022-10-26 DIAGNOSIS — F10.21 ALCOHOL DEPENDENCE IN REMISSION (H): ICD-10-CM

## 2022-10-26 DIAGNOSIS — K76.82 HEPATIC ENCEPHALOPATHY (H): ICD-10-CM

## 2022-10-26 DIAGNOSIS — D50.0 IRON DEFICIENCY ANEMIA DUE TO CHRONIC BLOOD LOSS: ICD-10-CM

## 2022-10-26 DIAGNOSIS — K70.30 ALCOHOLIC CIRRHOSIS, UNSPECIFIED WHETHER ASCITES PRESENT (H): Primary | ICD-10-CM

## 2022-10-26 PROBLEM — I85.00 ESOPHAGEAL VARICES WITHOUT BLEEDING (H): Status: ACTIVE | Noted: 2022-02-01

## 2022-10-26 PROCEDURE — 97110 THERAPEUTIC EXERCISES: CPT | Mod: GP | Performed by: PHYSICAL THERAPIST

## 2022-10-26 PROCEDURE — 90471 IMMUNIZATION ADMIN: CPT | Performed by: FAMILY MEDICINE

## 2022-10-26 PROCEDURE — 90677 PCV20 VACCINE IM: CPT | Performed by: FAMILY MEDICINE

## 2022-10-26 PROCEDURE — 99214 OFFICE O/P EST MOD 30 MIN: CPT | Mod: 25 | Performed by: FAMILY MEDICINE

## 2022-10-26 ASSESSMENT — PAIN SCALES - GENERAL: PAINLEVEL: NO PAIN (0)

## 2022-10-26 NOTE — PROGRESS NOTES
Assessment & Plan     Alcoholic cirrhosis, unspecified whether ascites present (H)  Alcohol dependence in remission (H)  Hepatic encephalopathy  -- On furosemide 40 mg daily, spironolactone 100 mg daily, rifaximin 550 mg twice daily, and lactulose 30 g 3 times daily.  --Follows with [facility]  --Reports going to proceed with variceal banding with [facility] on 10/28.    Iron deficiency anemia due to chronic blood loss  On iron supplementation every other day.  Tolerating this.  Plan to recheck iron studies in 3 months.  - CBC with platelets and differential  - Ferritin  - Iron and iron binding capacity    Follow up in 2-3 months for continued cares or sooner as needed.     The risks, benefits and treatment options of prescribed medications or other treatments have been discussed with the patient. The patient verbalized their understanding and should call or follow up if no improvement or if they develop further problems.    >30 minutes spent on the date of the encounter doing chart review, history and exam, documentation and further activities as noted above        Chico Wong [facility] is a 57 year old accompanied by his spouse, presenting for the following health issues:  RECHECK      History of Present Illness       Reason for visit:  F/U liver issues    He eats 4 or more servings of fruits and vegetables daily.He consumes 1 sweetened beverage(s) daily.He exercises with enough effort to increase his heart rate 9 or less minutes per day.  He exercises with enough effort to increase his heart rate 3 or less days per week.   He is taking medications regularly.     57-year-old male who presents to clinic for follow-up after recent visit on 9/26/2022.      Today in clinic:   Recent visit with [facility]. Has increased lactulose recently from 30 g three times daily.   Continues to have 1 bowel movement per day.   Continued other medications.   Recent upper  "endoscopy from [facility].   Had a polyp removed from the stomach which was reported as benign by his wife.     Going to have variceal banding 10/28 through [facility]>     Recently started physical therapy. Reports being pleasantly surprised with this.   Crystal continues to have some issues with getting up and ambulating but improving.     Iron deficiency anemia  On iron supplementation every other day. Tolerating without issues.     Review of Systems   Constitutional, HEENT, cardiovascular, pulmonary, gi and gu systems are negative, except as otherwise noted.      Objective    /60 (BP Location: Left arm, Patient Position: Chair, Cuff Size: Adult Regular)   Pulse 80   Temp 97.2  F (36.2  C) (Tympanic)   Resp 18   Ht 1.865 m (6' 1.43\")   Wt 118.8 kg (262 lb)   SpO2 100%   BMI 34.17 kg/m    Body mass index is 34.17 kg/m .  Physical Exam   General: alert, no acute distress. Very slow moving.   CV: RRR, no murmur  Resp: non-labored breathing, clear to auscultation, no wheezing or rales   Abdomen: Soft, non-tender, no guarding.   Extremities: No peripheral edema, calves non-tender.       "

## 2022-10-28 ENCOUNTER — TRANSFERRED RECORDS (OUTPATIENT)
Dept: HEALTH INFORMATION MANAGEMENT | Facility: CLINIC | Age: 57
End: 2022-10-28

## 2022-11-30 ENCOUNTER — TELEPHONE (OUTPATIENT)
Dept: FAMILY MEDICINE | Facility: CLINIC | Age: 57
End: 2022-11-30

## 2022-11-30 DIAGNOSIS — N50.89 SCROTAL SWELLING: Primary | ICD-10-CM

## 2022-11-30 NOTE — TELEPHONE ENCOUNTER
Order/Referral Request    Who is requesting: Patient    Orders being requested: Ultrasound for scrotum     Reason service is needed/diagnosis: Patient had an ultrasound done. Believes they didn't complete ultrasound correctly. States they did not go near scrotum. Now, Right scrotum is 3 times larger than the left. Would like new orders for ultrasound        Where to send orders: N/A    Could we send this information to you in [employer] or would you prefer to receive a phone call?:   Patient would prefer a phone call   Okay to leave a detailed message?: Yes at Other phone number:  [phone]- Spouse- Maryan

## 2022-12-01 NOTE — TELEPHONE ENCOUNTER
Dr Wong,    Please see telephone notes & advise next steps.    Pt had US 10/2 for hernia eval. Neg for hernia, showed possible variocele.  Having continued/worsening sx.    Tata Joy RN

## 2022-12-01 NOTE — TELEPHONE ENCOUNTER
"Called pt.   States right testicle is now 2-3x larger than the left.  Pt has pain daily \"to varying degrees\". Does not think pain is related to activity level. Voiding ok, no blood in urine.    Routing to provider for review.    Tata Joy RN    "

## 2022-12-02 ENCOUNTER — HOSPITAL ENCOUNTER (OUTPATIENT)
Dept: ULTRASOUND IMAGING | Facility: HOSPITAL | Age: 57
Discharge: HOME | End: 2022-12-02
Attending: FAMILY MEDICINE | Admitting: FAMILY MEDICINE
Payer: COMMERCIAL

## 2022-12-02 DIAGNOSIS — N50.89 SCROTAL SWELLING: ICD-10-CM

## 2022-12-02 PROCEDURE — 76870 US EXAM SCROTUM: CPT

## 2022-12-06 DIAGNOSIS — K70.30 ALCOHOLIC CIRRHOSIS, UNSPECIFIED WHETHER ASCITES PRESENT (H): Primary | ICD-10-CM

## 2022-12-11 ENCOUNTER — HOSPITAL ENCOUNTER (OUTPATIENT)
Dept: ULTRASOUND IMAGING | Facility: CLINIC | Age: 57
Discharge: HOME | End: 2022-12-11
Attending: INTERNAL MEDICINE | Admitting: INTERNAL MEDICINE
Payer: COMMERCIAL

## 2022-12-11 DIAGNOSIS — K70.30 ALCOHOLIC CIRRHOSIS, UNSPECIFIED WHETHER ASCITES PRESENT (H): ICD-10-CM

## 2022-12-11 PROCEDURE — 76705 ECHO EXAM OF ABDOMEN: CPT

## 2022-12-19 NOTE — PROGRESS NOTES
"[facility]    Outpatient Physical Therapy Discharge Note  Patient: Oscar Greenberg  : 1965    Beginning/End Dates of Reporting Period:  10/12/22 to 10/26/22    Referring Provider: Chico Higuera, DO    Therapy Diagnosis: LBP, b/l shoulder pain, deconditioning     Client Self Report: Very dull constant pain in B shoulders for couple months, cannot raise them up, getting up from chair or out of bed, cannot get up from floor. Biggets concern - overall weakness, get up from chair, get out of bed, roll in bed, lack of motion in shoulders. If I could make his shoulders better in a day -that would be best. Toward end of appt, wife ntoes he has numbenss botttom of both feet, they are seeing someone for neuropathy    Objective Measurements:  Objective Measure: PROM shld flexion R 95*, L 90* pain, abd R 90*, L 80*, ER R 50* at 45 abd, L 25* at 45abd  Details: hip exten L 0* pain L LB, walks slightly fwd bent, can do one rep SLR approx 5\" off table     Goals:  Goal Identifier ST goal   Goal Description Pt will have 140 degrees of shoulder flex and abd to be able to reach and wash his hair in 4 weeks.   Target Date 22   Date Met      Progress (detail required for progress note):       Goal Identifier ST goal   Goal Description Pt will have 50% fo lumbar extension to be able to improve his mobility and stand upright with a greater gait stride in 4 weeks.   Target Date 22   Date Met      Progress (detail required for progress note):       Goal Identifier LT goal   Goal Description Pt will have a gross LE strength of 4+/5 to be able to tolerate ambulating in a period of time in 10 weeks.   Target Date 23   Date Met      Progress (detail required for progress note):       Goal Identifier LT goal   Goal Description Pt will be independent in home strengthening program for self management of Sx in 10 " weeks.   Target Date 01/04/23   Date Met      Progress (detail required for progress note):       Plan:  Discharge from therapy.    Discharge:    Reason for Discharge: Patient chooses to discontinue therapy.    Equipment Issued: none    Discharge Plan: Patient to continue home program.

## 2022-12-26 ENCOUNTER — LAB (OUTPATIENT)
Dept: LAB | Facility: CLINIC | Age: 57
End: 2022-12-26
Payer: COMMERCIAL

## 2022-12-26 DIAGNOSIS — K70.30 ALCOHOLIC CIRRHOSIS OF LIVER (H): ICD-10-CM

## 2022-12-26 LAB
ANION GAP SERPL CALCULATED.3IONS-SCNC: 8 MMOL/L (ref 7–15)
BASOPHILS # BLD AUTO: 0 10E3/UL (ref 0–0.2)
BASOPHILS NFR BLD AUTO: 0 %
BILIRUB SERPL-MCNC: 1.3 MG/DL
BUN SERPL-MCNC: 13.3 MG/DL (ref 6–20)
CALCIUM SERPL-MCNC: 9.3 MG/DL (ref 8.6–10)
CHLORIDE SERPL-SCNC: 104 MMOL/L (ref 98–107)
CREAT SERPL-MCNC: 1.24 MG/DL (ref 0.67–1.17)
DEPRECATED HCO3 PLAS-SCNC: 26 MMOL/L (ref 22–29)
EOSINOPHIL # BLD AUTO: 0.1 10E3/UL (ref 0–0.7)
EOSINOPHIL NFR BLD AUTO: 4 %
ERYTHROCYTE [DISTWIDTH] IN BLOOD BY AUTOMATED COUNT: 15.7 % (ref 10–15)
GFR SERPL CREATININE-BSD FRML MDRD: 69 ML/MIN/1.73M2
GLUCOSE SERPL-MCNC: 123 MG/DL (ref 70–99)
HCT VFR BLD AUTO: 34.4 % (ref 40–53)
HGB BLD-MCNC: 11 G/DL (ref 13.3–17.7)
INR PPP: 1.46 (ref 0.85–1.15)
LYMPHOCYTES # BLD AUTO: 1 10E3/UL (ref 0.8–5.3)
LYMPHOCYTES NFR BLD AUTO: 31 %
MCH RBC QN AUTO: 32.4 PG (ref 26.5–33)
MCHC RBC AUTO-ENTMCNC: 32 G/DL (ref 31.5–36.5)
MCV RBC AUTO: 101 FL (ref 78–100)
MONOCYTES # BLD AUTO: 0.4 10E3/UL (ref 0–1.3)
MONOCYTES NFR BLD AUTO: 13 %
NEUTROPHILS # BLD AUTO: 1.7 10E3/UL (ref 1.6–8.3)
NEUTROPHILS NFR BLD AUTO: 52 %
PLATELET # BLD AUTO: 101 10E3/UL (ref 150–450)
POTASSIUM SERPL-SCNC: 4.2 MMOL/L (ref 3.4–5.3)
RBC # BLD AUTO: 3.4 10E6/UL (ref 4.4–5.9)
SODIUM SERPL-SCNC: 138 MMOL/L (ref 136–145)
WBC # BLD AUTO: 3.2 10E3/UL (ref 4–11)

## 2022-12-26 PROCEDURE — 36415 COLL VENOUS BLD VENIPUNCTURE: CPT

## 2022-12-26 PROCEDURE — 80048 BASIC METABOLIC PNL TOTAL CA: CPT

## 2022-12-26 PROCEDURE — 85610 PROTHROMBIN TIME: CPT

## 2022-12-26 PROCEDURE — 82247 BILIRUBIN TOTAL: CPT

## 2022-12-26 PROCEDURE — 85025 COMPLETE CBC W/AUTO DIFF WBC: CPT

## 2023-01-05 ENCOUNTER — TRANSFERRED RECORDS (OUTPATIENT)
Dept: HEALTH INFORMATION MANAGEMENT | Facility: CLINIC | Age: 58
End: 2023-01-05
Payer: COMMERCIAL

## 2023-05-01 ENCOUNTER — LAB (OUTPATIENT)
Dept: LAB | Facility: CLINIC | Age: 58
End: 2023-05-01
Payer: COMMERCIAL

## 2023-05-01 DIAGNOSIS — K70.30 ALCOHOLIC CIRRHOSIS OF LIVER (H): ICD-10-CM

## 2023-05-01 LAB
ANION GAP SERPL CALCULATED.3IONS-SCNC: 7 MMOL/L (ref 7–15)
BASOPHILS # BLD AUTO: 0 10E3/UL (ref 0–0.2)
BASOPHILS NFR BLD AUTO: 1 %
BILIRUB SERPL-MCNC: 1.4 MG/DL
BUN SERPL-MCNC: 12 MG/DL (ref 6–20)
CALCIUM SERPL-MCNC: 9.1 MG/DL (ref 8.6–10)
CHLORIDE SERPL-SCNC: 103 MMOL/L (ref 98–107)
CREAT SERPL-MCNC: 1.12 MG/DL (ref 0.67–1.17)
DEPRECATED HCO3 PLAS-SCNC: 26 MMOL/L (ref 22–29)
EOSINOPHIL # BLD AUTO: 0.1 10E3/UL (ref 0–0.7)
EOSINOPHIL NFR BLD AUTO: 2 %
ERYTHROCYTE [DISTWIDTH] IN BLOOD BY AUTOMATED COUNT: 15.2 % (ref 10–15)
GFR SERPL CREATININE-BSD FRML MDRD: 78 ML/MIN/1.73M2
GLUCOSE SERPL-MCNC: 128 MG/DL (ref 70–99)
HCT VFR BLD AUTO: 35.5 % (ref 40–53)
HGB BLD-MCNC: 11.4 G/DL (ref 13.3–17.7)
IMM GRANULOCYTES # BLD: 0 10E3/UL
IMM GRANULOCYTES NFR BLD: 0 %
INR PPP: 1.4 (ref 0.85–1.15)
LYMPHOCYTES # BLD AUTO: 1 10E3/UL (ref 0.8–5.3)
LYMPHOCYTES NFR BLD AUTO: 29 %
MCH RBC QN AUTO: 30.6 PG (ref 26.5–33)
MCHC RBC AUTO-ENTMCNC: 32.1 G/DL (ref 31.5–36.5)
MCV RBC AUTO: 95 FL (ref 78–100)
MONOCYTES # BLD AUTO: 0.4 10E3/UL (ref 0–1.3)
MONOCYTES NFR BLD AUTO: 12 %
NEUTROPHILS # BLD AUTO: 2 10E3/UL (ref 1.6–8.3)
NEUTROPHILS NFR BLD AUTO: 56 %
NRBC # BLD AUTO: 0 10E3/UL
NRBC BLD AUTO-RTO: 0 /100
PLATELET # BLD AUTO: 92 10E3/UL (ref 150–450)
POTASSIUM SERPL-SCNC: 4.4 MMOL/L (ref 3.4–5.3)
RBC # BLD AUTO: 3.73 10E6/UL (ref 4.4–5.9)
SODIUM SERPL-SCNC: 136 MMOL/L (ref 136–145)
WBC # BLD AUTO: 3.6 10E3/UL (ref 4–11)

## 2023-05-01 PROCEDURE — 85610 PROTHROMBIN TIME: CPT

## 2023-05-01 PROCEDURE — 36415 COLL VENOUS BLD VENIPUNCTURE: CPT

## 2023-05-01 PROCEDURE — 85025 COMPLETE CBC W/AUTO DIFF WBC: CPT

## 2023-05-01 PROCEDURE — 82247 BILIRUBIN TOTAL: CPT

## 2023-05-01 PROCEDURE — 80048 BASIC METABOLIC PNL TOTAL CA: CPT

## 2023-05-03 DIAGNOSIS — K70.30 CIRRHOSIS, ALCOHOLIC (H): Primary | ICD-10-CM

## 2023-06-06 ENCOUNTER — LAB (OUTPATIENT)
Dept: LAB | Facility: CLINIC | Age: 58
End: 2023-06-06
Payer: COMMERCIAL

## 2023-06-06 DIAGNOSIS — K70.30 CIRRHOSIS, ALCOHOLIC (H): ICD-10-CM

## 2023-06-06 LAB
ALBUMIN SERPL BCG-MCNC: 3.5 G/DL (ref 3.5–5.2)
ALP SERPL-CCNC: 84 U/L (ref 40–129)
ALT SERPL W P-5'-P-CCNC: 15 U/L (ref 10–50)
ANION GAP SERPL CALCULATED.3IONS-SCNC: 12 MMOL/L (ref 7–15)
AST SERPL W P-5'-P-CCNC: 33 U/L (ref 10–50)
BASOPHILS # BLD AUTO: 0 10E3/UL (ref 0–0.2)
BASOPHILS NFR BLD AUTO: 1 %
BILIRUB SERPL-MCNC: 1.5 MG/DL
BUN SERPL-MCNC: 10.4 MG/DL (ref 6–20)
CALCIUM SERPL-MCNC: 9.3 MG/DL (ref 8.6–10)
CHLORIDE SERPL-SCNC: 104 MMOL/L (ref 98–107)
CREAT SERPL-MCNC: 1.21 MG/DL (ref 0.67–1.17)
DEPRECATED HCO3 PLAS-SCNC: 21 MMOL/L (ref 22–29)
EOSINOPHIL # BLD AUTO: 0.1 10E3/UL (ref 0–0.7)
EOSINOPHIL NFR BLD AUTO: 2 %
ERYTHROCYTE [DISTWIDTH] IN BLOOD BY AUTOMATED COUNT: 14.9 % (ref 10–15)
GFR SERPL CREATININE-BSD FRML MDRD: 71 ML/MIN/1.73M2
GLUCOSE SERPL-MCNC: 102 MG/DL (ref 70–99)
HCT VFR BLD AUTO: 33.4 % (ref 40–53)
HGB BLD-MCNC: 10.9 G/DL (ref 13.3–17.7)
IMM GRANULOCYTES # BLD: 0 10E3/UL
IMM GRANULOCYTES NFR BLD: 0 %
INR PPP: 1.44 (ref 0.85–1.15)
LYMPHOCYTES # BLD AUTO: 1.2 10E3/UL (ref 0.8–5.3)
LYMPHOCYTES NFR BLD AUTO: 33 %
MCH RBC QN AUTO: 30.4 PG (ref 26.5–33)
MCHC RBC AUTO-ENTMCNC: 32.6 G/DL (ref 31.5–36.5)
MCV RBC AUTO: 93 FL (ref 78–100)
MONOCYTES # BLD AUTO: 0.4 10E3/UL (ref 0–1.3)
MONOCYTES NFR BLD AUTO: 12 %
NEUTROPHILS # BLD AUTO: 1.9 10E3/UL (ref 1.6–8.3)
NEUTROPHILS NFR BLD AUTO: 52 %
NRBC # BLD AUTO: 0 10E3/UL
NRBC BLD AUTO-RTO: 0 /100
PLATELET # BLD AUTO: 104 10E3/UL (ref 150–450)
POTASSIUM SERPL-SCNC: 4.1 MMOL/L (ref 3.4–5.3)
PROT SERPL-MCNC: 7 G/DL (ref 6.4–8.3)
RBC # BLD AUTO: 3.59 10E6/UL (ref 4.4–5.9)
SODIUM SERPL-SCNC: 137 MMOL/L (ref 136–145)
WBC # BLD AUTO: 3.6 10E3/UL (ref 4–11)

## 2023-06-06 PROCEDURE — 85610 PROTHROMBIN TIME: CPT

## 2023-06-06 PROCEDURE — 85025 COMPLETE CBC W/AUTO DIFF WBC: CPT

## 2023-06-06 PROCEDURE — 80053 COMPREHEN METABOLIC PANEL: CPT

## 2023-06-06 PROCEDURE — 36415 COLL VENOUS BLD VENIPUNCTURE: CPT

## 2023-08-30 ENCOUNTER — OFFICE VISIT (OUTPATIENT)
Dept: FAMILY MEDICINE | Facility: CLINIC | Age: 58
End: 2023-08-30
Payer: COMMERCIAL

## 2023-08-30 VITALS
BODY MASS INDEX: 36.57 KG/M2 | SYSTOLIC BLOOD PRESSURE: 110 MMHG | RESPIRATION RATE: 20 BRPM | OXYGEN SATURATION: 100 % | WEIGHT: 285 LBS | DIASTOLIC BLOOD PRESSURE: 70 MMHG | HEIGHT: 74 IN | HEART RATE: 64 BPM | TEMPERATURE: 96.4 F

## 2023-08-30 DIAGNOSIS — E66.812 CLASS 2 SEVERE OBESITY DUE TO EXCESS CALORIES WITH SERIOUS COMORBIDITY AND BODY MASS INDEX (BMI) OF 36.0 TO 36.9 IN ADULT (H): ICD-10-CM

## 2023-08-30 DIAGNOSIS — E66.01 CLASS 2 SEVERE OBESITY DUE TO EXCESS CALORIES WITH SERIOUS COMORBIDITY AND BODY MASS INDEX (BMI) OF 36.0 TO 36.9 IN ADULT (H): ICD-10-CM

## 2023-08-30 DIAGNOSIS — H93.13 TINNITUS, BILATERAL: ICD-10-CM

## 2023-08-30 DIAGNOSIS — H91.90 HEARING LOSS, UNSPECIFIED HEARING LOSS TYPE, UNSPECIFIED LATERALITY: Primary | ICD-10-CM

## 2023-08-30 PROCEDURE — 99213 OFFICE O/P EST LOW 20 MIN: CPT | Performed by: FAMILY MEDICINE

## 2023-08-30 ASSESSMENT — PAIN SCALES - GENERAL: PAINLEVEL: NO PAIN (0)

## 2023-08-30 NOTE — TELEPHONE ENCOUNTER
FUTURE VISIT INFORMATION      FUTURE VISIT INFORMATION:  Date: 10/31/23  Time: 8:20  Location: [facility]  REFERRAL INFORMATION:  Referring provider:  Chico Wong DO  Referring providers clinic:  [facility]  Reason for visit/diagnosis  Hearing loss, unspecified hearing loss type, unspecified laterality [H91.90]; Tinnitus, bilateral [H93.13]  ref by Chico Wong DO  recs in epic  confirmed loc/sched with pt     RECORDS REQUESTED FROM:       Clinic name Comments Records Status Imaging Status   [facility] 8/30/23- OV w. Chico Wong DO [facility]

## 2023-08-30 NOTE — PROGRESS NOTES
Assessment & Plan     Hearing loss, unspecified hearing loss type, unspecified laterality  Tinnitus, bilateral  - no sudden complete hearing loss. No recent illness. Left ear> right. Also with tinnitus. Exam is unremarkable. Proceed with Audiology and ENT evaluation.   - Adult Audiology  Referral  - Adult ENT  Referral      Class 2 severe obesity due to excess calories with serious comorbidity and body mass index (BMI) of 36.0 to 36.9 in adult (H)  Known issue that I take into account for their medical decisions; no current exacerbations or new concerns.   BMI 36. Would benefit from weight loss.       The risks, benefits and treatment options of prescribed medications or other treatments have been discussed with the patient. The patient verbalized their understanding and should call or follow up if no improvement or if they develop further problems.        Chico Wong DO  [facility]    Janeth Moore is a 58 year old, presenting for the following health issues:  Hearing Problem        8/30/2023     8:49 AM   Additional Questions   Roomed by Kristie CHOWDARY       History of Present Illness       Reason for visit:  Hearing concerns    He eats 4 or more servings of fruits and vegetables daily.He consumes 1 sweetened beverage(s) daily.He exercises with enough effort to increase his heart rate 30 to 60 minutes per day.  He exercises with enough effort to increase his heart rate 5 days per week.   He is taking medications regularly.     58 year old male who presents to clinic for hearing concerns.       General overall decreased in hearing left> right.   Hearing is decreased with background noise.   No complete loss of hearing.   Historically in the  so around loud noises.   Used to be a .   Hearing has worsened over the past 5 years.   Tinnitus in both ears. Left worse than right.   Evenly pitched noise, non pulsatile. This can last for 2 mins to 4 hours.  "  No prior surgeries on the ears.   Does not believe any PE tubes as a child.   Wishes to have a hearing test and be evaluated by ENT.  No recent illness.  No fevers.   Scheduled to have a brain MRI in 2-3 weeks through [facility] to look for causes of Encephalopathy.       Review of Systems   Constitutional, HEENT, cardiovascular, pulmonary, gi and gu systems are negative, except as otherwise noted.      Objective    /70 (BP Location: Right arm, Patient Position: Chair, Cuff Size: Adult Regular)   Pulse 64   Temp (!) 96.4  F (35.8  C) (Tympanic)   Resp 20   Ht 1.87 m (6' 1.62\")   Wt 129.3 kg (285 lb)   SpO2 100%   BMI 36.97 kg/m    Body mass index is 36.97 kg/m .  Physical Exam   General: Alert, cooperative, no acute distress  Head: Normocephalic, atraumatic   Eyes: PERRL, no scleral icterus, no conjunctival injection   Ears: External ear without deformity, external canals patent, TM intact with no signs of infection   Nose: nares patent  Throat: Oropharynx clear, non-erythematous, tonsils non-enlarged   Neck: Non-tender to palpation, no cervical lymphadenopathy, thyroid non-enlarged  CV: RRR, no murmur   Resp: non-labored breathing, clear to auscultation, no wheezing or rales.           "

## 2023-10-31 ENCOUNTER — PRE VISIT (OUTPATIENT)
Dept: OTOLARYNGOLOGY | Facility: CLINIC | Age: 58
End: 2023-10-31

## 2023-10-31 ENCOUNTER — OFFICE VISIT (OUTPATIENT)
Dept: AUDIOLOGY | Facility: CLINIC | Age: 58
End: 2023-10-31
Attending: FAMILY MEDICINE
Payer: COMMERCIAL

## 2023-10-31 ENCOUNTER — OFFICE VISIT (OUTPATIENT)
Dept: OTOLARYNGOLOGY | Facility: CLINIC | Age: 58
End: 2023-10-31
Attending: FAMILY MEDICINE
Payer: COMMERCIAL

## 2023-10-31 VITALS
HEART RATE: 68 BPM | BODY MASS INDEX: 35.68 KG/M2 | WEIGHT: 278 LBS | HEIGHT: 74 IN | OXYGEN SATURATION: 100 % | DIASTOLIC BLOOD PRESSURE: 75 MMHG | SYSTOLIC BLOOD PRESSURE: 110 MMHG

## 2023-10-31 DIAGNOSIS — H91.90 HEARING LOSS, UNSPECIFIED HEARING LOSS TYPE, UNSPECIFIED LATERALITY: ICD-10-CM

## 2023-10-31 DIAGNOSIS — H93.13 TINNITUS, BILATERAL: ICD-10-CM

## 2023-10-31 DIAGNOSIS — H93.13 TINNITUS OF BOTH EARS: Primary | ICD-10-CM

## 2023-10-31 PROCEDURE — 99203 OFFICE O/P NEW LOW 30 MIN: CPT | Performed by: REGISTERED NURSE

## 2023-10-31 PROCEDURE — 92557 COMPREHENSIVE HEARING TEST: CPT | Performed by: AUDIOLOGIST-HEARING AID FITTER

## 2023-10-31 PROCEDURE — 92550 TYMPANOMETRY & REFLEX THRESH: CPT | Performed by: AUDIOLOGIST-HEARING AID FITTER

## 2023-10-31 ASSESSMENT — PAIN SCALES - GENERAL: PAINLEVEL: NO PAIN (0)

## 2023-10-31 NOTE — PROGRESS NOTES
Otolaryngology Clinic  October 31, 2023    Chief Complaint:   Intermittent hearing loss       History of Present Illness:   Oscar Greenberg is a 58 year old male who presents today for evaluation of intermittent hearing loss and tinnitus. Patient reports a longstanding history (20+ years) of episodes of unilateral hearing loss and muffled hearing that lasts less than an hour at a time. Frequency of these events has increased over the past couple of years. More frequent in the left ear. Central tinnitus that changes in severity. Tinnitus started a few years ago. Also reports a harder time hearing in crowded/noisy environments. Hyperacusis with certain frequencies that improves when certain frequency noise stops. Patient reports that, since entering clinic room, left ear feels muffled. Symptoms were not present during audiogram testing.    Patient denies any otalgia, otorrhea, history of frequent ear infections, or ear surgeries. Family history of hearing loss, father wears hearing aids. Patient reports that he is currently being evaluated at [facility] for a liver transplant. Reports following with neurology for brain fog and headaches.     Past Medical History:  Past Medical History:   Diagnosis Date    Alcohol abuse      Past Surgical History:  Past Surgical History:   Procedure Laterality Date    COLONOSCOPY N/A 2/8/2019    Procedure: COMBINED COLONOSCOPY, SINGLE OR MULTIPLE BIOPSY/POLYPECTOMY BY BIOPSY;  Surgeon: Toño Escamilla DO;  Location: [facility]    ENDOSCOPIC RETROGRADE CHOLANGIOPANCREATOGRAM N/A 6/7/2016    Procedure: COMBINED ENDOSCOPIC RETROGRADE CHOLANGIOPANCREATOGRAPHY, PLACE TUBE/STENT;  Surgeon: Stephane Hunter MD;  Location: [facility]    LAPAROSCOPIC CHOLECYSTECTOMY WITH CHOLANGIOGRAMS N/A 6/2/2016    Procedure: LAPAROSCOPIC CHOLECYSTECTOMY WITH CHOLANGIOGRAMS;  Surgeon: Darian Mcclain MD;  Location: [facility]    NO HISTORY OF SURGERY         Medications:  Current Outpatient Medications  "  Medication Sig Dispense Refill    furosemide (LASIX) 20 MG tablet Take 40 mg by mouth every 24 hours       lactulose (CHRONULAC) 10 GM/15ML solution Take 30 g by mouth 3 times daily      multivitamin w/minerals (THERA-VIT-M) tablet Take 1 tablet by mouth every 24 hours      omeprazole (PRILOSEC) 20 MG DR capsule Take 20 mg by mouth      rifaximin (XIFAXAN) 550 MG TABS tablet Take 1 tablet by mouth every 12 hours      spironolactone (ALDACTONE) 50 MG tablet Take 100 mg by mouth every 24 hours       traZODone (DESYREL) 50 MG tablet Take 100 mg by mouth nightly as needed      ferrous sulfate (FE TABS) 325 (65 Fe) MG EC tablet Take 1 tablet (325 mg) by mouth every other day 45 tablet 3    midodrine (PROAMATINE) 10 MG tablet Take 1 tablet by mouth every 8 hours         Allergies:  Allergies   Allergen Reactions    Nka [No Known Allergies]         Social History:  Social History     Tobacco Use    Smoking status: Never    Smokeless tobacco: Never   Vaping Use    Vaping Use: Never used   Substance Use Topics    Alcohol use: Not Currently     Alcohol/week: 6.0 standard drinks of alcohol     Types: 6 Shots of liquor per week     Comment: none since 9/2021    Drug use: Never       ROS: 10 point ROS neg other than the symptoms noted above in the HPI.    Physical Exam:    /75 (BP Location: Right arm, Patient Position: Sitting, Cuff Size: Adult Large)   Pulse 68   Ht 1.867 m (6' 1.5\")   Wt 126.1 kg (278 lb)   SpO2 100%   BMI 36.18 kg/m       Constitutional:  The patient was accompanied, well-groomed, and in no acute distress.     Skin: Normal:  warm and pink without rash    Neurologic: Alert and oriented x 3.  CN's III-XII within normal limits.  Voice normal.    Psychiatric: The patient's affect was calm, cooperative, and appropriate.     Communication:  Normal; communicates verbally, normal voice quality.    Respiratory: Breathing comfortably without stridor or exertion of accessory muscles.    Ears: Pinnae and " tragus non-tender.  EAC's and TM's were clear.  Ennis: lateralizes to the right. Rinne: A>B bilaterally     Audiogram: 10/31/2023 - data independently reviewed  Normal hearing bilaterally   % at 50 dB bilaterally   Acoustic Reflexes: Present in all conditions  Tympanograms: type A bilaterally        Assessment and Plan:  1. Hearing loss, unspecified hearing loss type, unspecified laterality  Patient with intermittent episodes unilateral hearing loss and fullness. Symptoms occur bilaterally and typically resolve within an hour. There is no dizziness with episodes. Audiogram today shows normal hearing bilaterally. Patient is symptomatic since completing audiogram. Ennis lateralizes to the right to indicate a left sensorineural loss. Suspect that patient is having transient auditory dysfunction. Reassured patient that hearing is normal and episodes do not indicate an impending hearing loss. Recommend that patient monitor at this time. If patient notices that hearing does not recover over the course of a couple of hours, patient should contact clinic for repeat audiogram and assessment.    2. Tinnitus, bilateral  Patient with intermittent bilateral tinnitus.  Reviewed audiogram today with patient which shows normal hearing bilaterally.  There is no evidence of hearing loss that could be contributing to tinnitus symptoms.  Explained to patient that tinnitus is a central process, meaning that it is a brain generated noise.  Reviewed other factors that can contribute to tinnitus including stress, anxiety, lack of sleep, and neck/muscle tension.     Explained to patient that there is no specific medication or procedure that can eliminate tinnitus, however, there are evidence-based management strategies that can be used to improve symptoms.  Discussed management strategies with patient including tinnitus masking. Recommend tinnitus masking with symptoms are bothersome.    Patient will monitor symptoms at this time and  return to clinic if symptoms remain persistent or if patient is experiencing any new hearing or ear symptoms.    Farida Pedro DNP, APRN, CNP  Otolaryngology  Head & Neck Surgery  [phone]    30 minutes spent by me on the date of the encounter doing chart review, history and exam, documentation and further activities per the note

## 2023-10-31 NOTE — NURSING NOTE
"Chief Complaint   Patient presents with    Consult     Hearing Loss       Blood pressure 110/75, pulse 68, height 1.867 m (6' 1.5\"), weight 126.1 kg (278 lb), SpO2 100%.    Reba Patel, EMT    "

## 2023-10-31 NOTE — PROGRESS NOTES
AUDIOLOGY REPORT    SUMMARY: Audiology visit completed. See audiogram for results.      RECOMMENDATIONS: Follow-up with ENT.    Julia Pa, CCC-A, [facility]  Licensed Audiologist  [place] #[id number]

## 2023-10-31 NOTE — LETTER
10/31/2023       RE: Oscar Greenberg  [address]  [place] [address]     Dear Colleague,    Thank you for referring your patient, Oscar Greenberg, to the [facility] [place] at [facility]. Please see a copy of my visit note below.      Otolaryngology Clinic  October 31, 2023    Chief Complaint:   Intermittent hearing loss       History of Present Illness:   Oscar Greenberg is a 58 year old male who presents today for evaluation of intermittent hearing loss and tinnitus. Patient reports a longstanding history (20+ years) of episodes of unilateral hearing loss and muffled hearing that lasts less than an hour at a time. Frequency of these events has increased over the past couple of years. More frequent in the left ear. Central tinnitus that changes in severity. Tinnitus started a few years ago. Also reports a harder time hearing in crowded/noisy environments. Hyperacusis with certain frequencies that improves when certain frequency noise stops. Patient reports that, since entering clinic room, left ear feels muffled. Symptoms were not present during audiogram testing.    Patient denies any otalgia, otorrhea, history of frequent ear infections, or ear surgeries. Family history of hearing loss, father wears hearing aids. Patient reports that he is currently being evaluated at [place] for a liver transplant. Reports following with neurology for brain fog and headaches.     Past Medical History:  Past Medical History:   Diagnosis Date    Alcohol abuse      Past Surgical History:  Past Surgical History:   Procedure Laterality Date    COLONOSCOPY N/A 2/8/2019    Procedure: COMBINED COLONOSCOPY, SINGLE OR MULTIPLE BIOPSY/POLYPECTOMY BY BIOPSY;  Surgeon: Toño Escamilla DO;  Location: [facility]    ENDOSCOPIC RETROGRADE CHOLANGIOPANCREATOGRAM N/A 6/7/2016    Procedure: COMBINED ENDOSCOPIC RETROGRADE CHOLANGIOPANCREATOGRAPHY,  "PLACE TUBE/STENT;  Surgeon: Stephane Hunter MD;  Location: [facility]    LAPAROSCOPIC CHOLECYSTECTOMY WITH CHOLANGIOGRAMS N/A 6/2/2016    Procedure: LAPAROSCOPIC CHOLECYSTECTOMY WITH CHOLANGIOGRAMS;  Surgeon: Darian Mcclain MD;  Location: [facility]    NO HISTORY OF SURGERY         Medications:  Current Outpatient Medications   Medication Sig Dispense Refill    furosemide (LASIX) 20 MG tablet Take 40 mg by mouth every 24 hours       lactulose (CHRONULAC) 10 GM/15ML solution Take 30 g by mouth 3 times daily      multivitamin w/minerals (THERA-VIT-M) tablet Take 1 tablet by mouth every 24 hours      omeprazole (PRILOSEC) 20 MG DR capsule Take 20 mg by mouth      rifaximin (XIFAXAN) 550 MG TABS tablet Take 1 tablet by mouth every 12 hours      spironolactone (ALDACTONE) 50 MG tablet Take 100 mg by mouth every 24 hours       traZODone (DESYREL) 50 MG tablet Take 100 mg by mouth nightly as needed      ferrous sulfate (FE TABS) 325 (65 Fe) MG EC tablet Take 1 tablet (325 mg) by mouth every other day 45 tablet 3    midodrine (PROAMATINE) 10 MG tablet Take 1 tablet by mouth every 8 hours         Allergies:  Allergies   Allergen Reactions    Nka [No Known Allergies]         Social History:  Social History     Tobacco Use    Smoking status: Never    Smokeless tobacco: Never   Vaping Use    Vaping Use: Never used   Substance Use Topics    Alcohol use: Not Currently     Alcohol/week: 6.0 standard drinks of alcohol     Types: 6 Shots of liquor per week     Comment: none since 9/2021    Drug use: Never       ROS: 10 point ROS neg other than the symptoms noted above in the HPI.    Physical Exam:    /75 (BP Location: Right arm, Patient Position: Sitting, Cuff Size: Adult Large)   Pulse 68   Ht 1.867 m (6' 1.5\")   Wt 126.1 kg (278 lb)   SpO2 100%   BMI 36.18 kg/m       Constitutional:  The patient was accompanied, well-groomed, and in no acute distress.     Skin: Normal:  warm and pink without rash    Neurologic: Alert and " oriented x 3.  CN's III-XII within normal limits.  Voice normal.    Psychiatric: The patient's affect was calm, cooperative, and appropriate.     Communication:  Normal; communicates verbally, normal voice quality.    Respiratory: Breathing comfortably without stridor or exertion of accessory muscles.    Ears: Pinnae and tragus non-tender.  EAC's and TM's were clear.  Ennis: lateralizes to the right. Rinne: A>B bilaterally     Audiogram: 10/31/2023 - data independently reviewed  Normal hearing bilaterally   % at 50 dB bilaterally   Acoustic Reflexes: Present in all conditions  Tympanograms: type A bilaterally        Assessment and Plan:  1. Hearing loss, unspecified hearing loss type, unspecified laterality  Patient with intermittent episodes unilateral hearing loss and fullness. Symptoms occur bilaterally and typically resolve within an hour. There is no dizziness with episodes. Audiogram today shows normal hearing bilaterally. Patient is symptomatic since completing audiogram. Ennis lateralizes to the right to indicate a left sensorineural loss. Suspect that patient is having transient auditory dysfunction. Reassured patient that hearing is normal and episodes do not indicate an impending hearing loss. Recommend that patient monitor at this time. If patient notices that hearing does not recover over the course of a couple of hours, patient should contact clinic for repeat audiogram and assessment.    2. Tinnitus, bilateral  Patient with intermittent bilateral tinnitus.  Reviewed audiogram today with patient which shows normal hearing bilaterally.  There is no evidence of hearing loss that could be contributing to tinnitus symptoms.  Explained to patient that tinnitus is a central process, meaning that it is a brain generated noise.  Reviewed other factors that can contribute to tinnitus including stress, anxiety, lack of sleep, and neck/muscle tension.     Explained to patient that there is no specific  medication or procedure that can eliminate tinnitus, however, there are evidence-based management strategies that can be used to improve symptoms.  Discussed management strategies with patient including tinnitus masking. Recommend tinnitus masking with symptoms are bothersome.    Patient will monitor symptoms at this time and return to clinic if symptoms remain persistent or if patient is experiencing any new hearing or ear symptoms.    Farida Pedro DNP, APRN, CNP  Otolaryngology  Head & Neck Surgery  [phone]    30 minutes spent by me on the date of the encounter doing chart review, history and exam, documentation and further activities per the note        Again, thank you for allowing me to participate in the care of your patient.      Sincerely,    Brandy Pedro, NP

## 2023-11-26 ENCOUNTER — HEALTH MAINTENANCE LETTER (OUTPATIENT)
Age: 58
End: 2023-11-26

## 2023-11-28 ENCOUNTER — IMMUNIZATION (OUTPATIENT)
Dept: FAMILY MEDICINE | Facility: CLINIC | Age: 58
End: 2023-11-28
Payer: COMMERCIAL

## 2023-11-28 PROCEDURE — 90686 IIV4 VACC NO PRSV 0.5 ML IM: CPT

## 2023-11-28 PROCEDURE — 90480 ADMN SARSCOV2 VAC 1/ONLY CMP: CPT

## 2023-11-28 PROCEDURE — 91320 SARSCV2 VAC 30MCG TRS-SUC IM: CPT

## 2023-11-28 PROCEDURE — 90471 IMMUNIZATION ADMIN: CPT

## 2024-11-05 ENCOUNTER — IMMUNIZATION (OUTPATIENT)
Dept: FAMILY MEDICINE | Facility: CLINIC | Age: 59
End: 2024-11-05
Payer: COMMERCIAL

## 2024-11-05 PROCEDURE — G0008 ADMIN INFLUENZA VIRUS VAC: HCPCS

## 2024-11-05 PROCEDURE — 90480 ADMN SARSCOV2 VAC 1/ONLY CMP: CPT

## 2024-11-05 PROCEDURE — 90673 RIV3 VACCINE NO PRESERV IM: CPT

## 2024-11-05 PROCEDURE — 91320 SARSCV2 VAC 30MCG TRS-SUC IM: CPT

## 2024-11-09 ENCOUNTER — HEALTH MAINTENANCE LETTER (OUTPATIENT)
Age: 59
End: 2024-11-09

## 2025-05-19 ENCOUNTER — OFFICE VISIT (OUTPATIENT)
Dept: FAMILY MEDICINE | Facility: CLINIC | Age: 60
End: 2025-05-19
Payer: COMMERCIAL

## 2025-05-19 VITALS
DIASTOLIC BLOOD PRESSURE: 60 MMHG | RESPIRATION RATE: 20 BRPM | HEART RATE: 64 BPM | HEIGHT: 74 IN | WEIGHT: 293 LBS | TEMPERATURE: 98.2 F | BODY MASS INDEX: 37.6 KG/M2 | OXYGEN SATURATION: 97 % | SYSTOLIC BLOOD PRESSURE: 104 MMHG

## 2025-05-19 DIAGNOSIS — J01.90 ACUTE SINUSITIS WITH SYMPTOMS > 10 DAYS: Primary | ICD-10-CM

## 2025-05-19 PROCEDURE — 1125F AMNT PAIN NOTED PAIN PRSNT: CPT | Performed by: STUDENT IN AN ORGANIZED HEALTH CARE EDUCATION/TRAINING PROGRAM

## 2025-05-19 PROCEDURE — 3078F DIAST BP <80 MM HG: CPT | Performed by: STUDENT IN AN ORGANIZED HEALTH CARE EDUCATION/TRAINING PROGRAM

## 2025-05-19 PROCEDURE — 99213 OFFICE O/P EST LOW 20 MIN: CPT | Performed by: STUDENT IN AN ORGANIZED HEALTH CARE EDUCATION/TRAINING PROGRAM

## 2025-05-19 PROCEDURE — 3074F SYST BP LT 130 MM HG: CPT | Performed by: STUDENT IN AN ORGANIZED HEALTH CARE EDUCATION/TRAINING PROGRAM

## 2025-05-19 RX ORDER — LATANOPROST 50 UG/ML
1 SOLUTION/ DROPS OPHTHALMIC
COMMUNITY
Start: 2025-01-28

## 2025-05-19 RX ORDER — CARVEDILOL 3.12 MG/1
1 TABLET ORAL 2 TIMES DAILY WITH MEALS
COMMUNITY
Start: 2025-03-18

## 2025-05-19 RX ORDER — TIMOLOL MALEATE 5 MG/ML
1 SOLUTION/ DROPS OPHTHALMIC
COMMUNITY
Start: 2025-03-24

## 2025-05-19 RX ORDER — BRIMONIDINE TARTRATE 2 MG/ML
1 SOLUTION/ DROPS OPHTHALMIC
COMMUNITY
Start: 2025-03-16

## 2025-05-19 ASSESSMENT — PAIN SCALES - GENERAL: PAINLEVEL_OUTOF10: MILD PAIN (3)

## 2025-05-19 NOTE — PROGRESS NOTES
"  Assessment & Plan     Acute sinusitis with symptoms > 10 days  Patient is a very pleasant 59 year old who presents today for acute symptoms. Started with significant rhinorrhea about 12 days ago, progressed to sinus congestion, cough, phlegm production. Also having some headaches, sound more like scalp muscle spasms. On exam mild middle ear effusion and mild erythema right TM with postnasal drip and nasal congestion. Given the timecourse of symptoms, will treat with augmentin. Also given OTC recommendations including flonase, saline spray, sinus rinses. Follow up if needed. Did discuss that if this is not in fact bacterial, consider viral vs allergic.   - amoxicillin-clavulanate (AUGMENTIN) 875-125 MG tablet; Take 1 tablet by mouth 2 times daily for 7 days.    BMI  Estimated body mass index is 38.13 kg/m  as calculated from the following:    Height as of this encounter: 1.867 m (6' 1.5\").    Weight as of this encounter: 132.9 kg (293 lb).       Janeth Moore is a 59 year old, presenting for the following health issues:  Sinus Problem (X 12 days), Headache, and Cough        5/19/2025     2:04 PM   Additional Questions   Roomed by Naomy EDMOND   Accompanied by Wife Maryan     History of Present Illness       Headaches:   Since the patient's last clinic visit, headaches are: no change  The patient is getting headaches:   X 7 days  He is able to do normal daily activities when he has a migraine.  The patient is taking the following rescue/relief medications:  No rescue/relief medications   Patient states \"I get no relief\" from the rescue/relief medications.   The patient is taking the following medications to prevent migraines:  No medications to prevent migraines  In the past 4 weeks, the patient has gone to an Urgent Care or Emergency Room 0 times times due to headaches.    Reason for visit:  Sinus cough headache  Symptom onset:  1-2 weeks ago  Symptoms include:  Sinus drainage, sinus pain,cough headache  Symptom " "intensity:  Moderate  Symptom progression:  Staying the same  Had these symptoms before:  No  What makes it worse:  No  What makes it better:  No   He is taking medications regularly.        About 1.5 weeks ago, \"everything opened up\"   Drained, couldn't keep enough kleenex to his nose.   Still some drainage, but more clogged now.   \"Pretty clear until recently\" a little yellow tainted. A little bit of faint green episodes.     Coughing consistent throughout, pretty productive.   Very clogged in the head/sinus  Headache central head, small shooting moments.   Leaves a dull after pain.   Now moved more to the lateral left head around ear.     On a waiting list for liver transplant.     Almost no issues with anything before, no recurring anything.   Never gets headaches.     Very slight fevers, for a few days, this was the end of last week fri/sat.  No shortness of breath.   Wife was sick recently, too. Air quality in [place] was horrible.   Didn't notice anyone on the plane sick.   Wife did cough some when they got back.     Tried:   Taking a couple tylenol doses, maybe 3 times in the past 3 days.   Not helping headache much.       Review of Systems  Constitutional, HEENT, cardiovascular, pulmonary, gi and gu systems are negative, except as otherwise noted.      Objective    /60 (BP Location: Right arm, Patient Position: Sitting, Cuff Size: Adult Regular)   Pulse 64   Temp 98.2  F (36.8  C) (Tympanic)   Resp 20   Ht 1.867 m (6' 1.5\")   Wt 132.9 kg (293 lb)   SpO2 97%   BMI 38.13 kg/m    Body mass index is 38.13 kg/m .  Physical Exam  Constitutional:       Appearance: Normal appearance.   HENT:      Head: Normocephalic.      Right Ear: A middle ear effusion is present. Tympanic membrane is erythematous. Tympanic membrane is not bulging.      Left Ear: A middle ear effusion is present. Tympanic membrane is not bulging.      Nose: Congestion and rhinorrhea present. Rhinorrhea is clear.      Mouth/Throat:      " Pharynx: Posterior oropharyngeal erythema and postnasal drip present.   Eyes:      General: No scleral icterus.     Extraocular Movements: Extraocular movements intact.      Conjunctiva/sclera: Conjunctivae normal.   Cardiovascular:      Rate and Rhythm: Normal rate.   Pulmonary:      Effort: Pulmonary effort is normal.   Neurological:      General: No focal deficit present.      Mental Status: He is alert and oriented to person, place, and time.                  Signed Electronically by: Magnolia Kendrick MD

## 2025-05-19 NOTE — PATIENT INSTRUCTIONS
"Get this sinus rinse at [employer] or [employer]. There are instructions in the box on how to sterilize the device (usually wash with soap and water, then put in the microwave without the cap screwed on for about 10-15 seconds), then add distilled water and one of the salt packets. Then leaning over the sink, put the tip into one nostril, then squeeze the bottle to get the liquid up into the sinuses, keep your mouth open and avoid swallowing. This will rinse both sides of the sinuses.        Flonase (fluticasone) or Nasonex (mometasone) - 1 spray in each nostril once daily (preferably at bedtime). You can do it 1 additional time a day if needed for worse symptoms.   These are available over the counter.       Hypertonic nasal saline \"extra strength\" nasal saline - 1-2 sprays in each nostril 4 times a day  Decreases the length of symptom.   Decreases spreading throughout household.     "

## 2025-06-30 ENCOUNTER — ALLIED HEALTH/NURSE VISIT (OUTPATIENT)
Dept: FAMILY MEDICINE | Facility: CLINIC | Age: 60
End: 2025-06-30
Payer: COMMERCIAL

## 2025-06-30 DIAGNOSIS — Z23 ENCOUNTER FOR IMMUNIZATION: Primary | ICD-10-CM

## 2025-06-30 PROCEDURE — 99207 PR NO CHARGE NURSE ONLY: CPT

## 2025-06-30 PROCEDURE — 90746 HEPB VACCINE 3 DOSE ADULT IM: CPT

## 2025-06-30 PROCEDURE — G0010 ADMIN HEPATITIS B VACCINE: HCPCS

## 2025-06-30 NOTE — PROGRESS NOTES
Prior to immunization administration, verified patients identity using patient s name and date of birth. Please see Immunization Activity for additional information.     Is the patient's temperature normal (100.5 or less)? Yes     Patient MEETS CRITERIA. PROCEED with vaccine administration.          6/30/2025   Hepatitis B   Have you had a serious reaction to a hepatitis B vaccine or to something in a hepatitis B vaccine, including yeast)? No    No   Are you getting kidney dialysis (either peritoneal or hemodialysis)? No    No   Do you have an allergy to latex? No    No       Multiple values from one day are sorted in reverse-chronological order         Patient MEETS CRITERIA. PROCEED with vaccine administration.        Patient instructed to remain in clinic for 15 minutes afterwards, and to report any adverse reactions.      Link to Ancillary Visit Immunization Standing Orders SmartSet     Screening performed by Annemarie Nguyen MA on 6/30/2025 at 1:31 PM.

## 2025-07-31 ENCOUNTER — ALLIED HEALTH/NURSE VISIT (OUTPATIENT)
Dept: FAMILY MEDICINE | Facility: CLINIC | Age: 60
End: 2025-07-31
Payer: COMMERCIAL

## 2025-07-31 DIAGNOSIS — Z23 ENCOUNTER FOR IMMUNIZATION: Primary | ICD-10-CM

## 2025-07-31 NOTE — PROGRESS NOTES
Prior to immunization administration, verified patients identity using patient s name and date of birth. Please see Immunization Activity for additional information.     Is the patient's temperature normal (100.4 or less)? Yes     Patient MEETS CRITERIA. PROCEED with vaccine administration.        7/31/2025   General Questionnaire    Do you have any questions for your care team about the vaccines you will be receiving today? no             7/31/2025   Hepatitis B   Have you had a serious reaction to a hepatitis B vaccine or to something in a hepatitis B vaccine, including yeast)? No   Are you getting kidney dialysis (either peritoneal or hemodialysis)? No   Do you have an allergy to latex? No         Patient MEETS CRITERIA. PROCEED with vaccine administration.        Patient instructed to remain in clinic for 15 minutes afterwards, and to report any adverse reactions.      Link to Ancillary Visit Immunization Standing Orders SmartSet     Screening performed by Yamilet Magallanes on 7/31/2025 at 9:38 AM.

## (undated) RX ORDER — PROPOFOL 10 MG/ML
INJECTION, EMULSION INTRAVENOUS
Status: DISPENSED
Start: 2019-02-08

## (undated) RX ORDER — LIDOCAINE HYDROCHLORIDE 10 MG/ML
INJECTION, SOLUTION EPIDURAL; INFILTRATION; INTRACAUDAL; PERINEURAL
Status: DISPENSED
Start: 2019-02-08